# Patient Record
Sex: FEMALE | Race: OTHER | Employment: UNEMPLOYED | URBAN - METROPOLITAN AREA
[De-identification: names, ages, dates, MRNs, and addresses within clinical notes are randomized per-mention and may not be internally consistent; named-entity substitution may affect disease eponyms.]

---

## 2017-12-13 ENCOUNTER — HOSPITAL ENCOUNTER (EMERGENCY)
Age: 67
Discharge: HOME | End: 2017-12-13
Payer: MEDICAID

## 2017-12-13 ENCOUNTER — APPOINTMENT (OUTPATIENT)
Dept: GENERAL RADIOLOGY | Age: 67
End: 2017-12-13
Payer: MEDICAID

## 2017-12-13 VITALS
HEIGHT: 62 IN | TEMPERATURE: 97.1 F | SYSTOLIC BLOOD PRESSURE: 171 MMHG | RESPIRATION RATE: 18 BRPM | WEIGHT: 177.13 LBS | HEART RATE: 69 BPM | OXYGEN SATURATION: 97 % | DIASTOLIC BLOOD PRESSURE: 91 MMHG | BODY MASS INDEX: 32.59 KG/M2

## 2017-12-13 DIAGNOSIS — S20.211A RIB CONTUSION, RIGHT, INITIAL ENCOUNTER: Primary | ICD-10-CM

## 2017-12-13 DIAGNOSIS — S80.01XA CONTUSION OF RIGHT KNEE, INITIAL ENCOUNTER: ICD-10-CM

## 2017-12-13 PROCEDURE — 73564 X-RAY EXAM KNEE 4 OR MORE: CPT

## 2017-12-13 PROCEDURE — 99283 EMERGENCY DEPT VISIT LOW MDM: CPT

## 2017-12-13 PROCEDURE — 6370000000 HC RX 637 (ALT 250 FOR IP): Performed by: PHYSICIAN ASSISTANT

## 2017-12-13 PROCEDURE — 71101 X-RAY EXAM UNILAT RIBS/CHEST: CPT

## 2017-12-13 RX ORDER — HYDROCODONE BITARTRATE AND ACETAMINOPHEN 5; 325 MG/1; MG/1
1 TABLET ORAL ONCE
Status: COMPLETED | OUTPATIENT
Start: 2017-12-13 | End: 2017-12-13

## 2017-12-13 RX ORDER — ASPIRIN 81 MG/1
81 TABLET, CHEWABLE ORAL DAILY
COMMUNITY

## 2017-12-13 RX ORDER — HYDROCODONE BITARTRATE AND ACETAMINOPHEN 7.5; 325 MG/1; MG/1
1 TABLET ORAL EVERY 8 HOURS PRN
Qty: 12 TABLET | Refills: 0 | Status: SHIPPED | OUTPATIENT
Start: 2017-12-13

## 2017-12-13 RX ADMIN — HYDROCODONE BITARTRATE AND ACETAMINOPHEN 1 TABLET: 5; 325 TABLET ORAL at 19:15

## 2017-12-13 ASSESSMENT — ENCOUNTER SYMPTOMS
RESPIRATORY NEGATIVE: 1
GASTROINTESTINAL NEGATIVE: 1
EYES NEGATIVE: 1

## 2017-12-13 ASSESSMENT — PAIN SCALES - GENERAL
PAINLEVEL_OUTOF10: 9
PAINLEVEL_OUTOF10: 8

## 2017-12-13 ASSESSMENT — PAIN DESCRIPTION - ORIENTATION: ORIENTATION: RIGHT

## 2017-12-13 ASSESSMENT — PAIN DESCRIPTION - LOCATION: LOCATION: RIB CAGE;ABDOMEN;LEG

## 2017-12-13 NOTE — ED TRIAGE NOTES
A& ox4 female, skin pk/w/d with bruising right thigh & right rib cage area, ls clear, delacruz x4 = & spont, gait steady

## 2017-12-13 NOTE — ED PROVIDER NOTES
times daily. FLUTICASONE (FLONASE) 50 MCG/ACT NASAL SPRAY    2 sprays by Nasal route every 12 hours for 15 days    GLIMEPIRIDE (AMARYL) 2 MG TABLET    Take 2 mg by mouth every morning (before breakfast)     IBUPROFEN (ADVIL;MOTRIN) 800 MG TABLET    Take 800 mg by mouth every 8 hours as needed for Pain     ISOSORBIDE MONONITRATE (IMDUR) 30 MG CR TABLET    Take 15 mg by mouth daily     LISINOPRIL (PRINIVIL;ZESTRIL) 20 MG TABLET    Take 20 mg by mouth daily. LOVASTATIN (MEVACOR) 40 MG TABLET    Take 40 mg by mouth nightly. METFORMIN (GLUCOPHAGE) 500 MG TABLET    Take 500 mg by mouth 2 times daily (with meals). METOPROLOL (LOPRESSOR) 25 MG TABLET    Take 50 mg by mouth 2 times daily     OMEGA-3 FATTY ACIDS (FISH OIL) 1000 MG CAPS    Take 1,000 mg by mouth daily. OMEPRAZOLE (PRILOSEC) 20 MG CAPSULE    Take 20 mg by mouth Daily        ALLERGIES     Amoxicillin    FAMILY HISTORY       Family History   Problem Relation Age of Onset    Heart Disease Mother     Heart Disease Father     Heart Disease Sister     Heart Disease Brother           SOCIAL HISTORY       Social History     Social History    Marital status: Legally      Spouse name: N/A    Number of children: N/A    Years of education: N/A     Social History Main Topics    Smoking status: Never Smoker    Smokeless tobacco: Never Used    Alcohol use No    Drug use: No    Sexual activity: Not Asked     Other Topics Concern    None     Social History Narrative    None       SCREENINGS             PHYSICAL EXAM    (up to 7 for level 4, 8 or more for level 5)     ED Triage Vitals [12/13/17 1742]   BP Temp Temp Source Pulse Resp SpO2 Height Weight   (!) 171/91 97.1 °F (36.2 °C) Oral 69 18 97 % 5' 2\" (1.575 m) 177 lb 2 oz (80.3 kg)       Physical Exam   Constitutional: She is oriented to person, place, and time. She appears well-developed and well-nourished. No distress. HENT:   Head: Normocephalic and atraumatic.    Eyes: Stefano Dooley PA-C  12/13/17 2009

## 2017-12-14 NOTE — ED NOTES
A&o, skin warm, dry, respirations are unlabored, calm and cooperative, gait steady.      Ethan Stoddard RN  12/13/17 1919

## 2018-04-28 ENCOUNTER — APPOINTMENT (OUTPATIENT)
Dept: GENERAL RADIOLOGY | Age: 68
End: 2018-04-28
Payer: MEDICARE

## 2018-04-28 ENCOUNTER — APPOINTMENT (OUTPATIENT)
Dept: ULTRASOUND IMAGING | Age: 68
End: 2018-04-28
Payer: MEDICARE

## 2018-04-28 ENCOUNTER — HOSPITAL ENCOUNTER (EMERGENCY)
Age: 68
Discharge: HOME | End: 2018-04-28
Attending: STUDENT IN AN ORGANIZED HEALTH CARE EDUCATION/TRAINING PROGRAM
Payer: MEDICARE

## 2018-04-28 VITALS
SYSTOLIC BLOOD PRESSURE: 129 MMHG | HEART RATE: 71 BPM | WEIGHT: 181 LBS | HEIGHT: 62 IN | TEMPERATURE: 98 F | DIASTOLIC BLOOD PRESSURE: 92 MMHG | BODY MASS INDEX: 33.31 KG/M2 | OXYGEN SATURATION: 96 % | RESPIRATION RATE: 16 BRPM

## 2018-04-28 DIAGNOSIS — M79.651 ACUTE PAIN OF RIGHT THIGH: Primary | ICD-10-CM

## 2018-04-28 DIAGNOSIS — S86.911A MUSCLE STRAIN OF RIGHT LOWER EXTREMITY, INITIAL ENCOUNTER: ICD-10-CM

## 2018-04-28 DIAGNOSIS — E86.0 DEHYDRATION: ICD-10-CM

## 2018-04-28 LAB
ALBUMIN SERPL-MCNC: 4 G/DL (ref 3.9–4.9)
ALP BLD-CCNC: 59 U/L (ref 40–130)
ALT SERPL-CCNC: 14 U/L (ref 0–33)
ANION GAP SERPL CALCULATED.3IONS-SCNC: 11 MEQ/L (ref 7–13)
APTT: 24.2 SEC (ref 21.6–35.4)
AST SERPL-CCNC: 15 U/L (ref 0–35)
BASOPHILS ABSOLUTE: 0.1 K/UL (ref 0–0.2)
BASOPHILS RELATIVE PERCENT: 0.9 %
BILIRUB SERPL-MCNC: 0.3 MG/DL (ref 0–1.2)
BUN BLDV-MCNC: 18 MG/DL (ref 8–23)
CALCIUM SERPL-MCNC: 9.4 MG/DL (ref 8.6–10.2)
CHLORIDE BLD-SCNC: 102 MEQ/L (ref 98–107)
CO2: 27 MEQ/L (ref 22–29)
CREAT SERPL-MCNC: 0.43 MG/DL (ref 0.5–0.9)
EOSINOPHILS ABSOLUTE: 0.2 K/UL (ref 0–0.7)
EOSINOPHILS RELATIVE PERCENT: 2.6 %
GFR AFRICAN AMERICAN: >60
GFR NON-AFRICAN AMERICAN: >60
GLOBULIN: 2.5 G/DL (ref 2.3–3.5)
GLUCOSE BLD-MCNC: 104 MG/DL (ref 74–109)
HCT VFR BLD CALC: 40.5 % (ref 37–47)
HEMOGLOBIN: 13.7 G/DL (ref 12–16)
INR BLD: 1
LACTIC ACID: 2.3 MMOL/L (ref 0.5–2.2)
LYMPHOCYTES ABSOLUTE: 1.8 K/UL (ref 1–4.8)
LYMPHOCYTES RELATIVE PERCENT: 27.4 %
MCH RBC QN AUTO: 30.4 PG (ref 27–31.3)
MCHC RBC AUTO-ENTMCNC: 33.9 % (ref 33–37)
MCV RBC AUTO: 89.7 FL (ref 82–100)
MONOCYTES ABSOLUTE: 0.4 K/UL (ref 0.2–0.8)
MONOCYTES RELATIVE PERCENT: 6.5 %
NEUTROPHILS ABSOLUTE: 4.1 K/UL (ref 1.4–6.5)
NEUTROPHILS RELATIVE PERCENT: 62.6 %
PDW BLD-RTO: 14.6 % (ref 11.5–14.5)
PLATELET # BLD: 188 K/UL (ref 130–400)
POTASSIUM SERPL-SCNC: 4.9 MEQ/L (ref 3.5–5.1)
PROTHROMBIN TIME: 10.2 SEC (ref 9.6–12.3)
RBC # BLD: 4.51 M/UL (ref 4.2–5.4)
SODIUM BLD-SCNC: 140 MEQ/L (ref 132–144)
TOTAL CK: 72 U/L (ref 0–170)
TOTAL PROTEIN: 6.5 G/DL (ref 6.4–8.1)
URIC ACID, SERUM: 3.7 MG/DL (ref 2.4–5.7)
WBC # BLD: 6.6 K/UL (ref 4.8–10.8)

## 2018-04-28 PROCEDURE — 6360000002 HC RX W HCPCS: Performed by: STUDENT IN AN ORGANIZED HEALTH CARE EDUCATION/TRAINING PROGRAM

## 2018-04-28 PROCEDURE — 73552 X-RAY EXAM OF FEMUR 2/>: CPT

## 2018-04-28 PROCEDURE — 85730 THROMBOPLASTIN TIME PARTIAL: CPT

## 2018-04-28 PROCEDURE — 82550 ASSAY OF CK (CPK): CPT

## 2018-04-28 PROCEDURE — 84550 ASSAY OF BLOOD/URIC ACID: CPT

## 2018-04-28 PROCEDURE — 85025 COMPLETE CBC W/AUTO DIFF WBC: CPT

## 2018-04-28 PROCEDURE — 80053 COMPREHEN METABOLIC PANEL: CPT

## 2018-04-28 PROCEDURE — 93971 EXTREMITY STUDY: CPT

## 2018-04-28 PROCEDURE — 85610 PROTHROMBIN TIME: CPT

## 2018-04-28 PROCEDURE — 36415 COLL VENOUS BLD VENIPUNCTURE: CPT

## 2018-04-28 PROCEDURE — 99284 EMERGENCY DEPT VISIT MOD MDM: CPT

## 2018-04-28 PROCEDURE — 96374 THER/PROPH/DIAG INJ IV PUSH: CPT

## 2018-04-28 PROCEDURE — 83605 ASSAY OF LACTIC ACID: CPT

## 2018-04-28 RX ORDER — KETOROLAC TROMETHAMINE 30 MG/ML
30 INJECTION, SOLUTION INTRAMUSCULAR; INTRAVENOUS ONCE
Status: COMPLETED | OUTPATIENT
Start: 2018-04-28 | End: 2018-04-28

## 2018-04-28 RX ORDER — ASPIRIN 81 MG
1 TABLET,CHEWABLE ORAL 3 TIMES DAILY PRN
Qty: 56.6 G | Refills: 0 | Status: ON HOLD | OUTPATIENT
Start: 2018-04-28 | End: 2021-06-03

## 2018-04-28 RX ORDER — CYCLOBENZAPRINE HCL 10 MG
10 TABLET ORAL 3 TIMES DAILY PRN
Qty: 12 TABLET | Refills: 0 | Status: ON HOLD | OUTPATIENT
Start: 2018-04-28 | End: 2021-06-03

## 2018-04-28 RX ORDER — NAPROXEN 500 MG/1
500 TABLET ORAL 2 TIMES DAILY
Qty: 20 TABLET | Refills: 0 | Status: ON HOLD | OUTPATIENT
Start: 2018-04-28 | End: 2020-08-14 | Stop reason: HOSPADM

## 2018-04-28 RX ADMIN — KETOROLAC TROMETHAMINE 30 MG: 30 INJECTION, SOLUTION INTRAMUSCULAR at 13:34

## 2018-04-28 ASSESSMENT — ENCOUNTER SYMPTOMS
SINUS PRESSURE: 0
ABDOMINAL PAIN: 0
SHORTNESS OF BREATH: 0
TROUBLE SWALLOWING: 0
BACK PAIN: 0
VOMITING: 0
DIARRHEA: 0
COUGH: 0
CHEST TIGHTNESS: 0

## 2018-04-28 ASSESSMENT — PAIN DESCRIPTION - LOCATION: LOCATION: LEG

## 2018-04-28 ASSESSMENT — PAIN DESCRIPTION - FREQUENCY: FREQUENCY: INTERMITTENT

## 2018-04-28 ASSESSMENT — PAIN SCALES - GENERAL
PAINLEVEL_OUTOF10: 8
PAINLEVEL_OUTOF10: 8

## 2018-04-28 ASSESSMENT — PAIN DESCRIPTION - PAIN TYPE: TYPE: CHRONIC PAIN

## 2018-04-28 ASSESSMENT — PAIN DESCRIPTION - ORIENTATION: ORIENTATION: RIGHT;UPPER

## 2018-09-22 ENCOUNTER — HOSPITAL ENCOUNTER (EMERGENCY)
Age: 68
Discharge: HOME | End: 2018-09-22
Payer: MEDICARE

## 2018-09-22 ENCOUNTER — APPOINTMENT (OUTPATIENT)
Dept: GENERAL RADIOLOGY | Age: 68
End: 2018-09-22
Payer: MEDICARE

## 2018-09-22 VITALS
HEIGHT: 62 IN | HEART RATE: 70 BPM | OXYGEN SATURATION: 100 % | SYSTOLIC BLOOD PRESSURE: 142 MMHG | WEIGHT: 195 LBS | TEMPERATURE: 97.9 F | BODY MASS INDEX: 35.88 KG/M2 | RESPIRATION RATE: 20 BRPM | DIASTOLIC BLOOD PRESSURE: 68 MMHG

## 2018-09-22 DIAGNOSIS — J06.9 VIRAL URI WITH COUGH: Primary | ICD-10-CM

## 2018-09-22 LAB
RAPID INFLUENZA  B AGN: NEGATIVE
RAPID INFLUENZA A AGN: NEGATIVE

## 2018-09-22 PROCEDURE — 87804 INFLUENZA ASSAY W/OPTIC: CPT

## 2018-09-22 PROCEDURE — 71046 X-RAY EXAM CHEST 2 VIEWS: CPT

## 2018-09-22 PROCEDURE — 99283 EMERGENCY DEPT VISIT LOW MDM: CPT

## 2018-09-22 RX ORDER — ALBUTEROL SULFATE 90 UG/1
2 AEROSOL, METERED RESPIRATORY (INHALATION) EVERY 6 HOURS PRN
Qty: 1 INHALER | Refills: 0 | Status: ON HOLD | OUTPATIENT
Start: 2018-09-22 | End: 2021-06-03

## 2018-09-22 RX ORDER — BENZONATATE 100 MG/1
100 CAPSULE ORAL 3 TIMES DAILY PRN
Qty: 21 CAPSULE | Refills: 0 | Status: SHIPPED | OUTPATIENT
Start: 2018-09-22 | End: 2018-09-29

## 2018-09-22 RX ORDER — AZELASTINE 1 MG/ML
1 SPRAY, METERED NASAL 2 TIMES DAILY
Qty: 1 BOTTLE | Refills: 3 | Status: ON HOLD | OUTPATIENT
Start: 2018-09-22 | End: 2021-06-03

## 2018-09-22 RX ORDER — LORATADINE 10 MG/1
10 TABLET ORAL DAILY
Qty: 30 TABLET | Refills: 0 | Status: SHIPPED | OUTPATIENT
Start: 2018-09-22 | End: 2018-10-22

## 2018-09-22 ASSESSMENT — ENCOUNTER SYMPTOMS
SHORTNESS OF BREATH: 1
CHEST TIGHTNESS: 0
SORE THROAT: 1
SINUS PRESSURE: 1
DIARRHEA: 0
TROUBLE SWALLOWING: 0
ABDOMINAL DISTENTION: 0
RHINORRHEA: 1
BACK PAIN: 0
SINUS PAIN: 0
WHEEZING: 0
VOMITING: 0
NAUSEA: 0
COLOR CHANGE: 0
CONSTIPATION: 0
ABDOMINAL PAIN: 0
COUGH: 1

## 2018-09-22 ASSESSMENT — PAIN SCALES - GENERAL: PAINLEVEL_OUTOF10: 9

## 2018-09-22 ASSESSMENT — PAIN DESCRIPTION - DESCRIPTORS: DESCRIPTORS: ACHING

## 2018-09-22 ASSESSMENT — PAIN DESCRIPTION - FREQUENCY: FREQUENCY: INTERMITTENT

## 2018-09-22 ASSESSMENT — PAIN DESCRIPTION - PROGRESSION: CLINICAL_PROGRESSION: GRADUALLY WORSENING

## 2018-09-22 ASSESSMENT — PAIN DESCRIPTION - LOCATION: LOCATION: GENERALIZED

## 2018-09-22 ASSESSMENT — PAIN DESCRIPTION - ONSET: ONSET: ON-GOING

## 2018-09-22 ASSESSMENT — PAIN DESCRIPTION - PAIN TYPE: TYPE: ACUTE PAIN

## 2018-09-22 NOTE — ED PROVIDER NOTES
[facility] ED  eMERGENCY dEPARTMENT eNCOUnter      Pt Name: Minda Chin  MRN: [id number]  [place] 1950  Date of evaluation: 9/22/2018  Provider: Len Umanzor       Chief Complaint   Patient presents with    Cough     productive cough. unknown color due to patient swallowing mucous. HISTORY OF PRESENT ILLNESS   (Location/Symptom, Timing/Onset, Context/Setting, Quality, Duration, Modifying Factors, Severity)  Note limiting factors. Minda Chin is a 79 y.o. female who presents to the emergency department For complaint of cough congestion body aches fever sensation of chills and sweats ×2 days. Patient's daughter reports the patient's had a productive cough increased and worse the past 24 hours. Body aches started today that she really did not 10 generalized aching but no modifying factors. Patient reports sore throat itching and irritation and runny nose with cough and congestion. Denies any abdominal pain nausea vomiting or diarrhea. Daughter reports the patient has been taking over-the-counter Lacey-Wilson Tylenol and cold and cough medications. She reports only minor relief from this. Nursing Notes were reviewed. REVIEW OF SYSTEMS    (2-9 systems for level 4, 10 or more for level 5)     Review of Systems   Constitutional: Positive for chills, diaphoresis and fatigue. Negative for activity change, appetite change and fever. HENT: Positive for postnasal drip, rhinorrhea, sinus pressure and sore throat. Negative for congestion, ear pain, sinus pain and trouble swallowing. Respiratory: Positive for cough and shortness of breath (with coughing only). Negative for chest tightness and wheezing. Cardiovascular: Negative for chest pain and palpitations. Gastrointestinal: Negative for abdominal distention, abdominal pain, constipation, diarrhea, nausea and vomiting.    Genitourinary: Negative for difficulty urinating, patient is afebrile and nontoxic no acute distress. Respirations are even unlabored with equal bilateral clear lung sounds. Due to patient's presentation and rapid development of symptoms flu swab obtained for further evaluation. Influenza swab and chest x-ray are negative. Patient is stable to discharge home with medications for symptom management. Patient's daughter states she has bad reactions to steroids, patient's lung sounds are clear with no diminished lung sounds or wheezing at this time steroids do not appear to be assess area. Encouraged to follow up primary care providers and his possible further evaluation. Directed The daughter the patient to return to the ER immediately for any worsening of symptoms chest tightness chest pain severe shortness of breath or symptoms of other concern. Patient and family verbalized understanding of all given instructions and education. Standard anticipatory guidance given to patient upon discharge. Have given them a specific time frame in which to follow-up and who to follow-up with. I have also advised them that they should return to the emergency department if they get worse, or not getting better or develop any new or concerning symptoms. Patient demonstrates understanding and all questions were answered. CRITICAL CARE TIME       CONSULTS:  None    PROCEDURES:  Unless otherwise noted below, none     Procedures    FINAL IMPRESSION      1.  Viral URI with cough          DISPOSITION/PLAN   DISPOSITION Decision To Discharge 09/22/2018 07:06:54 PM      PATIENT REFERRED TO:  Sunitha Lopez MD  [address] [facility] [address]  [phone]    Call in 1 day        DISCHARGE MEDICATIONS:  New Prescriptions    ALBUTEROL SULFATE  (90 BASE) MCG/ACT INHALER    Inhale 2 puffs into the lungs every 6 hours as needed for Wheezing    AZELASTINE (ASTELIN) 0.1 % NASAL SPRAY    1 spray by Nasal route 2 times daily Use in each nostril as directed

## 2019-02-03 ENCOUNTER — HOSPITAL ENCOUNTER (EMERGENCY)
Age: 69
Discharge: HOME | End: 2019-02-03
Attending: EMERGENCY MEDICINE
Payer: MEDICARE

## 2019-02-03 ENCOUNTER — APPOINTMENT (OUTPATIENT)
Dept: ULTRASOUND IMAGING | Age: 69
End: 2019-02-03
Payer: MEDICARE

## 2019-02-03 ENCOUNTER — APPOINTMENT (OUTPATIENT)
Dept: GENERAL RADIOLOGY | Age: 69
End: 2019-02-03
Payer: MEDICARE

## 2019-02-03 VITALS
SYSTOLIC BLOOD PRESSURE: 166 MMHG | DIASTOLIC BLOOD PRESSURE: 90 MMHG | OXYGEN SATURATION: 98 % | TEMPERATURE: 98.1 F | HEART RATE: 74 BPM | RESPIRATION RATE: 18 BRPM

## 2019-02-03 DIAGNOSIS — R60.0 LOWER EXTREMITY EDEMA: Primary | ICD-10-CM

## 2019-02-03 LAB
ANION GAP SERPL CALCULATED.3IONS-SCNC: 15 MEQ/L (ref 7–13)
BASOPHILS ABSOLUTE: 0.1 K/UL (ref 0–0.2)
BASOPHILS RELATIVE PERCENT: 0.9 %
BUN BLDV-MCNC: 15 MG/DL (ref 8–23)
CALCIUM SERPL-MCNC: 9.4 MG/DL (ref 8.6–10.2)
CHLORIDE BLD-SCNC: 102 MEQ/L (ref 98–107)
CO2: 27 MEQ/L (ref 22–29)
CREAT SERPL-MCNC: 0.54 MG/DL (ref 0.5–0.9)
EKG ATRIAL RATE: 81 BPM
EKG P AXIS: 53 DEGREES
EKG P-R INTERVAL: 142 MS
EKG Q-T INTERVAL: 380 MS
EKG QRS DURATION: 88 MS
EKG QTC CALCULATION (BAZETT): 441 MS
EKG R AXIS: 8 DEGREES
EKG T AXIS: 11 DEGREES
EKG VENTRICULAR RATE: 81 BPM
EOSINOPHILS ABSOLUTE: 0.2 K/UL (ref 0–0.7)
EOSINOPHILS RELATIVE PERCENT: 4 %
GFR AFRICAN AMERICAN: >60
GFR NON-AFRICAN AMERICAN: >60
GLUCOSE BLD-MCNC: 91 MG/DL (ref 74–109)
HCT VFR BLD CALC: 41.8 % (ref 37–47)
HEMOGLOBIN: 13.7 G/DL (ref 12–16)
LYMPHOCYTES ABSOLUTE: 2 K/UL (ref 1–4.8)
LYMPHOCYTES RELATIVE PERCENT: 32.2 %
MCH RBC QN AUTO: 29.6 PG (ref 27–31.3)
MCHC RBC AUTO-ENTMCNC: 32.8 % (ref 33–37)
MCV RBC AUTO: 90.1 FL (ref 82–100)
MONOCYTES ABSOLUTE: 0.5 K/UL (ref 0.2–0.8)
MONOCYTES RELATIVE PERCENT: 7.4 %
NEUTROPHILS ABSOLUTE: 3.4 K/UL (ref 1.4–6.5)
NEUTROPHILS RELATIVE PERCENT: 55.5 %
PDW BLD-RTO: 14 % (ref 11.5–14.5)
PLATELET # BLD: 197 K/UL (ref 130–400)
POTASSIUM SERPL-SCNC: 4.1 MEQ/L (ref 3.5–5.1)
PRO-BNP: 210 PG/ML
RBC # BLD: 4.64 M/UL (ref 4.2–5.4)
SODIUM BLD-SCNC: 144 MEQ/L (ref 132–144)
WBC # BLD: 6.1 K/UL (ref 4.8–10.8)

## 2019-02-03 PROCEDURE — 6370000000 HC RX 637 (ALT 250 FOR IP): Performed by: EMERGENCY MEDICINE

## 2019-02-03 PROCEDURE — 6360000002 HC RX W HCPCS: Performed by: EMERGENCY MEDICINE

## 2019-02-03 PROCEDURE — 99284 EMERGENCY DEPT VISIT MOD MDM: CPT

## 2019-02-03 PROCEDURE — 96374 THER/PROPH/DIAG INJ IV PUSH: CPT

## 2019-02-03 PROCEDURE — 80048 BASIC METABOLIC PNL TOTAL CA: CPT

## 2019-02-03 PROCEDURE — 93005 ELECTROCARDIOGRAM TRACING: CPT

## 2019-02-03 PROCEDURE — 83880 ASSAY OF NATRIURETIC PEPTIDE: CPT

## 2019-02-03 PROCEDURE — 71045 X-RAY EXAM CHEST 1 VIEW: CPT

## 2019-02-03 PROCEDURE — 93970 EXTREMITY STUDY: CPT

## 2019-02-03 PROCEDURE — 85025 COMPLETE CBC W/AUTO DIFF WBC: CPT

## 2019-02-03 RX ORDER — ACETAMINOPHEN 500 MG
1000 TABLET ORAL ONCE
Status: COMPLETED | OUTPATIENT
Start: 2019-02-03 | End: 2019-02-03

## 2019-02-03 RX ORDER — SODIUM CHLORIDE 0.9 % (FLUSH) 0.9 %
3 SYRINGE (ML) INJECTION EVERY 8 HOURS
Status: DISCONTINUED | OUTPATIENT
Start: 2019-02-03 | End: 2019-02-03 | Stop reason: HOSPADM

## 2019-02-03 RX ORDER — FUROSEMIDE 20 MG/1
20 TABLET ORAL 2 TIMES DAILY
Qty: 4 TABLET | Refills: 0 | Status: ON HOLD | OUTPATIENT
Start: 2019-02-03 | End: 2021-06-03

## 2019-02-03 RX ORDER — FUROSEMIDE 10 MG/ML
20 INJECTION INTRAMUSCULAR; INTRAVENOUS ONCE
Status: COMPLETED | OUTPATIENT
Start: 2019-02-03 | End: 2019-02-03

## 2019-02-03 RX ADMIN — FUROSEMIDE 20 MG: 10 INJECTION, SOLUTION INTRAVENOUS at 20:05

## 2019-02-03 RX ADMIN — ACETAMINOPHEN 1000 MG: 500 TABLET ORAL at 20:04

## 2019-02-03 ASSESSMENT — PAIN SCALES - GENERAL: PAINLEVEL_OUTOF10: 10

## 2019-02-03 ASSESSMENT — PAIN DESCRIPTION - ONSET: ONSET: SUDDEN

## 2019-02-03 ASSESSMENT — PAIN DESCRIPTION - FREQUENCY: FREQUENCY: CONTINUOUS

## 2019-02-03 ASSESSMENT — PAIN DESCRIPTION - ORIENTATION: ORIENTATION: LOWER

## 2019-02-03 ASSESSMENT — PAIN DESCRIPTION - LOCATION: LOCATION: LEG

## 2019-02-03 ASSESSMENT — PAIN DESCRIPTION - DESCRIPTORS: DESCRIPTORS: PRESSURE

## 2019-02-03 ASSESSMENT — PAIN DESCRIPTION - PAIN TYPE: TYPE: ACUTE PAIN

## 2019-02-04 PROCEDURE — 93010 ELECTROCARDIOGRAM REPORT: CPT | Performed by: INTERNAL MEDICINE

## 2019-04-20 ENCOUNTER — HOSPITAL ENCOUNTER (EMERGENCY)
Age: 69
Discharge: HOME | End: 2019-04-20
Attending: EMERGENCY MEDICINE
Payer: MEDICARE

## 2019-04-20 VITALS
TEMPERATURE: 97.9 F | WEIGHT: 185 LBS | RESPIRATION RATE: 16 BRPM | BODY MASS INDEX: 34.04 KG/M2 | OXYGEN SATURATION: 99 % | DIASTOLIC BLOOD PRESSURE: 75 MMHG | HEIGHT: 62 IN | HEART RATE: 92 BPM | SYSTOLIC BLOOD PRESSURE: 153 MMHG

## 2019-04-20 DIAGNOSIS — I10 ESSENTIAL HYPERTENSION: ICD-10-CM

## 2019-04-20 DIAGNOSIS — H81.03 MENIERE'S DISEASE OF BOTH EARS: Primary | ICD-10-CM

## 2019-04-20 LAB
ALBUMIN SERPL-MCNC: 4.1 G/DL (ref 3.5–4.6)
ALP BLD-CCNC: 56 U/L (ref 40–130)
ALT SERPL-CCNC: 17 U/L (ref 0–33)
ANION GAP SERPL CALCULATED.3IONS-SCNC: 17 MEQ/L (ref 9–15)
AST SERPL-CCNC: 17 U/L (ref 0–35)
BASOPHILS ABSOLUTE: 0.1 K/UL (ref 0–0.2)
BASOPHILS RELATIVE PERCENT: 0.8 %
BILIRUB SERPL-MCNC: 0.3 MG/DL (ref 0.2–0.7)
BILIRUBIN URINE: NEGATIVE
BLOOD, URINE: NEGATIVE
BUN BLDV-MCNC: 11 MG/DL (ref 8–23)
CALCIUM SERPL-MCNC: 9 MG/DL (ref 8.5–9.9)
CHLORIDE BLD-SCNC: 98 MEQ/L (ref 95–107)
CLARITY: CLEAR
CO2: 23 MEQ/L (ref 20–31)
COLOR: YELLOW
CREAT SERPL-MCNC: 0.41 MG/DL (ref 0.5–0.9)
EKG ATRIAL RATE: 96 BPM
EKG P AXIS: 66 DEGREES
EKG P-R INTERVAL: 152 MS
EKG Q-T INTERVAL: 354 MS
EKG QRS DURATION: 84 MS
EKG QTC CALCULATION (BAZETT): 447 MS
EKG R AXIS: 20 DEGREES
EKG T AXIS: 18 DEGREES
EKG VENTRICULAR RATE: 96 BPM
EOSINOPHILS ABSOLUTE: 0.2 K/UL (ref 0–0.7)
EOSINOPHILS RELATIVE PERCENT: 2.3 %
GFR AFRICAN AMERICAN: >60
GFR NON-AFRICAN AMERICAN: >60
GLOBULIN: 2.7 G/DL (ref 2.3–3.5)
GLUCOSE BLD-MCNC: 155 MG/DL (ref 70–99)
GLUCOSE URINE: NEGATIVE MG/DL
HCT VFR BLD CALC: 39.2 % (ref 37–47)
HEMOGLOBIN: 13.2 G/DL (ref 12–16)
KETONES, URINE: NEGATIVE MG/DL
LEUKOCYTE ESTERASE, URINE: NEGATIVE
LYMPHOCYTES ABSOLUTE: 1.9 K/UL (ref 1–4.8)
LYMPHOCYTES RELATIVE PERCENT: 23.5 %
MCH RBC QN AUTO: 28.6 PG (ref 27–31.3)
MCHC RBC AUTO-ENTMCNC: 33.6 % (ref 33–37)
MCV RBC AUTO: 85.1 FL (ref 82–100)
MONOCYTES ABSOLUTE: 0.5 K/UL (ref 0.2–0.8)
MONOCYTES RELATIVE PERCENT: 5.7 %
NEUTROPHILS ABSOLUTE: 5.6 K/UL (ref 1.4–6.5)
NEUTROPHILS RELATIVE PERCENT: 67.7 %
NITRITE, URINE: NEGATIVE
PDW BLD-RTO: 13.9 % (ref 11.5–14.5)
PH UA: 7.5 (ref 5–9)
PLATELET # BLD: 163 K/UL (ref 130–400)
POTASSIUM SERPL-SCNC: 3.6 MEQ/L (ref 3.4–4.9)
PROTEIN UA: NEGATIVE MG/DL
RBC # BLD: 4.61 M/UL (ref 4.2–5.4)
SODIUM BLD-SCNC: 138 MEQ/L (ref 135–144)
SPECIFIC GRAVITY UA: 1.01 (ref 1–1.03)
TOTAL PROTEIN: 6.8 G/DL (ref 6.3–8)
URINE REFLEX TO CULTURE: NORMAL
UROBILINOGEN, URINE: 0.2 E.U./DL
WBC # BLD: 8.2 K/UL (ref 4.8–10.8)

## 2019-04-20 PROCEDURE — 6370000000 HC RX 637 (ALT 250 FOR IP): Performed by: EMERGENCY MEDICINE

## 2019-04-20 PROCEDURE — 80053 COMPREHEN METABOLIC PANEL: CPT

## 2019-04-20 PROCEDURE — 99284 EMERGENCY DEPT VISIT MOD MDM: CPT

## 2019-04-20 PROCEDURE — 81003 URINALYSIS AUTO W/O SCOPE: CPT

## 2019-04-20 PROCEDURE — 85025 COMPLETE CBC W/AUTO DIFF WBC: CPT

## 2019-04-20 PROCEDURE — 93005 ELECTROCARDIOGRAM TRACING: CPT

## 2019-04-20 PROCEDURE — 36415 COLL VENOUS BLD VENIPUNCTURE: CPT

## 2019-04-20 RX ORDER — CLONIDINE HYDROCHLORIDE 0.1 MG/1
0.2 TABLET ORAL ONCE
Status: COMPLETED | OUTPATIENT
Start: 2019-04-20 | End: 2019-04-20

## 2019-04-20 RX ORDER — MECLIZINE HYDROCHLORIDE 25 MG/1
25 TABLET ORAL ONCE
Status: COMPLETED | OUTPATIENT
Start: 2019-04-20 | End: 2019-04-20

## 2019-04-20 RX ORDER — CLONIDINE HYDROCHLORIDE 0.2 MG/1
0.2 TABLET ORAL 2 TIMES DAILY
Qty: 60 TABLET | Refills: 0 | Status: ON HOLD | OUTPATIENT
Start: 2019-04-20 | End: 2021-06-03

## 2019-04-20 RX ORDER — MECLIZINE HYDROCHLORIDE 25 MG/1
TABLET ORAL
Qty: 20 TABLET | Refills: 0 | Status: SHIPPED | OUTPATIENT
Start: 2019-04-20

## 2019-04-20 RX ADMIN — CLONIDINE HYDROCHLORIDE 0.2 MG: 0.1 TABLET ORAL at 01:26

## 2019-04-20 RX ADMIN — MECLIZINE HYDROCHLORIDE 25 MG: 25 TABLET ORAL at 01:26

## 2019-04-20 ASSESSMENT — ENCOUNTER SYMPTOMS
CONSTIPATION: 0
BLOOD IN STOOL: 0
COLOR CHANGE: 0
SINUS PRESSURE: 0
PHOTOPHOBIA: 0
NAUSEA: 0
CHEST TIGHTNESS: 0
ANAL BLEEDING: 0
VOMITING: 0
EYE ITCHING: 0
BACK PAIN: 0
FACIAL SWELLING: 0
EYE REDNESS: 0
VOICE CHANGE: 0
EYE PAIN: 0
CHOKING: 0
DIARRHEA: 0
SHORTNESS OF BREATH: 0
ABDOMINAL DISTENTION: 0
STRIDOR: 0
EYE DISCHARGE: 0
WHEEZING: 0
TROUBLE SWALLOWING: 0
COUGH: 0
RHINORRHEA: 0
ABDOMINAL PAIN: 0
SORE THROAT: 0

## 2019-04-20 NOTE — ED NOTES
Bed: 10  Expected date:   Expected time:   Means of arrival:   Comments:  69yr female anxiety and htn, 214/90, [facility]  04/20/19 9266

## 2019-04-20 NOTE — ED TRIAGE NOTES
Pt states she woke up to use the bathroom and became dizzy. Pt is visibly anxious and shaking. Pt admits that she does feel anxious and did not take her anxiety meds today.

## 2019-04-20 NOTE — ED PROVIDER NOTES
[address] ED  eMERGENCY dEPARTMENT eNCOUnter      Pt Name: Bhavin Griffith  MRN: [id number]  [place] 1950  Date of evaluation: 4/20/2019  Provider: Néstor Mayfield MD    CHIEF COMPLAINT       Chief Complaint   Patient presents with    Anxiety    Dizziness         HISTORY OF PRESENT ILLNESS   (Location/Symptom, Timing/Onset, Context/Setting, Quality, Duration, Modifying Factors, Severity)  Note limiting factors. Bhavin Griffith is a 76 y.o. female who presents to the emergency department with anxiety and dizziness. The patient states she awoke feeling anxious and very dizzy. She states this occurred approximately an hour ago. She denies any recent trauma. Location symptoms are in ahead timing in onset as above context and setting: This started at home. Quality pain: None. Duration: One and a half hours. Modifying factors: Patient's had no prior treatment for this. Very mild to moderate. Patient denies nausea vomiting diarrhea fever chills or headache. She states she is dizzy however. HPI    Nursing Notes were reviewed. REVIEW OF SYSTEMS    (2-9 systems for level 4, 10 or more for level 5)     Review of Systems   Constitutional: Negative for appetite change, chills, diaphoresis, fatigue and fever. HENT: Negative for congestion, dental problem, ear discharge, ear pain, facial swelling, hearing loss, mouth sores, nosebleeds, postnasal drip, rhinorrhea, sinus pressure, sneezing, sore throat, tinnitus, trouble swallowing and voice change. Eyes: Negative for photophobia, pain, discharge, redness, itching and visual disturbance. Respiratory: Negative for cough, choking, chest tightness, shortness of breath, wheezing and stridor. Cardiovascular: Negative for chest pain, palpitations and leg swelling. Gastrointestinal: Negative for abdominal distention, abdominal pain, anal bleeding, blood in stool, constipation, diarrhea, nausea and vomiting.    Endocrine: Negative for cold intolerance, heat intolerance, polydipsia and polyphagia. Genitourinary: Negative for decreased urine volume, difficulty urinating, dysuria, enuresis, flank pain, frequency, genital sores, hematuria and urgency. Musculoskeletal: Negative for arthralgias, back pain, gait problem, joint swelling, myalgias, neck pain and neck stiffness. Skin: Negative for color change, pallor, rash and wound. Allergic/Immunologic: Negative for environmental allergies and food allergies. Neurological: Positive for dizziness. Negative for tremors, seizures, syncope, facial asymmetry, speech difficulty, weakness, light-headedness, numbness and headaches. Hematological: Negative for adenopathy. Does not bruise/bleed easily. Psychiatric/Behavioral: Negative for agitation, behavioral problems, confusion, decreased concentration, dysphoric mood, hallucinations, self-injury, sleep disturbance and suicidal ideas. The patient is not nervous/anxious and is not hyperactive. Except as noted above the remainder of the review of systems was reviewed and negative. PAST MEDICAL HISTORY     Past Medical History:   Diagnosis Date    CAD (coronary artery disease)     Diabetes mellitus ([facility] [address]     Dyslipidemia     FH: CAD (coronary artery disease)     History of TIA (transient ischemic attack)     HTN (hypertension)     Unstable angina ([facility] [address] 12/13/12         SURGICAL HISTORY       Past Surgical History:   Procedure Laterality Date    CORONARY ANGIOPLASTY  12/14/12    DIAGNOSTIC CARDIAC CATH LAB PROCEDURE  12/14/12    HYSTERECTOMY  03/2018         CURRENT MEDICATIONS       Previous Medications    ALBUTEROL SULFATE  (90 BASE) MCG/ACT INHALER    Inhale 2 puffs into the lungs every 6 hours as needed for Wheezing    AMLODIPINE (NORVASC) 10 MG TABLET    Take 10 mg by mouth daily. ASPIRIN 325 MG EC TABLET    Take 325 mg by mouth daily.       ASPIRIN 81 MG CHEWABLE TABLET    Take 81 mg by mouth daily AZELASTINE (ASTELIN) 0.1 % NASAL SPRAY    1 spray by Nasal route 2 times daily Use in each nostril as directed    CAPSAICIN 0.1 % CREA    Apply 1 applicator topically 3 times daily as needed (right thigh pain)    CYCLOBENZAPRINE (FLEXERIL) 10 MG TABLET    Take 1 tablet by mouth 3 times daily as needed for Muscle spasms    DILTIAZEM (CARDIZEM SR) 90 MG SR CAPSULE    Take 90 mg by mouth 2 times daily. FLUTICASONE (FLONASE) 50 MCG/ACT NASAL SPRAY    2 sprays by Nasal route every 12 hours for 15 days    FUROSEMIDE (LASIX) 20 MG TABLET    Take 1 tablet by mouth 2 times daily    GLIMEPIRIDE (AMARYL) 2 MG TABLET    Take 2 mg by mouth every morning (before breakfast)     HYDROCODONE-ACETAMINOPHEN (NORCO) 7.5-325 MG PER TABLET    Take 1 tablet by mouth every 8 hours as needed for Pain . IBUPROFEN (ADVIL;MOTRIN) 800 MG TABLET    Take 800 mg by mouth every 8 hours as needed for Pain     ISOSORBIDE MONONITRATE (IMDUR) 30 MG CR TABLET    Take 15 mg by mouth daily     LISINOPRIL (PRINIVIL;ZESTRIL) 20 MG TABLET    Take 20 mg by mouth daily. LOVASTATIN (MEVACOR) 40 MG TABLET    Take 40 mg by mouth nightly. METFORMIN (GLUCOPHAGE) 500 MG TABLET    Take 500 mg by mouth 2 times daily (with meals). METOPROLOL (LOPRESSOR) 25 MG TABLET    Take 50 mg by mouth 2 times daily     NAPROXEN (NAPROSYN) 500 MG TABLET    Take 1 tablet by mouth 2 times daily for 20 doses    OMEGA-3 FATTY ACIDS (FISH OIL) 1000 MG CAPS    Take 1,000 mg by mouth daily. OMEPRAZOLE (PRILOSEC) 20 MG CAPSULE    Take 20 mg by mouth Daily        ALLERGIES     Amoxicillin    FAMILY HISTORY       Family History   Problem Relation Age of Onset    Heart Disease Mother     Heart Disease Father     Heart Disease Sister     Heart Disease Brother           SOCIAL HISTORY       Social History     Socioeconomic History    Marital status:       Spouse name: Not on file    Number of children: Not on file    Years of education: Not on file    Highest education level: Not on file   Occupational History    Not on file   Social Needs    Financial resource strain: Not on file    Food insecurity:     Worry: Not on file     Inability: Not on file    Transportation needs:     Medical: Not on file     Non-medical: Not on file   Tobacco Use    Smoking status: Never Smoker    Smokeless tobacco: Never Used   Substance and Sexual Activity    Alcohol use: No     Alcohol/week: 0.0 oz    Drug use: No    Sexual activity: Not on file   Lifestyle    Physical activity:     Days per week: Not on file     Minutes per session: Not on file    Stress: Not on file   Relationships    Social connections:     Talks on phone: Not on file     Gets together: Not on file     Attends [employer] service: Not on file     Active member of club or organization: Not on file     Attends meetings of clubs or organizations: Not on file     Relationship status: Not on file    Intimate partner violence:     Fear of current or ex partner: Not on file     Emotionally abused: Not on file     Physically abused: Not on file     Forced sexual activity: Not on file   Other Topics Concern    Not on file   Social History Narrative    Not on file       SCREENINGS             PHYSICAL EXAM    (up to 7 for level 4, 8 or more for level 5)     ED Triage Vitals [04/20/19 0046]   BP Temp Temp Source Pulse Resp SpO2 Height Weight   (!) 169/104 97.9 °F (36.6 °C) Oral 98 16 100 % 5' 2\" (1.575 m) 185 lb (83.9 kg)       Physical Exam   Constitutional: She is oriented to person, place, and time. She appears well-developed and well-nourished. No distress. HENT:   Head: Normocephalic and atraumatic. Nose: Nose normal.   Mouth/Throat: Oropharynx is clear and moist. No oropharyngeal exudate. Both TMs are somewhat pink and bulging. Indicative of most likely a viral labyrinthitis. Eyes: Pupils are equal, round, and reactive to light. Conjunctivae and EOM are normal. Right eye exhibits no discharge. Left eye exhibits no discharge. No scleral icterus. Neck: Normal range of motion. Neck supple. No JVD present. No tracheal deviation present. No thyromegaly present. Cardiovascular: Normal rate, regular rhythm, normal heart sounds and intact distal pulses. Exam reveals no gallop and no friction rub. No murmur heard. Pulmonary/Chest: Effort normal and breath sounds normal. No stridor. No respiratory distress. She has no wheezes. She has no rales. She exhibits no tenderness. Abdominal: Soft. Bowel sounds are normal. She exhibits no distension and no mass. There is no tenderness. There is no rebound and no guarding. Musculoskeletal: Normal range of motion. She exhibits no edema or tenderness. Lymphadenopathy:     She has no cervical adenopathy. Neurological: She is alert and oriented to person, place, and time. She has normal reflexes. She displays normal reflexes. No cranial nerve deficit. She exhibits normal muscle tone. Coordination normal.   Skin: Skin is warm and dry. No rash noted. She is not diaphoretic. No erythema. No pallor. Psychiatric: She has a normal mood and affect. Her behavior is normal. Judgment and thought content normal.   Nursing note and vitals reviewed. DIAGNOSTIC RESULTS     EKG: All EKG's are interpreted by the Emergency Department Physician who either signs or Co-signs this chart in the absence of a cardiologist.    12-lead EKG was performed which shows normal sinus rhythm with a rate of 96 bpm.  There is no ST segment elevation or depression in any limb lead or precordial lead. There is no ectopy. Summary normal EKG. RADIOLOGY:   Non-plain film images such as CT, Ultrasound and MRI are read by the radiologist. Plain radiographic images are visualized and preliminarily interpreted by the emergency physician with the below findings:    No diagnostic imaging was indicated or ordered.     Interpretation per the Radiologist below, if available at the time of this note:    No orders to display         ED BEDSIDE ULTRASOUND:   Performed by ED Physician - none    LABS:  Labs Reviewed   COMPREHENSIVE METABOLIC PANEL - Abnormal; Notable for the following components:       Result Value    Anion Gap 17 (*)     Glucose 155 (*)     CREATININE 0.41 (*)     All other components within normal limits   CBC WITH AUTO DIFFERENTIAL   URINE RT REFLEX TO CULTURE       All other labs were within normal range or not returned as of this dictation. EMERGENCY DEPARTMENT COURSE and DIFFERENTIAL DIAGNOSIS/MDM:   Vitals:    Vitals:    04/20/19 0046 04/20/19 0100 04/20/19 0200   BP: (!) 169/104 (!) 167/92 (!) 153/75   Pulse: 98 96 92   Resp: 16     Temp: 97.9 °F (36.6 °C)     TempSrc: Oral     SpO2: 100% 100% 99%   Weight: 185 lb (83.9 kg)     Height: 5' 2\" (1.575 m)         A pretty copies of the patient's labs and hand to her and explained urinalysis CBC and that pallor all normal.  I rechecked the patient she is no longer vertiginous states she feels better and when she came in. MDM      CRITICAL CARE TIME     CONSULTS:  None    PROCEDURES:  Unless otherwise noted below, none     Procedures    FINAL IMPRESSION      1. Meniere's disease of both ears    2. Essential hypertension          DISPOSITION/PLAN   DISPOSITION Decision To Discharge 04/20/2019 02:04:34 AM      PATIENT REFERRED TO:  Allison Rader MD  [address] [facility]  [phone]    Go in 3 days  For follow up. Return if worse in any way. DISCHARGE MEDICATIONS:  New Prescriptions    CLONIDINE (CATAPRES) 0.2 MG TABLET    Take 1 tablet by mouth 2 times daily Do not stop this medication suddenly.   Taper this medication off gradually if you want to discontinue    MECLIZINE (ANTIVERT) 25 MG TABLET    And every 6-8 hours as needed for dizziness          (Please note that portions of this note were completed with a voice recognition program.  Efforts were made to edit the dictations but occasionally words are mis-transcribed.)    Anna Haji MD (electronically signed)  Attending Emergency Physician          Anna Haji MD  04/20/19 4811

## 2019-04-23 PROCEDURE — 93010 ELECTROCARDIOGRAM REPORT: CPT | Performed by: INTERNAL MEDICINE

## 2020-08-13 ENCOUNTER — HOSPITAL ENCOUNTER (INPATIENT)
Age: 70
LOS: 1 days | Discharge: HOME | DRG: 247 | End: 2020-08-14
Attending: EMERGENCY MEDICINE | Admitting: INTERNAL MEDICINE
Payer: MEDICARE

## 2020-08-13 ENCOUNTER — APPOINTMENT (OUTPATIENT)
Dept: GENERAL RADIOLOGY | Age: 70
DRG: 247 | End: 2020-08-13
Payer: MEDICARE

## 2020-08-13 PROBLEM — R07.9 CHEST PAIN: Status: ACTIVE | Noted: 2020-08-13

## 2020-08-13 LAB
ALBUMIN SERPL-MCNC: 4 G/DL (ref 3.5–4.6)
ALP BLD-CCNC: 52 U/L (ref 40–130)
ALT SERPL-CCNC: 15 U/L (ref 0–33)
ANION GAP SERPL CALCULATED.3IONS-SCNC: 15 MEQ/L (ref 9–15)
APTT: 25.5 SEC (ref 24.4–36.8)
AST SERPL-CCNC: 15 U/L (ref 0–35)
BACTERIA: NEGATIVE /HPF
BASOPHILS ABSOLUTE: 0.1 K/UL (ref 0–0.2)
BASOPHILS RELATIVE PERCENT: 1 %
BILIRUB SERPL-MCNC: <0.2 MG/DL (ref 0.2–0.7)
BILIRUBIN URINE: NEGATIVE
BLOOD, URINE: NEGATIVE
BUN BLDV-MCNC: 11 MG/DL (ref 8–23)
CALCIUM SERPL-MCNC: 9.4 MG/DL (ref 8.5–9.9)
CHLORIDE BLD-SCNC: 104 MEQ/L (ref 95–107)
CLARITY: CLEAR
CO2: 25 MEQ/L (ref 20–31)
COLOR: YELLOW
CREAT SERPL-MCNC: 0.41 MG/DL (ref 0.5–0.9)
EKG ATRIAL RATE: 75 BPM
EKG P AXIS: 42 DEGREES
EKG P-R INTERVAL: 126 MS
EKG Q-T INTERVAL: 386 MS
EKG QRS DURATION: 94 MS
EKG QTC CALCULATION (BAZETT): 431 MS
EKG R AXIS: 26 DEGREES
EKG T AXIS: -1 DEGREES
EKG VENTRICULAR RATE: 75 BPM
EOSINOPHILS ABSOLUTE: 0.2 K/UL (ref 0–0.7)
EOSINOPHILS RELATIVE PERCENT: 2.6 %
EPITHELIAL CELLS, UA: NORMAL /HPF (ref 0–5)
GFR AFRICAN AMERICAN: >60
GFR NON-AFRICAN AMERICAN: >60
GLOBULIN: 2.7 G/DL (ref 2.3–3.5)
GLUCOSE BLD-MCNC: 118 MG/DL (ref 60–115)
GLUCOSE BLD-MCNC: 132 MG/DL (ref 60–115)
GLUCOSE BLD-MCNC: 136 MG/DL (ref 70–99)
GLUCOSE BLD-MCNC: 161 MG/DL (ref 60–115)
GLUCOSE URINE: NEGATIVE MG/DL
HBA1C MFR BLD: 6.5 % (ref 4.8–5.9)
HCT VFR BLD CALC: 38.5 % (ref 37–47)
HEMOGLOBIN: 12.5 G/DL (ref 12–16)
HYALINE CASTS: NORMAL /HPF (ref 0–5)
INR BLD: 1
KETONES, URINE: NEGATIVE MG/DL
LEUKOCYTE ESTERASE, URINE: ABNORMAL
LYMPHOCYTES ABSOLUTE: 1.9 K/UL (ref 1–4.8)
LYMPHOCYTES RELATIVE PERCENT: 31.9 %
MCH RBC QN AUTO: 27.6 PG (ref 27–31.3)
MCHC RBC AUTO-ENTMCNC: 32.4 % (ref 33–37)
MCV RBC AUTO: 85.2 FL (ref 82–100)
MONOCYTES ABSOLUTE: 0.5 K/UL (ref 0.2–0.8)
MONOCYTES RELATIVE PERCENT: 8.2 %
NEUTROPHILS ABSOLUTE: 3.4 K/UL (ref 1.4–6.5)
NEUTROPHILS RELATIVE PERCENT: 56.3 %
NITRITE, URINE: NEGATIVE
PDW BLD-RTO: 14.9 % (ref 11.5–14.5)
PERFORMED ON: ABNORMAL
PH UA: 7 (ref 5–9)
PLATELET # BLD: 206 K/UL (ref 130–400)
POTASSIUM REFLEX MAGNESIUM: 3.6 MEQ/L (ref 3.4–4.9)
PRO-BNP: 296 PG/ML
PROTEIN UA: NEGATIVE MG/DL
PROTHROMBIN TIME: 12.9 SEC (ref 12.3–14.9)
RBC # BLD: 4.52 M/UL (ref 4.2–5.4)
RBC UA: NORMAL /HPF (ref 0–5)
SODIUM BLD-SCNC: 144 MEQ/L (ref 135–144)
SPECIFIC GRAVITY UA: 1.01 (ref 1–1.03)
TOTAL PROTEIN: 6.7 G/DL (ref 6.3–8)
TROPONIN: <0.01 NG/ML (ref 0–0.01)
UROBILINOGEN, URINE: 0.2 E.U./DL
WBC # BLD: 6.1 K/UL (ref 4.8–10.8)
WBC UA: NORMAL /HPF (ref 0–5)

## 2020-08-13 PROCEDURE — 80053 COMPREHEN METABOLIC PANEL: CPT

## 2020-08-13 PROCEDURE — 6370000000 HC RX 637 (ALT 250 FOR IP): Performed by: PHYSICIAN ASSISTANT

## 2020-08-13 PROCEDURE — 2580000003 HC RX 258: Performed by: INTERNAL MEDICINE

## 2020-08-13 PROCEDURE — 85610 PROTHROMBIN TIME: CPT

## 2020-08-13 PROCEDURE — 96374 THER/PROPH/DIAG INJ IV PUSH: CPT

## 2020-08-13 PROCEDURE — 84484 ASSAY OF TROPONIN QUANT: CPT

## 2020-08-13 PROCEDURE — 93010 ELECTROCARDIOGRAM REPORT: CPT | Performed by: INTERNAL MEDICINE

## 2020-08-13 PROCEDURE — 99223 1ST HOSP IP/OBS HIGH 75: CPT | Performed by: INTERNAL MEDICINE

## 2020-08-13 PROCEDURE — 6370000000 HC RX 637 (ALT 250 FOR IP): Performed by: EMERGENCY MEDICINE

## 2020-08-13 PROCEDURE — 2500000003 HC RX 250 WO HCPCS: Performed by: EMERGENCY MEDICINE

## 2020-08-13 PROCEDURE — 93005 ELECTROCARDIOGRAM TRACING: CPT | Performed by: EMERGENCY MEDICINE

## 2020-08-13 PROCEDURE — 71045 X-RAY EXAM CHEST 1 VIEW: CPT

## 2020-08-13 PROCEDURE — 6360000002 HC RX W HCPCS: Performed by: INTERNAL MEDICINE

## 2020-08-13 PROCEDURE — APPNB45 APP NON BILLABLE 31-45 MINUTES: Performed by: PHYSICIAN ASSISTANT

## 2020-08-13 PROCEDURE — 36415 COLL VENOUS BLD VENIPUNCTURE: CPT

## 2020-08-13 PROCEDURE — 99285 EMERGENCY DEPT VISIT HI MDM: CPT

## 2020-08-13 PROCEDURE — 83036 HEMOGLOBIN GLYCOSYLATED A1C: CPT

## 2020-08-13 PROCEDURE — 81001 URINALYSIS AUTO W/SCOPE: CPT

## 2020-08-13 PROCEDURE — 2060000000 HC ICU INTERMEDIATE R&B

## 2020-08-13 PROCEDURE — 85025 COMPLETE CBC W/AUTO DIFF WBC: CPT

## 2020-08-13 PROCEDURE — 83880 ASSAY OF NATRIURETIC PEPTIDE: CPT

## 2020-08-13 PROCEDURE — 85730 THROMBOPLASTIN TIME PARTIAL: CPT

## 2020-08-13 RX ORDER — ISOSORBIDE MONONITRATE 30 MG/1
30 TABLET, EXTENDED RELEASE ORAL DAILY
Status: DISCONTINUED | OUTPATIENT
Start: 2020-08-14 | End: 2020-08-14 | Stop reason: HOSPADM

## 2020-08-13 RX ORDER — ACETAMINOPHEN 325 MG/1
650 TABLET ORAL EVERY 6 HOURS PRN
Status: DISCONTINUED | OUTPATIENT
Start: 2020-08-13 | End: 2020-08-14 | Stop reason: HOSPADM

## 2020-08-13 RX ORDER — LISINOPRIL 20 MG/1
20 TABLET ORAL DAILY
Status: DISCONTINUED | OUTPATIENT
Start: 2020-08-13 | End: 2020-08-13

## 2020-08-13 RX ORDER — DEXTROSE MONOHYDRATE 25 G/50ML
12.5 INJECTION, SOLUTION INTRAVENOUS PRN
Status: DISCONTINUED | OUTPATIENT
Start: 2020-08-13 | End: 2020-08-14 | Stop reason: HOSPADM

## 2020-08-13 RX ORDER — GLIMEPIRIDE 4 MG/1
2 TABLET ORAL
Status: DISCONTINUED | OUTPATIENT
Start: 2020-08-14 | End: 2020-08-14 | Stop reason: HOSPADM

## 2020-08-13 RX ORDER — ASPIRIN 81 MG/1
81 TABLET, CHEWABLE ORAL DAILY
Status: DISCONTINUED | OUTPATIENT
Start: 2020-08-13 | End: 2020-08-13

## 2020-08-13 RX ORDER — DILTIAZEM HYDROCHLORIDE 5 MG/ML
10 INJECTION INTRAVENOUS ONCE
Status: COMPLETED | OUTPATIENT
Start: 2020-08-13 | End: 2020-08-13

## 2020-08-13 RX ORDER — CLONIDINE HYDROCHLORIDE 0.1 MG/1
0.2 TABLET ORAL 2 TIMES DAILY
Status: DISCONTINUED | OUTPATIENT
Start: 2020-08-13 | End: 2020-08-14 | Stop reason: HOSPADM

## 2020-08-13 RX ORDER — FUROSEMIDE 20 MG/1
20 TABLET ORAL 2 TIMES DAILY
Status: DISCONTINUED | OUTPATIENT
Start: 2020-08-13 | End: 2020-08-14 | Stop reason: HOSPADM

## 2020-08-13 RX ORDER — AMLODIPINE BESYLATE 10 MG/1
10 TABLET ORAL DAILY
Status: DISCONTINUED | OUTPATIENT
Start: 2020-08-13 | End: 2020-08-14 | Stop reason: HOSPADM

## 2020-08-13 RX ORDER — ISOSORBIDE MONONITRATE 30 MG/1
15 TABLET, EXTENDED RELEASE ORAL DAILY
Status: DISCONTINUED | OUTPATIENT
Start: 2020-08-13 | End: 2020-08-13

## 2020-08-13 RX ORDER — CIPROFLOXACIN 500 MG/1
500 TABLET, FILM COATED ORAL ONCE
Status: COMPLETED | OUTPATIENT
Start: 2020-08-13 | End: 2020-08-13

## 2020-08-13 RX ORDER — ACETAMINOPHEN 650 MG/1
650 SUPPOSITORY RECTAL EVERY 6 HOURS PRN
Status: DISCONTINUED | OUTPATIENT
Start: 2020-08-13 | End: 2020-08-14 | Stop reason: HOSPADM

## 2020-08-13 RX ORDER — DEXTROSE MONOHYDRATE 50 MG/ML
100 INJECTION, SOLUTION INTRAVENOUS PRN
Status: DISCONTINUED | OUTPATIENT
Start: 2020-08-13 | End: 2020-08-14 | Stop reason: HOSPADM

## 2020-08-13 RX ORDER — ATORVASTATIN CALCIUM 10 MG/1
10 TABLET, FILM COATED ORAL DAILY
Status: DISCONTINUED | OUTPATIENT
Start: 2020-08-13 | End: 2020-08-14 | Stop reason: HOSPADM

## 2020-08-13 RX ORDER — CHLORAL HYDRATE 500 MG
1000 CAPSULE ORAL DAILY
Status: DISCONTINUED | OUTPATIENT
Start: 2020-08-13 | End: 2020-08-14 | Stop reason: HOSPADM

## 2020-08-13 RX ORDER — METOPROLOL TARTRATE 50 MG/1
50 TABLET, FILM COATED ORAL 2 TIMES DAILY
Status: DISCONTINUED | OUTPATIENT
Start: 2020-08-13 | End: 2020-08-14 | Stop reason: HOSPADM

## 2020-08-13 RX ORDER — SODIUM CHLORIDE 0.9 % (FLUSH) 0.9 %
10 SYRINGE (ML) INJECTION EVERY 12 HOURS SCHEDULED
Status: DISCONTINUED | OUTPATIENT
Start: 2020-08-13 | End: 2020-08-14 | Stop reason: HOSPADM

## 2020-08-13 RX ORDER — PANTOPRAZOLE SODIUM 40 MG/1
40 TABLET, DELAYED RELEASE ORAL
Status: DISCONTINUED | OUTPATIENT
Start: 2020-08-14 | End: 2020-08-14 | Stop reason: HOSPADM

## 2020-08-13 RX ORDER — DILTIAZEM HYDROCHLORIDE 90 MG/1
90 CAPSULE, EXTENDED RELEASE ORAL 2 TIMES DAILY
Status: DISCONTINUED | OUTPATIENT
Start: 2020-08-13 | End: 2020-08-13

## 2020-08-13 RX ORDER — ASPIRIN 81 MG/1
324 TABLET, CHEWABLE ORAL ONCE
Status: COMPLETED | OUTPATIENT
Start: 2020-08-13 | End: 2020-08-13

## 2020-08-13 RX ORDER — NITROGLYCERIN 0.4 MG/1
0.4 TABLET SUBLINGUAL EVERY 5 MIN PRN
Status: DISCONTINUED | OUTPATIENT
Start: 2020-08-13 | End: 2020-08-14 | Stop reason: HOSPADM

## 2020-08-13 RX ORDER — NICOTINE POLACRILEX 4 MG
15 LOZENGE BUCCAL PRN
Status: DISCONTINUED | OUTPATIENT
Start: 2020-08-13 | End: 2020-08-14 | Stop reason: HOSPADM

## 2020-08-13 RX ORDER — SODIUM CHLORIDE 0.9 % (FLUSH) 0.9 %
10 SYRINGE (ML) INJECTION PRN
Status: DISCONTINUED | OUTPATIENT
Start: 2020-08-13 | End: 2020-08-14 | Stop reason: HOSPADM

## 2020-08-13 RX ORDER — ASPIRIN 81 MG/1
81 TABLET, CHEWABLE ORAL DAILY
Status: DISCONTINUED | OUTPATIENT
Start: 2020-08-13 | End: 2020-08-14 | Stop reason: HOSPADM

## 2020-08-13 RX ORDER — LISINOPRIL 20 MG/1
40 TABLET ORAL DAILY
Status: DISCONTINUED | OUTPATIENT
Start: 2020-08-14 | End: 2020-08-14 | Stop reason: HOSPADM

## 2020-08-13 RX ADMIN — NITROGLYCERIN 0.4 MG: 0.4 TABLET, ORALLY DISINTEGRATING SUBLINGUAL at 02:06

## 2020-08-13 RX ADMIN — ENOXAPARIN SODIUM 40 MG: 40 INJECTION SUBCUTANEOUS at 16:23

## 2020-08-13 RX ADMIN — LISINOPRIL 40 MG: 20 TABLET ORAL at 16:24

## 2020-08-13 RX ADMIN — AMLODIPINE BESYLATE 10 MG: 10 TABLET ORAL at 16:25

## 2020-08-13 RX ADMIN — CIPROFLOXACIN 500 MG: 500 TABLET, FILM COATED ORAL at 03:23

## 2020-08-13 RX ADMIN — ASPIRIN 81 MG: 81 TABLET, CHEWABLE ORAL at 16:25

## 2020-08-13 RX ADMIN — FUROSEMIDE 20 MG: 20 TABLET ORAL at 16:25

## 2020-08-13 RX ADMIN — ATORVASTATIN CALCIUM 10 MG: 10 TABLET, FILM COATED ORAL at 21:31

## 2020-08-13 RX ADMIN — ISOSORBIDE MONONITRATE 30 MG: 30 TABLET, EXTENDED RELEASE ORAL at 16:25

## 2020-08-13 RX ADMIN — FUROSEMIDE 20 MG: 20 TABLET ORAL at 21:32

## 2020-08-13 RX ADMIN — METOPROLOL TARTRATE 50 MG: 50 TABLET, FILM COATED ORAL at 23:14

## 2020-08-13 RX ADMIN — LISINOPRIL 20 MG: 20 TABLET ORAL at 16:37

## 2020-08-13 RX ADMIN — Medication 1000 MG: at 16:30

## 2020-08-13 RX ADMIN — METOPROLOL TARTRATE 50 MG: 50 TABLET, FILM COATED ORAL at 16:25

## 2020-08-13 RX ADMIN — CLONIDINE HYDROCHLORIDE 0.2 MG: 0.1 TABLET ORAL at 21:31

## 2020-08-13 RX ADMIN — ASPIRIN 324 MG: 81 TABLET, CHEWABLE ORAL at 02:05

## 2020-08-13 RX ADMIN — NITROGLYCERIN 0.4 MG: 0.4 TABLET, ORALLY DISINTEGRATING SUBLINGUAL at 02:14

## 2020-08-13 RX ADMIN — CLONIDINE HYDROCHLORIDE 0.2 MG: 0.1 TABLET ORAL at 16:25

## 2020-08-13 RX ADMIN — DILTIAZEM HYDROCHLORIDE 10 MG: 5 INJECTION, SOLUTION INTRAVENOUS at 02:07

## 2020-08-13 RX ADMIN — Medication 10 ML: at 21:33

## 2020-08-13 ASSESSMENT — PAIN SCALES - GENERAL
PAINLEVEL_OUTOF10: 4
PAINLEVEL_OUTOF10: 0
PAINLEVEL_OUTOF10: 9
PAINLEVEL_OUTOF10: 0

## 2020-08-13 ASSESSMENT — ENCOUNTER SYMPTOMS
SHORTNESS OF BREATH: 1
NAUSEA: 0
VOMITING: 0
COLOR CHANGE: 0
CHEST TIGHTNESS: 1
ABDOMINAL PAIN: 0

## 2020-08-13 ASSESSMENT — PAIN DESCRIPTION - PAIN TYPE
TYPE: ACUTE PAIN
TYPE: ACUTE PAIN

## 2020-08-13 ASSESSMENT — PAIN DESCRIPTION - FREQUENCY
FREQUENCY: CONTINUOUS
FREQUENCY: CONTINUOUS

## 2020-08-13 ASSESSMENT — PAIN DESCRIPTION - ORIENTATION
ORIENTATION: LEFT
ORIENTATION: LEFT

## 2020-08-13 ASSESSMENT — PAIN DESCRIPTION - DESCRIPTORS
DESCRIPTORS: TIGHTNESS
DESCRIPTORS: TIGHTNESS

## 2020-08-13 ASSESSMENT — PAIN DESCRIPTION - ONSET: ONSET: ON-GOING

## 2020-08-13 ASSESSMENT — PAIN DESCRIPTION - PROGRESSION: CLINICAL_PROGRESSION: GRADUALLY IMPROVING

## 2020-08-13 ASSESSMENT — PAIN DESCRIPTION - LOCATION
LOCATION: CHEST
LOCATION: CHEST

## 2020-08-13 NOTE — CARE COORDINATION
[facility] Case Management Initial Discharge Assessment    Met with Patient's daughter to discuss discharge plan. PCP: Beto Walker MD                                Date of Last Visit:  6MO    If no PCP, list provided? N/A    Discharge Planning    Living Arrangements: independently at home    Who do you live with? DTR    Who helps you with your care:  self    If lives at home:     Do you have any barriers navigating in your home? no    Patient can perform ADL? Yes    Current Services (outpatient and in home) :  None    Dialysis: No    Is transportation available to get to your appointments? Yes- DTR DRIVES    DME Equipment:  yes - CANE, RAISED TOILET SEAT    Respiratory equipment: None    Respiratory provider:  no     Pharmacy:  yes - [facility] [address] AND WANTS TO SWITCH TO [facility] with Medication Assistance Program?  No      Patient agreeable to [facility]? Declined    Patient agreeable to SNF/Rehab? N/A    Other discharge needs identified? N/A    Freedom of choice list provided with basic dialogue that supports the patient's individualized plan of care/goals and shares the quality data associated with the providers. N/A    Does Patient Have a High-Risk for Readmission Diagnosis (CHF, PN, MI, COPD)? No      The plan for Transition of Care is related to the following treatment goals: CARDIO EVAL    Initial Discharge Plan? (Note: please see concurrent daily documentation for any updates after initial note). HOME, DENIES NEEDS.      The Patient and/or patient representative: DAUGHTER was provided with choice of any post-acute providers for care and equipment and agrees with discharge plan  Yes    Electronically signed by Maynor Bhakta RN on 8/13/2020 at 4:36 PM

## 2020-08-13 NOTE — H&P
History and Physical  Patient: Curt ISAACSKE/IMU:[id number]  YOB: 1950  MRN: [id number]  Acct: [de-identified]   Admitting Diagnosis: Chest pain [R07.9]  Admit Date:  8/13/2020  Hospital Day: 0      Chief Complaint:   Chest pain    History of Present Illness: This is a very pleasant 27-year-old  female with past medical history significant for coronary artery disease status post remote PCI of the LAD and circumflex, hypertension, dyslipidemia, diabetes and history of TIA who presented to the emergency room early this morning with a chief complaint of chest pain. She states that she been experiencing midsternal chest tightness and pressure yesterday evening around 8 PM while watching television. This pain radiated somewhat to her left shoulder and left arm and was unrelieved with nitroglycerin at home. She had mild associated shortness of breath and states her blood pressure was elevated. She denies nausea, vomiting, dizziness, lightheadedness, diaphoresis, palpitations, paroxysmal nocturnal dyspnea, orthopnea, syncope, fever or chills. She has had similar episodes of chest tightness but states they were never as severe. Due to her persistent pain despite nitro she presented to the ER for further evaluation. On presentation to the emergency room, blood pressure 129/71, heart rate 63, respiratory rate 18, pulse ox of 98%, temperature of 97.7 °F.  Sodium 144, potassium 3.6, chloride 104, total CO2 25, BUN 11, creatinine 0.41, GFR greater than 60, glucose 136. proBNP 296. Initial troponin negative less than 0.010. WBC 6.1, hemoglobin 12.5, hematocrit 38.5, platelets 219. Urinalysis unremarkable. Chest x-ray showed no radiographic evidence of acute cardiopulmonary disease. EKG showed sinus rhythm with ventricular rate of 75 bpm, T wave inversion in lead III and aVF and poor R wave progression in V3 through V6,  ms. She was admitted for further evaluation.     At time of evaluation today, she is seen on 1 W. resting comfortably and in no acute distress. Serial troponins have been negative x3 at less than 0.010. Serial vitals reviewed and blood pressure remains uncontrolled as high as 202/95 early this morning. On telemetry, she is sinus rhythm with heart rate in the 70s. She has had no ischemic evaluation or cardiac catheterization since 2012 at time of her last PCI.       Allergies   Allergen Reactions    Amoxicillin        Current Facility-Administered Medications   Medication Dose Route Frequency Provider Last Rate Last Dose    nitroGLYCERIN (NITROSTAT) SL tablet 0.4 mg  0.4 mg Sublingual Q5 Min PRN Thuy Jacobs MD   0.4 mg at 08/13/20 0214    sodium chloride flush 0.9 % injection 10 mL  10 mL Intravenous 2 times per day [facility] Holiday, DO        sodium chloride flush 0.9 % injection 10 mL  10 mL Intravenous PRN [facility] Holiday, DO        acetaminophen (TYLENOL) tablet 650 mg  650 mg Oral Q6H PRN [facility] Holiday, DO        Or    acetaminophen (TYLENOL) suppository 650 mg  650 mg Rectal Q6H PRN [facility] Holiday, DO        enoxaparin (LOVENOX) injection 40 mg  40 mg Subcutaneous Daily [facility] Holiday, DO        amLODIPine (NORVASC) tablet 10 mg  10 mg Oral Daily [place], [place]        aspirin chewable tablet 81 mg  81 mg Oral Daily [place], [place]        cloNIDine (CATAPRES) tablet 0.2 mg  0.2 mg Oral BID [place], [place]        dilTIAZem (CARDIZEM 12 HR) extended release capsule 90 mg  90 mg Oral BID [place], [place]        furosemide (LASIX) tablet 20 mg  20 mg Oral BID [place], [place]        [START ON 8/14/2020] glimepiride (AMARYL) tablet 2 mg  2 mg Oral QAM AC [place], [place]        isosorbide mononitrate (IMDUR) extended release tablet 15 mg  15 mg Oral [place]        lisinopril (PRINIVIL;ZESTRIL) tablet 20 mg  20 mg Oral Daily [place], [place]        atorvastatin (LIPITOR) tablet 10 mg  10 mg Oral Daily [place], [place]        metFORMIN (GLUCOPHAGE) tablet 500 mg  500 mg Oral BID  Vani Olguin        metoprolol tartrate (LOPRESSOR) tablet 50 mg  50 mg Oral BID [place], [place]        fish oil capsule 1,000 mg  1,000 mg Oral Daily [place], [place]        [START ON 8/14/2020] pantoprazole (PROTONIX) tablet 40 mg  40 mg Oral QAM  Vani Olguin        glucose (GLUTOSE) 40 % oral gel 15 g  15 g Oral PRN AUTUMN Olguin        dextrose 50 % IV solution  12.5 g Intravenous PRN AUTUMN Olguin        glucagon (rDNA) injection 1 mg  1 mg Intramuscular PRN AUTUMN Olguin        dextrose 5 % solution  100 mL/hr Intravenous PRN Vani Olguin        insulin lispro (HUMALOG) injection vial 0-6 Units  0-6 Units Subcutaneous TID  Vani Olguin        insulin lispro (HUMALOG) injection vial 0-3 Units  0-3 Units Subcutaneous Nightly Vani Olguin           PMHx:  Past Medical History:   Diagnosis Date    CAD (coronary artery disease)     [facility]     Dyslipidemia     FH: CAD (coronary artery disease)     History of TIA (transient ischemic attack)     HTN (hypertension)     [facility] 12/13/12       PSHx:  Past Surgical History:   Procedure Laterality Date    CORONARY ANGIOPLASTY  12/14/12    DIAGNOSTIC CARDIAC CATH LAB PROCEDURE  12/14/12    HYSTERECTOMY  03/2018       Social Hx:  Social History     Socioeconomic History    Marital status:       Spouse name: None    Number of children: None    Years of education: None    Highest education level: None   Occupational History    None   Social Needs    Financial resource strain: None    Food insecurity     Worry: None     Inability: None    Transportation needs     Medical: None     Non-medical: None   Tobacco Use    Smoking status: Never Smoker    Smokeless tobacco: Never Used   Substance and Sexual Activity    Alcohol use: No     Alcohol/week: 0.0 standard drinks    Drug use: No    Sexual activity: None   Lifestyle    Physical activity     Days per week: None     Minutes per session: None    Stress: None   Relationships    Social connections     Talks on phone: None     Gets together: None     Attends Hoahaoism service: None     Active member of club or organization: None     Attends meetings of clubs or organizations: None     Relationship status: None    Intimate partner violence     Fear of current or ex partner: None     Emotionally abused: None     Physically abused: None     Forced sexual activity: None   Other Topics Concern    None   Social History Narrative    None       Family Hx:  Family History   Problem Relation Age of Onset    Heart Disease Mother     Heart Disease Father     Heart Disease Sister     Heart Disease Brother        Review ofSystems:   Review of Systems   Constitutional: Negative for activity change, fatigue and fever. HENT: Negative for congestion. Respiratory: Positive for chest tightness and shortness of breath. Cardiovascular: Positive for chest pain. Negative for palpitations and leg swelling. Gastrointestinal: Negative for abdominal pain, nausea and vomiting. Genitourinary: Negative for difficulty urinating. Musculoskeletal: Negative for arthralgias. Skin: Negative for color change, pallor and rash. Neurological: Negative for dizziness, syncope and light-headedness. Psychiatric/Behavioral: Negative for agitation and behavioral problems. Physical Examination:    BP (!) 160/69   Pulse 79   Temp 98.6 °F (37 °C) (Oral)   Resp 16   Ht 5' 3\" (1.6 m)   Wt 181 lb (82.1 kg)   SpO2 97%   BMI 32.06 kg/m²    Physical Exam  Constitutional:       General: She is not in acute distress. Appearance: Normal appearance. She is obese. HENT:      Head: Normocephalic and atraumatic.    Neck:      Musculoskeletal: Normal range of motion and neck supple. Cardiovascular:      Rate and Rhythm: Normal rate and regular rhythm. Heart sounds: Murmur present. Pulmonary:      Effort: Pulmonary effort is normal. No respiratory distress. Breath sounds: No wheezing, rhonchi or rales. Abdominal:      Palpations: Abdomen is soft. Tenderness: There is no abdominal tenderness. Comments: Obese abdomen   Musculoskeletal: Normal range of motion. Right lower leg: No edema. Left lower leg: No edema. Skin:     General: Skin is warm and dry. Neurological:      General: No focal deficit present. Mental Status: She is alert and oriented to person, place, and time. Cranial Nerves: No cranial nerve deficit.    Psychiatric:         Mood and Affect: Mood normal.         Behavior: Behavior normal.          LABS:  CBC:   Lab Results   Component Value Date    WBC 6.1 08/13/2020    RBC 4.52 08/13/2020    RBC 4.64 03/22/2012    HGB 12.5 08/13/2020    HCT 38.5 08/13/2020    MCV 85.2 08/13/2020    MCH 27.6 08/13/2020    MCHC 32.4 08/13/2020    RDW 14.9 08/13/2020     08/13/2020    MPV 8.5 05/05/2014     CBC with Differential:   Lab Results   Component Value Date    WBC 6.1 08/13/2020    RBC 4.52 08/13/2020    RBC 4.64 03/22/2012    HGB 12.5 08/13/2020    HCT 38.5 08/13/2020     08/13/2020    MCV 85.2 08/13/2020    MCH 27.6 08/13/2020    MCHC 32.4 08/13/2020    RDW 14.9 08/13/2020    LYMPHOPCT 31.9 08/13/2020    MONOPCT 8.2 08/13/2020    EOSPCT 3.5 03/22/2012    BASOPCT 1.0 08/13/2020    MONOSABS 0.5 08/13/2020    LYMPHSABS 1.9 08/13/2020    EOSABS 0.2 08/13/2020    BASOSABS 0.1 08/13/2020     CMP:    Lab Results   Component Value Date     08/13/2020    K 3.6 08/13/2020     08/13/2020    CO2 25 08/13/2020    BUN 11 08/13/2020    CREATININE 0.41 08/13/2020    GFRAA >60.0 08/13/2020    LABGLOM >60.0 08/13/2020    GLUCOSE 136 08/13/2020    GLUCOSE 102 03/22/2012    PROT 6.7 08/13/2020    LABALBU 4.0 08/13/2020 LABALBU 4.2 2012    CALCIUM 9.4 2020    BILITOT <0.2 2020    ALKPHOS 52 2020    AST 15 2020    ALT 15 2020     BMP:    Lab Results   Component Value Date     2020    K 3.6 2020     2020    CO2 25 2020    BUN 11 2020    LABALBU 4.0 2020    LABALBU 4.2 2012    CREATININE 0.41 2020    CALCIUM 9.4 2020    GFRAA >60.0 2020    LABGLOM >60.0 2020    GLUCOSE 136 2020    GLUCOSE 102 2012     Magnesium:    Lab Results   Component Value Date    MG 2.1 2014    MG 1.7 2011     Troponin:    Lab Results   Component Value Date    TROPONINI <0.010 2020     Recent Labs     20  0145   PROBNP 296     Recent Labs     20  0145   INR 1.0       RADIOLOGY:  Xr Chest Portable    Result Date: 2020  Patient MRN: [id number] : 1950 Age:  71 years Gender: Female Order Date: 2020 2:00 AM. Exam: XR CHEST PORTABLE Number of Views: 1 Indication:  R07.9 Chest pain, unspecified type ICD10, hypertension. Comparison: 2/3/2019 Findings: Vascular calcifications thoracic aorta. Cardiomediastinal silhouette is within normal limits. Lungs are clear without evidence of infiltrate/pneumonia, pneumothorax or pleural effusion. Impression:  No radiographic evidence of acute cardiopulmonary disease. Vascular calcifications thoracic aorta.       Left heart cath 2012:  Left main coronary artery-normal  LAD-- diffuse irregularity with multiple stenosis, 70% proximal LAD status post PCI and 65% mid  Left circumflex-- 20% mid stenosis and patent previously placed mid vessel stent with mild ISR  RCA--40% mid stenosis and RPDA with 40% proximal stenosis      EKG 20: SR 75, mild ST depression with T wave inversion leads III and aVF, poor R wave progression V3-V6, QTc 431ms    Telemetry 20: SR 70s      Assessment:    Active Hospital Problems    Diagnosis Date Noted    Chest pain [R07.9] 08/13/2020       1. Chest pain concerning for unstable angina  2. Hx CAD s/p  most recent PCI of prox/mid LAD in 2012  3. HTN--uncontrolled  4. Dyslipidemia  5. DM--HgbA1c 6.5  6. Obesity  7. Abnormal EKG    Plan:  1. Maximize medical therapy- aspirin 81 mg p.o. daily, Lopressor 50 mg p.o. twice daily, Cardizem 90 mg p.o. twice daily, clonidine 0.2 mg p.o. twice daily, amlodipine 10 mg p.o. daily, increase Imdur 30 mg p.o. daily, Lasix 20 mg p.o. twice daily, glimepiride 2 mg tablet p.o. daily, metformin 500 mg p.o. twice daily, sliding scale insulin as ordered, lingual nitroglycerin, sublingual nitroglycerin as needed for chest pain  2. Cardiac/diabetic diet recommended  3. Check echocardiogram  4. Monitor on telemetry for any tachycardia or bradycardia arrhythmias  5. Maintain potassium greater than 4, magnesium greater than 2  6. GI/ DVT prophylaxis  7. Coronary evaluation per Dr. Clara Forrester likely need cardiac catheterization for definitive diagnosis given multiple risk factors for coronary artery disease including hypertension, dyslipidemia, diabetes and known history of coronary artery disease with prior remote PCI. Presenting symptoms are concerning for unstable angina. 8. Further recommendations to follow          Electronically signed by AUTUMN Will on 8/13/2020 at 3:39 PM    Attending Supervising [de-identified] Attestation Statement  The patient is a 71 y.o. female. I have performed a history and physical examination of the patient. I discussed the case with the physician assistant. I reviewed the patient's Past Medical History, Past Surgical History, Medications, and Allergies.      Physical Exam:  Vitals:    08/13/20 0515 08/13/20 0615 08/13/20 0713 08/13/20 1414   BP: (!) 202/95  (!) 152/67 (!) 160/69   Pulse:   68 79   Resp:   16 16   Temp:   98.2 °F (36.8 °C) 98.6 °F (37 °C)   TempSrc:   Oral Oral   SpO2:   100% 97%   Weight:  181 lb (82.1 kg)     Height:           Review of Systems - Respiratory ROS: positive for - shortness of breath  Cardiovascular ROS: positive for - chest pain  Gastrointestinal ROS: no abdominal pain, change in bowel habits, or black or bloody stools    Pulmonary/Chest: clear to auscultation bilaterally- no wheezes, rales or rhonchi, normal air movement, no respiratory distress  Cardiovascular: normal rate, normal S1 and S2, no gallops, intact distal pulses and no carotid bruits  Abdomen: soft, non-tender, non-distended, normal bowel sounds, no masses or organomegaly    Active Hospital Problems    Diagnosis Date Noted    Chest pain [R07.9] 08/13/2020        I reviewed and agree with the findings and plan documented in her note . Impression/    Typical angina, angina class IV  History of CAD status post remote PCI to LAD and circumflex  Essential hypertension  Hyperlipidemia  History of diabetes  Morbid obesity  Likely EVITA  Carotid artery disease history of bilateral carotid endarterectomy. Plan     1. Had a long talk with the patient regarding their symptoms, test results and the different treatment options available which include cardiac cath, alternate imaging modality and medical therapy. Patient would like to proceed with cardiac catheterization and understands the risks and benefits of the procedure. 2. Check 2D Echo for LV function, PA pressures, wall motion abnormalities and any significant valvular disease. 3. Max cardiac meds  4. Monitor on tele  5. Keep potassium greater than 4, magnesium greater than 2  6. Outpatient sleep study for EVITA evaluation  7. Carotid ultrasound eventually as outpatient  8. GI/DVT prophylaxis  9. Patient on Cardizem and Norvasc. Will discontinue Cardizem      Thank you for allowing me to participate in the care of your patient, please don't hesitate to contact me if you have any further questions.            Electronically signed by Chad Navarro DO on 8/13/20 at 6:10 PM EDT

## 2020-08-13 NOTE — ED PROVIDER NOTES
eMERGENCY dEPARTMENT eNCOUnter      [address] [facility]    Chief Complaint   Patient presents with    Chest Pain     htn     Note-patient is Sammarinese-speaking. Daughter translates for her. ADA Goff is a 71 y.o. female who presentsto ED from home  By private car  With complaint of chest pain, high blood pressure  Onset 4 hours ago  Intensity of symptoms 8/10 , pressure with radiation to left arm   Location of symptoms substernal   Patient also had elevated blood pressure last night for which she took her meds and went back to bed. But the chest pain woke her up again. Patient denies any shortness of breath, cough with expectoration,    PAST MEDICAL HISTORY    Past Medical History:   Diagnosis Date    CAD (coronary artery disease)     [facility] mellitus ([facility]     Dyslipidemia     FH: CAD (coronary artery disease)     History of TIA (transient ischemic attack)     HTN (hypertension)     Unstable angina ([facility] 12/13/12       SURGICAL HISTORY    Past Surgical History:   Procedure Laterality Date    CORONARY ANGIOPLASTY  12/14/12    DIAGNOSTIC CARDIAC CATH LAB PROCEDURE  12/14/12    HYSTERECTOMY  03/2018       CURRENT MEDICATIONS    Current Outpatient Rx   Medication Sig Dispense Refill    cloNIDine (CATAPRES) 0.2 MG tablet Take 1 tablet by mouth 2 times daily Do not stop this medication suddenly.   Taper this medication off gradually if you want to discontinue 60 tablet 0    meclizine (ANTIVERT) 25 MG tablet And every 6-8 hours as needed for dizziness 20 tablet 0    furosemide (LASIX) 20 MG tablet Take 1 tablet by mouth 2 times daily 4 tablet 0    azelastine (ASTELIN) 0.1 % nasal spray 1 spray by Nasal route 2 times daily Use in each nostril as directed 1 Bottle 3    albuterol sulfate  (90 Base) MCG/ACT inhaler Inhale 2 puffs into the lungs every 6 hours as needed for Wheezing 1 Inhaler 0    naproxen (NAPROSYN) 500 MG tablet Take 1 tablet by mouth 2 times daily for 20 doses 20 tablet 0    cyclobenzaprine (FLEXERIL) 10 MG tablet Take 1 tablet by mouth 3 times daily as needed for Muscle spasms 12 tablet 0    Capsaicin 0.1 % CREA Apply 1 applicator topically 3 times daily as needed (right thigh pain) 56.6 g 0    aspirin 81 MG chewable tablet Take 81 mg by mouth daily      HYDROcodone-acetaminophen (NORCO) 7.5-325 MG per tablet Take 1 tablet by mouth every 8 hours as needed for Pain . 12 tablet 0    fluticasone (FLONASE) 50 MCG/ACT nasal spray 2 sprays by Nasal route every 12 hours for 15 days 1 Bottle 0    isosorbide mononitrate (IMDUR) 30 MG CR tablet Take 15 mg by mouth daily   6    ibuprofen (ADVIL;MOTRIN) 800 MG tablet Take 800 mg by mouth every 8 hours as needed for Pain   2    glimepiride (AMARYL) 2 MG tablet Take 2 mg by mouth every morning (before breakfast)   2    omeprazole (PRILOSEC) 20 MG capsule Take 20 mg by mouth Daily   3    metoprolol (LOPRESSOR) 25 MG tablet Take 50 mg by mouth 2 times daily       amLODIPine (NORVASC) 10 MG tablet Take 10 mg by mouth daily.  lisinopril (PRINIVIL;ZESTRIL) 20 MG tablet Take 20 mg by mouth daily.  aspirin 325 MG EC tablet Take 325 mg by mouth daily.  lovastatin (MEVACOR) 40 MG tablet Take 40 mg by mouth nightly.  metformin (GLUCOPHAGE) 500 MG tablet Take 500 mg by mouth 2 times daily (with meals).  diltiazem (CARDIZEM SR) 90 MG SR capsule Take 90 mg by mouth 2 times daily.  Omega-3 Fatty Acids (FISH OIL) 1000 MG CAPS Take 1,000 mg by mouth daily. ALLERGIES    Allergies   Allergen Reactions    Amoxicillin        FAMILY HISTORY    Family History   Problem Relation Age of Onset    Heart Disease Mother     Heart Disease Father     Heart Disease Sister     Heart Disease Brother        SOCIAL HISTORY    Social History     Socioeconomic History    Marital status:       Spouse name: None    Number of children: None    Years of education: None    Highest education level: None Occupational History    None   Social Needs    Financial resource strain: None    Food insecurity     Worry: None     Inability: None    Transportation needs     Medical: None     Non-medical: None   Tobacco Use    Smoking status: Never Smoker    Smokeless tobacco: Never Used   Substance and Sexual Activity    Alcohol use: No     Alcohol/week: 0.0 standard drinks    Drug use: No    Sexual activity: None   Lifestyle    Physical activity     Days per week: None     Minutes per session: None    Stress: None   Relationships    Social connections     Talks on phone: None     Gets together: None     Attends Yazidi service: None     Active member of club or organization: None     Attends meetings of clubs or organizations: None     Relationship status: None    Intimate partner violence     Fear of current or ex partner: None     Emotionally abused: None     Physically abused: None     Forced sexual activity: None   Other Topics Concern    None   Social History Narrative    None       REVIEW OF SYSTEMS    Constitutional:  Denies fever, chills, weight loss or weakness   Eyes:  Denies photophobia or discharge   HENT:  Denies sore throat or ear pain   Respiratory:  Denies cough or shortness of breath   Cardiovascular:  c/o chest pain,but denies palpitations or swelling   GI:  Denies abdominal pain, nausea, vomiting, or diarrhea   Musculoskeletal:  Denies back pain   Skin:  Denies rash   Neurologic:  Denies headache, focal weakness or sensory changes   Endocrine:  Denies polyuria or polydypsia   Lymphatic:  Denies swollen glands   Psychiatric:  Denies depression, suicidal ideation or homicidal ideation   All systems negative except as marked. PHYSICAL EXAM    VITAL SIGNS: /71   Pulse 63   Temp 97.7 °F (36.5 °C) (Oral)   Resp 18   Ht 5' 3\" (1.6 m)   Wt 187 lb (84.8 kg)   SpO2 98%   BMI 33.13 kg/m²    Constitutional: Obese, mild acute  distress, Non-toxic appearance.    HENT:  Normocephalic, Atraumatic, Bilateral external ears normal, Oropharynx moist, No oral exudates, Nose normal. Neck- Normal range of motion, No tenderness, Supple, No stridor. Eyes:  PERRL, EOMI, Conjunctiva normal, No discharge. Respiratory:  Normal breath sounds, No respiratory distress, No wheezing, No chest tenderness. Cardiovascular:  Normal heart rate, Normal rhythm, No murmurs, No rubs, No gallops. GI:  Bowel sounds normal, Soft, No tenderness, No masses, No pulsatile masses. : No CVA tenderness. Musculoskeletal:  Intact distal pulses, No edema, No tenderness, No cyanosis, No clubbing. Good range of motion in all major joints. No tenderness to palpation or major deformities noted. Back- No tenderness. Integument:  Warm, Dry, No erythema, No rash. Lymphatic:  No lymphadenopathy noted. Neurologic:  Alert & oriented x 3, Normal motor function, Normal sensory function, No focal deficits noted. Psychiatric:  Affect normal, Judgment normal, Mood normal.     EKG    NSR, HR 75 , Normal Axis, No ST changes, T wave inversion lead 3, T wave in avf flat , QTc 431    RADIOLOGY    XR CHEST PORTABLE    (Results Pending)       REEVALUATION   Patient was updated the results of labs and Radiology. BP improved   Chest Pain resolved   Spoke with the hospitalist accepted admission.     Labs  Labs Reviewed   CBC WITH AUTO DIFFERENTIAL - Abnormal; Notable for the following components:       Result Value    MCHC 32.4 (*)     RDW 14.9 (*)     All other components within normal limits   COMPREHENSIVE METABOLIC PANEL W/ REFLEX TO MG FOR LOW K - Abnormal; Notable for the following components:    Glucose 136 (*)     CREATININE 0.41 (*)     All other components within normal limits   URINALYSIS - Abnormal; Notable for the following components:    Leukocyte Esterase, Urine SMALL (*)     All other components within normal limits   TROPONIN   BRAIN NATRIURETIC PEPTIDE   PROTIME-INR   APTT   MICROSCOPIC URINALYSIS

## 2020-08-13 NOTE — ED TRIAGE NOTES
Patient presents with complaints of elevated blood pressure and chest pain that began around 8pm this evening. Patient denies nausea, shortness of breath, or other complaints.

## 2020-08-13 NOTE — FLOWSHEET NOTE
"Discharge Planning Assessment  [facility]     Patient Name: Bhargavi Crowder  MRN: [id number]  Today's Date: 6/23/2017    Admit Date: 6/22/2017          Discharge Needs Assessment       06/23/17 1003    Living Environment    Lives With spouse    Living Arrangements house    Number of Stairs to Enter Home 1    Number of Stairs Within Home 15    Type of Financial/Environmental Concern none    Transportation Available car;family or friend will provide    Living Environment    Provides Primary Care For no one    Quality Of Family Relationships supportive;helpful;involved    Able to Return to Prior Living Arrangements yes    Discharge Needs Assessment    Concerns To Be Addressed no discharge needs identified;denies needs/concerns at this time    Readmission Within The Last 30 Days no previous admission in last 30 days    Anticipated Changes Related to Illness none    Equipment Currently Used at Home none    Equipment Needed After Discharge none    Discharge Disposition home or self-care    Discharge Contact Information if Applicable Brandon Patel, spouse  [phone]            Discharge Plan       06/23/17 1004    Case Management/Social Work Plan    Plan Home    Patient/Family In Agreement With Plan yes    Additional Comments Spoke with patient and  at bedside regarding discharge planning.  Patient has used Home Health in the past for SN/dressing changes of infected breast tissue after breast cancer/reconstruction.  Patient does not remember what HH agency it was that saw her.  Denies difficulty affording medications.  Patient lives with her  in  single level home with a basement and one step to access.  Patient denies concerns regarding home/stair safety, \"I always use the handrail\".  No anticipated discharge needs noted.  Patient plans to discharge home via car with family to transport when medically ready.          Discharge Placement     No information found        Expected Discharge Date and " pts dtr here to visit but was not wearing a mask. Katherine Ramirez  spoke with her about the mask policy. Pts dtr was escorted out of the facility. Pt is to have a heart cath tomorrow and the dtr is aware. Time     Expected Discharge Date Expected Discharge Time    Jun 24, 2017               Demographic Summary       06/23/17 1002    Referral Information    Admission Type observation    Arrived From home or self-care    Referral Source admission list    Reason For Consult discharge planning    Record Reviewed clinical discipline documentation;history and physical;patient profile    Primary Care Physician Information    Name Nathaly Ramirez MD            Functional Status       06/23/17 1002    Functional Status Current    Current Functional Level Comment Per Nursing Assessment    Functional Status Prior    Ambulation 0-->independent    Transferring 0-->independent    Toileting 0-->independent    Bathing 0-->independent    Dressing 0-->independent    Eating 0-->independent    Communication 0-->understands/communicates without difficulty    Swallowing 0-->swallows foods/liquids without difficulty    IADL    Medications independent    Meal Preparation independent    Housekeeping independent    Laundry independent    Shopping independent    Oral Care independent    Activity Tolerance    Current Activity Limitations lifting restrictions    Usual Activity Tolerance good    Current Activity Tolerance good    Employment/Financial    Employment/Finance Comments Medicare/[facility]            Psychosocial     None            Abuse/Neglect     None            Legal     None            Substance Abuse     None            Patient Forms     None          Alondra Miller RN

## 2020-08-14 ENCOUNTER — APPOINTMENT (OUTPATIENT)
Dept: CARDIAC CATH/INVASIVE PROCEDURES | Age: 70
DRG: 247 | End: 2020-08-14
Payer: MEDICARE

## 2020-08-14 VITALS
HEIGHT: 63 IN | TEMPERATURE: 98.1 F | OXYGEN SATURATION: 94 % | DIASTOLIC BLOOD PRESSURE: 62 MMHG | HEART RATE: 77 BPM | BODY MASS INDEX: 32.07 KG/M2 | WEIGHT: 181 LBS | RESPIRATION RATE: 19 BRPM | SYSTOLIC BLOOD PRESSURE: 110 MMHG

## 2020-08-14 LAB
GLUCOSE BLD-MCNC: 124 MG/DL (ref 60–115)
HCT VFR BLD CALC: 37.2 % (ref 37–47)
HEMOGLOBIN: 12 G/DL (ref 12–16)
MCH RBC QN AUTO: 27.7 PG (ref 27–31.3)
MCHC RBC AUTO-ENTMCNC: 32.3 % (ref 33–37)
MCV RBC AUTO: 85.9 FL (ref 82–100)
PDW BLD-RTO: 14.6 % (ref 11.5–14.5)
PERFORMED ON: ABNORMAL
PLATELET # BLD: 200 K/UL (ref 130–400)
RBC # BLD: 4.33 M/UL (ref 4.2–5.4)
WBC # BLD: 5 K/UL (ref 4.8–10.8)

## 2020-08-14 PROCEDURE — 2580000003 HC RX 258: Performed by: PHYSICIAN ASSISTANT

## 2020-08-14 PROCEDURE — C1887 CATHETER, GUIDING: HCPCS

## 2020-08-14 PROCEDURE — 6370000000 HC RX 637 (ALT 250 FOR IP): Performed by: PHYSICIAN ASSISTANT

## 2020-08-14 PROCEDURE — C9600 PERC DRUG-EL COR STENT SING: HCPCS | Performed by: INTERNAL MEDICINE

## 2020-08-14 PROCEDURE — B2151ZZ FLUOROSCOPY OF LEFT HEART USING LOW OSMOLAR CONTRAST: ICD-10-PCS | Performed by: INTERNAL MEDICINE

## 2020-08-14 PROCEDURE — C1725 CATH, TRANSLUMIN NON-LASER: HCPCS

## 2020-08-14 PROCEDURE — 92929 PR PRQ TRLUML CORONARY STENT W/ANGIO ADDL ART/BRNCH: CPT | Performed by: INTERNAL MEDICINE

## 2020-08-14 PROCEDURE — 0271356 DILATION OF CORONARY ARTERY, TWO ARTERIES, BIFURCATION, WITH TWO DRUG-ELUTING INTRALUMINAL DEVICES, PERCUTANEOUS APPROACH: ICD-10-PCS | Performed by: INTERNAL MEDICINE

## 2020-08-14 PROCEDURE — 93458 L HRT ARTERY/VENTRICLE ANGIO: CPT | Performed by: INTERNAL MEDICINE

## 2020-08-14 PROCEDURE — 6360000002 HC RX W HCPCS

## 2020-08-14 PROCEDURE — 2709999900 HC NON-CHARGEABLE SUPPLY

## 2020-08-14 PROCEDURE — C1894 INTRO/SHEATH, NON-LASER: HCPCS

## 2020-08-14 PROCEDURE — 85347 COAGULATION TIME ACTIVATED: CPT

## 2020-08-14 PROCEDURE — B2111ZZ FLUOROSCOPY OF MULTIPLE CORONARY ARTERIES USING LOW OSMOLAR CONTRAST: ICD-10-PCS | Performed by: INTERNAL MEDICINE

## 2020-08-14 PROCEDURE — C1874 STENT, COATED/COV W/DEL SYS: HCPCS

## 2020-08-14 PROCEDURE — 2580000003 HC RX 258

## 2020-08-14 PROCEDURE — 85027 COMPLETE CBC AUTOMATED: CPT

## 2020-08-14 PROCEDURE — 93306 TTE W/DOPPLER COMPLETE: CPT

## 2020-08-14 PROCEDURE — 99238 HOSP IP/OBS DSCHRG MGMT 30/<: CPT | Performed by: INTERNAL MEDICINE

## 2020-08-14 PROCEDURE — 4A023N7 MEASUREMENT OF CARDIAC SAMPLING AND PRESSURE, LEFT HEART, PERCUTANEOUS APPROACH: ICD-10-PCS | Performed by: INTERNAL MEDICINE

## 2020-08-14 PROCEDURE — C1769 GUIDE WIRE: HCPCS

## 2020-08-14 PROCEDURE — 2500000003 HC RX 250 WO HCPCS

## 2020-08-14 PROCEDURE — 6370000000 HC RX 637 (ALT 250 FOR IP)

## 2020-08-14 PROCEDURE — 92928 PRQ TCAT PLMT NTRAC ST 1 LES: CPT | Performed by: INTERNAL MEDICINE

## 2020-08-14 PROCEDURE — 6360000004 HC RX CONTRAST MEDICATION: Performed by: INTERNAL MEDICINE

## 2020-08-14 PROCEDURE — 36415 COLL VENOUS BLD VENIPUNCTURE: CPT

## 2020-08-14 RX ORDER — NITROGLYCERIN 0.4 MG/1
TABLET SUBLINGUAL
Qty: 25 TABLET | Refills: 3 | Status: SHIPPED | OUTPATIENT
Start: 2020-08-14

## 2020-08-14 RX ORDER — LISINOPRIL 40 MG/1
40 TABLET ORAL DAILY
Qty: 30 TABLET | Refills: 3 | Status: ON HOLD | OUTPATIENT
Start: 2020-08-15 | End: 2021-06-03

## 2020-08-14 RX ORDER — SODIUM CHLORIDE 9 MG/ML
INJECTION, SOLUTION INTRAVENOUS CONTINUOUS
Status: ACTIVE | OUTPATIENT
Start: 2020-08-14 | End: 2020-08-14

## 2020-08-14 RX ORDER — SODIUM CHLORIDE 0.9 % (FLUSH) 0.9 %
10 SYRINGE (ML) INJECTION PRN
Status: DISCONTINUED | OUTPATIENT
Start: 2020-08-14 | End: 2020-08-14 | Stop reason: HOSPADM

## 2020-08-14 RX ORDER — SODIUM CHLORIDE 9 MG/ML
INJECTION, SOLUTION INTRAVENOUS CONTINUOUS
Status: DISCONTINUED | OUTPATIENT
Start: 2020-08-14 | End: 2020-08-14 | Stop reason: SDUPTHER

## 2020-08-14 RX ORDER — SODIUM CHLORIDE 0.9 % (FLUSH) 0.9 %
10 SYRINGE (ML) INJECTION EVERY 12 HOURS SCHEDULED
Status: DISCONTINUED | OUTPATIENT
Start: 2020-08-14 | End: 2020-08-14 | Stop reason: HOSPADM

## 2020-08-14 RX ADMIN — SODIUM CHLORIDE: 9 INJECTION, SOLUTION INTRAVENOUS at 09:39

## 2020-08-14 RX ADMIN — ISOSORBIDE MONONITRATE 30 MG: 30 TABLET, EXTENDED RELEASE ORAL at 09:40

## 2020-08-14 RX ADMIN — METOPROLOL TARTRATE 50 MG: 50 TABLET, FILM COATED ORAL at 09:40

## 2020-08-14 RX ADMIN — CLONIDINE HYDROCHLORIDE 0.2 MG: 0.1 TABLET ORAL at 09:39

## 2020-08-14 RX ADMIN — PANTOPRAZOLE SODIUM 40 MG: 40 TABLET, DELAYED RELEASE ORAL at 06:02

## 2020-08-14 RX ADMIN — Medication 10 ML: at 09:44

## 2020-08-14 RX ADMIN — GLIMEPIRIDE 2 MG: 4 TABLET ORAL at 06:02

## 2020-08-14 RX ADMIN — ASPIRIN 81 MG: 81 TABLET, CHEWABLE ORAL at 09:40

## 2020-08-14 RX ADMIN — LISINOPRIL 40 MG: 20 TABLET ORAL at 09:40

## 2020-08-14 RX ADMIN — AMLODIPINE BESYLATE 10 MG: 10 TABLET ORAL at 09:39

## 2020-08-14 RX ADMIN — Medication 1000 MG: at 09:39

## 2020-08-14 RX ADMIN — IOPAMIDOL 110 ML: 612 INJECTION, SOLUTION INTRAVENOUS at 16:06

## 2020-08-14 RX ADMIN — FUROSEMIDE 20 MG: 20 TABLET ORAL at 09:39

## 2020-08-14 ASSESSMENT — PAIN SCALES - GENERAL
PAINLEVEL_OUTOF10: 0

## 2020-08-14 NOTE — PROGRESS NOTES
Received post PCI to pre post cath space 8. Alert and oriented x4. No chest pain or SOB. States she is sleepy. Right radial vasc band on and stable. Right groin angioseal clean dry intact. Will monitor.

## 2020-08-14 NOTE — PROGRESS NOTES
Spiritual Care Services     Summary of Visit:  Pleasant woman visited as part of regular rounding. Patient was anticipating a catherization.  offered prayer and encouragement. Spiritual Assessment/Intervention/Outcomes:    Encounter Summary  Services provided to[de-identified] Patient  Referral/Consult From[de-identified] Rounding  Support System: Children  Place of Taoist: Natasha Mclain/[employer]  Contact Mormonism: No  Continue Visiting: No  Complexity of Encounter: Low  Length of Encounter: 15 minutes  Spiritual Assessment Completed: Yes  Routine  Type: Initial  Assessment: Calm, Approachable, Hopeful, Coping  Intervention: Explored feelings, thoughts, concerns, Nurtured hope, Prayer, Discussed relationship with God, Discussed belief system/Pentecostalism practices/carrie, Discussed illness/injury and it's impact  Outcome: Comfort, Expressed gratitude, Engaged in conversation, Receptive, Encouraged              Advance Directives (For Healthcare)  Pre-existing DNR Comfort Care/DNR Arrest/DNI Order: No  Healthcare Directive: No, patient does not have an advance directive for healthcare treatment  Advance Directives: Pt. not interested at this time           Values / Beliefs  Do you have any ethnic, cultural, sacramental, or spiritual Pentecostalism needs you would like us to be aware of while you are in the hospital?: No    Care Plan:    No follow up needed. Spiritual Care Services   Electronically signed by Irena Perez on 8/14/20 at 3:08 PM EDT     To reach a  for emotional and spiritual support, place an [facility] consult request.   If a  is needed immediately, dial 0 and ask to page the on-call .

## 2020-08-14 NOTE — DISCHARGE SUMMARY
Discharge Summary    Date:8/14/2020        Markie Cheema     YOB: 1950     Age:69 y.o. Admit Date:8/13/2020   Admission Condition:good   Discharged Condition:good  Discharge Date: 08/14/20     Discharge Diagnoses   Active Problems:    Chest pain  Resolved Problems:    * No resolved hospital problems. [facility] Stay   Narrative of Hospital Course: presented with typical Angina. S/p LHC with PCI of PDA/PLVB    Consultants:   IP CONSULT TO CARDIAC REHAB    Time Spent on Discharge:  25 minutes were spent in patient examination, evaluation, counseling as well as medication reconciliation, prescriptions for required medications, discharge plan and follow up.       Surgeries/Procedures Performed:   PCI of RCA PLVB/PDA    Treatments:   PCI of RCA    Significant Diagnostic Studies:   Recent Labs:  CBC:   Lab Results   Component Value Date    WBC 5.0 08/14/2020    RBC 4.33 08/14/2020    RBC 4.64 03/22/2012    HGB 12.0 08/14/2020    HCT 37.2 08/14/2020    MCV 85.9 08/14/2020    MCH 27.7 08/14/2020    MCHC 32.3 08/14/2020    RDW 14.6 08/14/2020     08/14/2020     BMP:    Lab Results   Component Value Date    GLUCOSE 136 08/13/2020    GLUCOSE 102 03/22/2012     08/13/2020    K 3.6 08/13/2020     08/13/2020    CO2 25 08/13/2020    ANIONGAP 15 08/13/2020    BUN 11 08/13/2020    CREATININE 0.41 08/13/2020    CALCIUM 9.4 08/13/2020    LABGLOM >60.0 08/13/2020    GFRAA >60.0 08/13/2020     HFP:    Lab Results   Component Value Date    PROT 6.7 08/13/2020     CMP:    Lab Results   Component Value Date    GLUCOSE 136 08/13/2020    GLUCOSE 102 03/22/2012     08/13/2020    K 3.6 08/13/2020     08/13/2020    CO2 25 08/13/2020    BUN 11 08/13/2020    CREATININE 0.41 08/13/2020    ANIONGAP 15 08/13/2020    ALKPHOS 52 08/13/2020    ALT 15 08/13/2020    AST 15 08/13/2020    BILITOT <0.2 08/13/2020    LABALBU 4.0 08/13/2020    LABALBU 4.2 03/22/2012    LABGLOM >60.0 08/13/2020    GFRAA >60.0 08/13/2020    PROT 6.7 08/13/2020    CALCIUM 9.4 08/13/2020     PT/INR:    Lab Results   Component Value Date    PROTIME 12.9 08/13/2020    PROTIME 10.0 12/06/2011    INR 1.0 08/13/2020     PTT:   Lab Results   Component Value Date    APTT 25.5 08/13/2020     FLP:    Lab Results   Component Value Date    CHOL 148 02/12/2014    TRIG 187 02/12/2014    HDL 37 02/12/2014     U/A:    Lab Results   Component Value Date    COLORU Yellow 08/13/2020    SPECGRAV 1.006 08/13/2020    PHUR 7.0 08/13/2020    PROTEINU Negative 08/13/2020    GLUCOSEU Negative 08/13/2020    KETUA Negative 08/13/2020    BILIRUBINUR Negative 08/13/2020    UROBILINOGEN 0.2 08/13/2020    NITRU Negative 08/13/2020    LEUKOCYTESUR SMALL 08/13/2020     TSH:    Lab Results   Component Value Date    TSH 0.383 02/12/2014       Radiology Last 7 Days:  Xr Chest Portable    Result Date: 8/13/2020  Impression:  No radiographic evidence of acute cardiopulmonary disease. Vascular calcifications thoracic aorta. Discharge Plan   Disposition: Home    Provider Follow-Up:   Monica Calhoun MD  [address], [address] [address] [phone]          TNY V [id number], DO  [address] [place] [address]  [address] [facility]  [phone]    In 3 weeks  Post procedure follow up., Post hospitalization follow up       Hospital/Incidental Findings Requiring Follow-Up:      Patient Instructions   Diet: cardiac diet    Activity: activity as tolerated    Other Instructions:       Discharge Medications         Medication List      START taking these medications    nitroGLYCERIN 0.4 MG SL tablet  Commonly known as:  NITROSTAT  up to max of 3 total doses. If no relief after 1 dose, call 911. ticagrelor 90 MG Tabs tablet  Commonly known as:  BRILINTA  Take 1 tablet by mouth 2 times daily        CHANGE how you take these medications    aspirin 81 MG chewable tablet  What changed:  Another medication with the same name was removed.  Continue taking dilTIAZem 90 MG extended release capsule  Commonly known as:  CARDIZEM 12 HR     ibuprofen 800 MG tablet  Commonly known as:  ADVIL;MOTRIN     naproxen 500 MG tablet  Commonly known as:  Naprosyn           Where to Get Your Medications      These medications were sent to [address], [address] [facility] [address]    Phone:  [phone]   · lisinopril 40 MG tablet  · nitroGLYCERIN 0.4 MG SL tablet  · ticagrelor 90 MG Tabs tablet         Electronically signed by Aruna Barba DO on 8/14/20 at 4:17 PM EDT

## 2020-08-14 NOTE — DISCHARGE INSTR - DIET

## 2020-08-17 LAB
PERFORMED ON: ABNORMAL
POC ACTIVATED CLOTTING TIME KAOLIN: 175 SEC (ref 82–152)
POC SAMPLE TYPE: ABNORMAL

## 2020-08-18 ENCOUNTER — HOSPITAL ENCOUNTER (EMERGENCY)
Age: 70
Discharge: HOME | End: 2020-08-18
Attending: EMERGENCY MEDICINE
Payer: MEDICARE

## 2020-08-18 ENCOUNTER — APPOINTMENT (OUTPATIENT)
Dept: GENERAL RADIOLOGY | Age: 70
End: 2020-08-18
Payer: MEDICARE

## 2020-08-18 VITALS
OXYGEN SATURATION: 98 % | RESPIRATION RATE: 14 BRPM | WEIGHT: 181 LBS | HEIGHT: 62 IN | HEART RATE: 74 BPM | TEMPERATURE: 98.8 F | DIASTOLIC BLOOD PRESSURE: 76 MMHG | BODY MASS INDEX: 33.31 KG/M2 | SYSTOLIC BLOOD PRESSURE: 157 MMHG

## 2020-08-18 LAB
ALBUMIN SERPL-MCNC: 3.9 G/DL (ref 3.5–4.6)
ALP BLD-CCNC: 50 U/L (ref 40–130)
ALT SERPL-CCNC: 16 U/L (ref 0–33)
ANION GAP SERPL CALCULATED.3IONS-SCNC: 11 MEQ/L (ref 9–15)
AST SERPL-CCNC: 18 U/L (ref 0–35)
BASOPHILS ABSOLUTE: 0 K/UL (ref 0–0.2)
BASOPHILS RELATIVE PERCENT: 0.6 %
BILIRUB SERPL-MCNC: 0.5 MG/DL (ref 0.2–0.7)
BUN BLDV-MCNC: 10 MG/DL (ref 8–23)
CALCIUM SERPL-MCNC: 8.9 MG/DL (ref 8.5–9.9)
CHLORIDE BLD-SCNC: 104 MEQ/L (ref 95–107)
CHP ED QC CHECK: YES
CO2: 24 MEQ/L (ref 20–31)
CREAT SERPL-MCNC: 0.33 MG/DL (ref 0.5–0.9)
EKG ATRIAL RATE: 81 BPM
EKG P AXIS: 64 DEGREES
EKG P-R INTERVAL: 138 MS
EKG Q-T INTERVAL: 374 MS
EKG QRS DURATION: 90 MS
EKG QTC CALCULATION (BAZETT): 434 MS
EKG R AXIS: 24 DEGREES
EKG T AXIS: 24 DEGREES
EKG VENTRICULAR RATE: 81 BPM
EOSINOPHILS ABSOLUTE: 0.1 K/UL (ref 0–0.7)
EOSINOPHILS RELATIVE PERCENT: 2 %
GFR AFRICAN AMERICAN: >60
GFR NON-AFRICAN AMERICAN: >60
GLOBULIN: 2.9 G/DL (ref 2.3–3.5)
GLUCOSE BLD-MCNC: 148 MG/DL
GLUCOSE BLD-MCNC: 148 MG/DL (ref 60–115)
GLUCOSE BLD-MCNC: 163 MG/DL (ref 70–99)
HCT VFR BLD CALC: 36.7 % (ref 37–47)
HEMOGLOBIN: 12 G/DL (ref 12–16)
LYMPHOCYTES ABSOLUTE: 1.2 K/UL (ref 1–4.8)
LYMPHOCYTES RELATIVE PERCENT: 18.8 %
MCH RBC QN AUTO: 27.6 PG (ref 27–31.3)
MCHC RBC AUTO-ENTMCNC: 32.7 % (ref 33–37)
MCV RBC AUTO: 84.3 FL (ref 82–100)
MONOCYTES ABSOLUTE: 0.5 K/UL (ref 0.2–0.8)
MONOCYTES RELATIVE PERCENT: 7.9 %
NEUTROPHILS ABSOLUTE: 4.4 K/UL (ref 1.4–6.5)
NEUTROPHILS RELATIVE PERCENT: 70.7 %
PDW BLD-RTO: 15 % (ref 11.5–14.5)
PERFORMED ON: ABNORMAL
PLATELET # BLD: 219 K/UL (ref 130–400)
POTASSIUM SERPL-SCNC: 3.8 MEQ/L (ref 3.4–4.9)
RBC # BLD: 4.35 M/UL (ref 4.2–5.4)
SODIUM BLD-SCNC: 139 MEQ/L (ref 135–144)
TOTAL PROTEIN: 6.8 G/DL (ref 6.3–8)
TROPONIN: <0.01 NG/ML (ref 0–0.01)
WBC # BLD: 6.3 K/UL (ref 4.8–10.8)

## 2020-08-18 PROCEDURE — 99285 EMERGENCY DEPT VISIT HI MDM: CPT

## 2020-08-18 PROCEDURE — 80053 COMPREHEN METABOLIC PANEL: CPT

## 2020-08-18 PROCEDURE — 84484 ASSAY OF TROPONIN QUANT: CPT

## 2020-08-18 PROCEDURE — 93005 ELECTROCARDIOGRAM TRACING: CPT | Performed by: EMERGENCY MEDICINE

## 2020-08-18 PROCEDURE — 6360000002 HC RX W HCPCS: Performed by: EMERGENCY MEDICINE

## 2020-08-18 PROCEDURE — 71045 X-RAY EXAM CHEST 1 VIEW: CPT

## 2020-08-18 PROCEDURE — 85025 COMPLETE CBC W/AUTO DIFF WBC: CPT

## 2020-08-18 PROCEDURE — 96375 TX/PRO/DX INJ NEW DRUG ADDON: CPT

## 2020-08-18 PROCEDURE — 36415 COLL VENOUS BLD VENIPUNCTURE: CPT

## 2020-08-18 PROCEDURE — 2580000003 HC RX 258: Performed by: EMERGENCY MEDICINE

## 2020-08-18 PROCEDURE — 96374 THER/PROPH/DIAG INJ IV PUSH: CPT

## 2020-08-18 RX ORDER — MORPHINE SULFATE 2 MG/ML
4 INJECTION, SOLUTION INTRAMUSCULAR; INTRAVENOUS ONCE
Status: COMPLETED | OUTPATIENT
Start: 2020-08-18 | End: 2020-08-18

## 2020-08-18 RX ORDER — ONDANSETRON 2 MG/ML
4 INJECTION INTRAMUSCULAR; INTRAVENOUS ONCE
Status: COMPLETED | OUTPATIENT
Start: 2020-08-18 | End: 2020-08-18

## 2020-08-18 RX ORDER — SODIUM CHLORIDE 0.9 % (FLUSH) 0.9 %
3 SYRINGE (ML) INJECTION EVERY 8 HOURS
Status: DISCONTINUED | OUTPATIENT
Start: 2020-08-18 | End: 2020-08-18 | Stop reason: HOSPADM

## 2020-08-18 RX ADMIN — Medication 3 ML: at 12:26

## 2020-08-18 RX ADMIN — MORPHINE SULFATE 4 MG: 2 INJECTION, SOLUTION INTRAMUSCULAR; INTRAVENOUS at 12:26

## 2020-08-18 RX ADMIN — ONDANSETRON 4 MG: 2 INJECTION INTRAMUSCULAR; INTRAVENOUS at 12:26

## 2020-08-18 ASSESSMENT — PAIN SCALES - GENERAL
PAINLEVEL_OUTOF10: 2
PAINLEVEL_OUTOF10: 5
PAINLEVEL_OUTOF10: 5

## 2020-08-18 ASSESSMENT — PAIN DESCRIPTION - LOCATION: LOCATION: HEAD

## 2020-08-18 ASSESSMENT — PAIN DESCRIPTION - PAIN TYPE
TYPE: ACUTE PAIN
TYPE: ACUTE PAIN

## 2020-08-18 ASSESSMENT — PAIN DESCRIPTION - FREQUENCY: FREQUENCY: INTERMITTENT

## 2020-08-18 ASSESSMENT — PAIN DESCRIPTION - PROGRESSION: CLINICAL_PROGRESSION: GRADUALLY WORSENING

## 2020-08-18 ASSESSMENT — PAIN DESCRIPTION - ORIENTATION: ORIENTATION: LEFT

## 2020-08-18 ASSESSMENT — PAIN DESCRIPTION - ONSET: ONSET: ON-GOING

## 2020-08-18 ASSESSMENT — PAIN DESCRIPTION - DESCRIPTORS: DESCRIPTORS: THROBBING

## 2020-08-18 NOTE — ED PROVIDER NOTES
[address] [facility] ED  eMERGENCY dEPARTMENT eNCOUnter      Pt Name: Nicholas Medrano  MRN: [id number]  [place] 1950  Date of evaluation: 8/18/2020  Provider: Stacey Sánchez MD    CHIEF COMPLAINT       Chief Complaint   Patient presents with    Chest Pain     Headpain, hypertension          HISTORY OF PRESENT ILLNESS   (Location/Symptom, Timing/Onset,Context/Setting, Quality, Duration, Modifying Factors, Severity)  Note limiting factors. Nicholas Medrano is a 71 y.o. female who presents to the emergency department who did some work, now has chest pain, midsternal worse with movement  . She had a headache earlier today but that is gone now. Her blood pressure was elevated earlier today but that is much better. She still has minimal chest pain. Duration is about 18 hours. She is never had cardiac disease but diabetic. No hypertension. HPI    NursingNotes were reviewed. REVIEW OF SYSTEMS    (2-9 systems for level 4, 10 or more for level 5)     Review of Systems     Constitutional symptoms:  no Fatigue, no fever, no chills. Skin symptoms:  Negative except as documented in HPI. ENMT symptoms:  Negative except as documented in HPI. Respiratory symptoms:  Negative except as documented in HPI. Cardiovascular symptoms:  Negative except as documented in HPI. Gastrointestinal symptoms: Negative except for documented as above in the HPI   Genitourinary symptoms:  Negative except as documented in HPI. Musculoskeletal symptoms:  Negative except as documented in HPI. Neurologic symptoms:  Negative except as documented in HPI. Remainder of 10 systems, all negative except for mentioned above      Except as noted above the remainder of the review of systems was reviewed and negative.        PAST MEDICAL HISTORY     Past Medical History:   Diagnosis Date    CAD (coronary artery disease)     Diabetes mellitus ([facility] [address]     Dyslipidemia     FH: CAD (coronary artery disease)     History of TIA (transient ischemic attack)     HTN (hypertension)     Unstable angina ([facility] [address] 12/13/12         SURGICALHISTORY       Past Surgical History:   Procedure Laterality Date    CORONARY ANGIOPLASTY  12/14/12    DIAGNOSTIC CARDIAC CATH LAB PROCEDURE  12/14/12    HYSTERECTOMY  03/2018         CURRENT MEDICATIONS       Previous Medications    ALBUTEROL SULFATE  (90 BASE) MCG/ACT INHALER    Inhale 2 puffs into the lungs every 6 hours as needed for Wheezing    AMLODIPINE (NORVASC) 10 MG TABLET    Take 10 mg by mouth daily. ASPIRIN 81 MG CHEWABLE TABLET    Take 81 mg by mouth daily    AZELASTINE (ASTELIN) 0.1 % NASAL SPRAY    1 spray by Nasal route 2 times daily Use in each nostril as directed    CAPSAICIN 0.1 % CREA    Apply 1 applicator topically 3 times daily as needed (right thigh pain)    CLONIDINE (CATAPRES) 0.2 MG TABLET    Take 1 tablet by mouth 2 times daily Do not stop this medication suddenly. Taper this medication off gradually if you want to discontinue    CYCLOBENZAPRINE (FLEXERIL) 10 MG TABLET    Take 1 tablet by mouth 3 times daily as needed for Muscle spasms    FLUTICASONE (FLONASE) 50 MCG/ACT NASAL SPRAY    2 sprays by Nasal route every 12 hours for 15 days    FUROSEMIDE (LASIX) 20 MG TABLET    Take 1 tablet by mouth 2 times daily    GLIMEPIRIDE (AMARYL) 2 MG TABLET    Take 2 mg by mouth every morning (before breakfast)     HYDROCODONE-ACETAMINOPHEN (NORCO) 7.5-325 MG PER TABLET    Take 1 tablet by mouth every 8 hours as needed for Pain . ISOSORBIDE MONONITRATE (IMDUR) 30 MG CR TABLET    Take 15 mg by mouth daily     LISINOPRIL (PRINIVIL;ZESTRIL) 40 MG TABLET    Take 1 tablet by mouth daily    LOVASTATIN (MEVACOR) 40 MG TABLET    Take 40 mg by mouth nightly. MECLIZINE (ANTIVERT) 25 MG TABLET    And every 6-8 hours as needed for dizziness    METFORMIN (GLUCOPHAGE) 500 MG TABLET    Take 500 mg by mouth 2 times daily (with meals).       METOPROLOL (LOPRESSOR) 25 MG TABLET    Take 50 mg by mouth 2 times daily     NITROGLYCERIN (NITROSTAT) 0.4 MG SL TABLET    up to max of 3 total doses. If no relief after 1 dose, call 911. OMEGA-3 FATTY ACIDS (FISH OIL) 1000 MG CAPS    Take 1,000 mg by mouth daily. OMEPRAZOLE (PRILOSEC) 20 MG CAPSULE    Take 20 mg by mouth Daily     TICAGRELOR (BRILINTA) 90 MG TABS TABLET    Take 1 tablet by mouth 2 times daily       ALLERGIES     Amoxicillin    FAMILY HISTORY       Family History   Problem Relation Age of Onset    Heart Disease Mother     Heart Disease Father     Heart Disease Sister     Heart Disease Brother           SOCIAL HISTORY       Social History     Socioeconomic History    Marital status:       Spouse name: None    Number of children: None    Years of education: None    Highest education level: None   Occupational History    None   Social Needs    Financial resource strain: None    Food insecurity     Worry: None     Inability: None    Transportation needs     Medical: None     Non-medical: None   Tobacco Use    Smoking status: Never Smoker    Smokeless tobacco: Never Used   Substance and Sexual Activity    Alcohol use: No     Alcohol/week: 0.0 standard drinks    Drug use: No    Sexual activity: None   Lifestyle    Physical activity     Days per week: None     Minutes per session: None    Stress: None   Relationships    Social connections     Talks on phone: None     Gets together: None     Attends Jain service: None     Active member of club or organization: None     Attends meetings of clubs or organizations: None     Relationship status: None    Intimate partner violence     Fear of current or ex partner: None     Emotionally abused: None     Physically abused: None     Forced sexual activity: None   Other Topics Concern    None   Social History Narrative    None       SCREENINGS      @FLOW([id number])@      PHYSICAL EXAM    (up to 7 for level 4, 8 or more for level 5)     ED Triage Vitals [08/18/20 1154]   BP Temp Temp Source Pulse Resp SpO2 Height Weight   (!) 175/87 98.8 °F (37.1 °C) Oral 78 16 97 % 5' 2\" (1.575 m) 181 lb (82.1 kg)       Physical Exam     CONST: -Well-developed well-nourished ;                -In no acute distress. -Vitals reviewed. EYES: -EOM intact, ROSE:              -Sclera normal and conjunctiva: clear bilaterally. ENT: - Normal pharynx pink and moist.  In the substernal region there is some reproducibility to palpation  NECK: -Supple (chin-to-chest). CARD: -Rate and rhythm: Regular              -Murmurs: No  RESP: -Respiratory effort and chest excursion with respirations: Normal             -Breath sounds equal bilaterally: Clear             -Wheezes: No             -Rales: No    BACK: -Flank pain: No              -Pain on palpation: No    ABD: -Distended: No           -Bruits: No           -Bowel sounds: Normal.           -Deep palpation: Non-tender           -Organomegaly palpable: No           -Abnormal masses: No    EXT: Gross appearance and use of all four extremities: Normal     SKIN: -Good turgor warm and dry. -Apparent lesions or rashes: No    NEURO: -Patient: alert                -Oriented to: person, place and time. -Appearance and judgment: appropriate.                -Cranial Nerves: Normal.                -Speech: Normal          DIAGNOSTIC RESULTS     EKG: All EKG's are interpreted by the Emergency Department Physician who either signs or Co-signsthis chart in the absence of a cardiologist.    EKG was revewed  and revealed normal sinus rhythm, rate is 70-85. no acute ST elevations,no flipped T waves , no Q waves.   [place] interval is normal.QRS duration is normal. Axes are normal. There are no PVCs    RADIOLOGY:   Non-plain filmimages such as CT, Ultrasound and MRI are read by the radiologist. Plain radiographic images are visualized and preliminarily interpreted by the emergency physician with the below findings:        Chest  x-ray fails to demonstrate acute effusion or infiltrate. There is no pneumothorax. Interpretation per the Radiologist below, if available at the time ofthis note:    Troponin is normal blood work normal.  Patient feels better, pain still reproducible, she will return for worsening weakening especially associated with fever, rest today, 1 aspirin per day and primary doctor follow-up in 2 to 4 days    XR CHEST PORTABLE    (Results Pending)         ED BEDSIDE ULTRASOUND:   Performed by ED Physician - none    LABS:  Labs Reviewed   COMPREHENSIVE METABOLIC PANEL - Abnormal; Notable for the following components:       Result Value    Glucose 163 (*)     CREATININE 0.33 (*)     All other components within normal limits   CBC WITH AUTO DIFFERENTIAL - Abnormal; Notable for the following components:    Hematocrit 36.7 (*)     MCHC 32.7 (*)     RDW 15.0 (*)     All other components within normal limits   POCT GLUCOSE - Abnormal; Notable for the following components:    POC Glucose 148 (*)     All other components within normal limits   POCT GLUCOSE - Normal   TROPONIN       All other labs were within normal range or not returned as of this dictation. EMERGENCY DEPARTMENT COURSE and DIFFERENTIAL DIAGNOSIS/MDM:   Vitals:    Vitals:    08/18/20 1154   BP: (!) 175/87   Pulse: 78   Resp: 16   Temp: 98.8 °F (37.1 °C)   TempSrc: Oral   SpO2: 97%   Weight: 181 lb (82.1 kg)   Height: 5' 2\" (1.575 m)           MDM    CRITICAL CARE TIME   Total Critical Care time was  minutes, excluding separately reportableprocedures. There was a high probability of clinicallysignificant/life threatening deterioration in the patient's condition which required my urgent intervention. CONSULTS:  None    PROCEDURES:  Unless otherwise noted below, none     Procedures    FINAL IMPRESSION      1. Chest wall pain    2.  Hypertension, unspecified type          DISPOSITION/PLAN   DISPOSITION Decision To Discharge 08/18/2020 01:15:22 PM      PATIENT REFERRED TO:  Treva Guillory MD  [address] [facility] [address]  [phone]    In 1 day  Return for weakening, worsening, chest pain, fever      DISCHARGE MEDICATIONS:  New Prescriptions    No medications on file          (Please note that portions of this note were completed with a voice recognition program.  Efforts were made to edit the dictations but occasionally words are mis-transcribed.)    Tien Harman MD (electronically signed)  Attending Emergency Physician          Tien Harman MD  08/18/20 [id number]

## 2020-08-18 NOTE — ED NOTES
EKG done and given to Dr. Amy Lynch. Pt placed on continuous cardiac monitor. Tele strip printed and mounted. Blood labeled and sent to lab. . XRay done at bedside. Pt medicated for 5/10 chest and head pain. Pt resting in bed at this time; denies any needs. Daughter returned to work; will be back later. She will be available via phone if needed.      Trevor Marte RN  08/18/20 [id number]

## 2020-08-18 NOTE — ED TRIAGE NOTES
Pt in with Chest tightness, head pain lt side 5/10 on pain scale and hypertension. Pt hx stents placed 14 aug 2020. Pt is A&Ox4, skin intact, msps intact, afebrile, breaths are equal and unlabored. Daughter at bedside.

## 2020-08-20 PROCEDURE — 93010 ELECTROCARDIOGRAM REPORT: CPT | Performed by: INTERNAL MEDICINE

## 2020-09-04 ENCOUNTER — VIRTUAL VISIT (OUTPATIENT)
Dept: CARDIOLOGY CLINIC | Age: 70
End: 2020-09-04
Payer: MEDICARE

## 2020-09-04 PROCEDURE — 4040F PNEUMOC VAC/ADMIN/RCVD: CPT | Performed by: INTERNAL MEDICINE

## 2020-09-04 PROCEDURE — 1123F ACP DISCUSS/DSCN MKR DOCD: CPT | Performed by: INTERNAL MEDICINE

## 2020-09-04 PROCEDURE — G8400 PT W/DXA NO RESULTS DOC: HCPCS | Performed by: INTERNAL MEDICINE

## 2020-09-04 PROCEDURE — 1111F DSCHRG MED/CURRENT MED MERGE: CPT | Performed by: INTERNAL MEDICINE

## 2020-09-04 PROCEDURE — 99214 OFFICE O/P EST MOD 30 MIN: CPT | Performed by: INTERNAL MEDICINE

## 2020-09-04 PROCEDURE — 3017F COLORECTAL CA SCREEN DOC REV: CPT | Performed by: INTERNAL MEDICINE

## 2020-09-04 PROCEDURE — G8417 CALC BMI ABV UP PARAM F/U: HCPCS | Performed by: INTERNAL MEDICINE

## 2020-09-04 PROCEDURE — 1090F PRES/ABSN URINE INCON ASSESS: CPT | Performed by: INTERNAL MEDICINE

## 2020-09-04 PROCEDURE — G8428 CUR MEDS NOT DOCUMENT: HCPCS | Performed by: INTERNAL MEDICINE

## 2020-09-04 PROCEDURE — 1036F TOBACCO NON-USER: CPT | Performed by: INTERNAL MEDICINE

## 2020-09-04 RX ORDER — ISOSORBIDE MONONITRATE 30 MG/1
30 TABLET, EXTENDED RELEASE ORAL DAILY
Qty: 30 TABLET | Refills: 6 | Status: SHIPPED | OUTPATIENT
Start: 2020-09-04 | End: 2020-10-16 | Stop reason: SDUPTHER

## 2020-09-04 NOTE — PROGRESS NOTES
CC: CAD    8-13-20: History of Present Illness:     This is a very pleasant 70-year-old  female with past medical history significant for coronary artery disease status post remote PCI of the LAD and circumflex, hypertension, dyslipidemia, diabetes and history of TIA who presented to the emergency room early this morning with a chief complaint of chest pain. She states that she been experiencing midsternal chest tightness and pressure yesterday evening around 8 PM while watching television. This pain radiated somewhat to her left shoulder and left arm and was unrelieved with nitroglycerin at home. She had mild associated shortness of breath and states her blood pressure was elevated. She denies nausea, vomiting, dizziness, lightheadedness, diaphoresis, palpitations, paroxysmal nocturnal dyspnea, orthopnea, syncope, fever or chills. She has had similar episodes of chest tightness but states they were never as severe. Due to her persistent pain despite nitro she presented to the ER for further evaluation.     On presentation to the emergency room, blood pressure 129/71, heart rate 63, respiratory rate 18, pulse ox of 98%, temperature of 97.7 °F.  Sodium 144, potassium 3.6, chloride 104, total CO2 25, BUN 11, creatinine 0.41, GFR greater than 60, glucose 136. proBNP 296. Initial troponin negative less than 0.010. WBC 6.1, hemoglobin 12.5, hematocrit 38.5, platelets 971. Urinalysis unremarkable. Chest x-ray showed no radiographic evidence of acute cardiopulmonary disease. EKG showed sinus rhythm with ventricular rate of 75 bpm, T wave inversion in lead III and aVF and poor R wave progression in V3 through V6,  ms. She was admitted for further evaluation.     At time of evaluation today, she is seen on 1 W. resting comfortably and in no acute distress. Serial troponins have been negative x3 at less than 0.010.   Serial vitals reviewed and blood pressure remains uncontrolled as high as 202/95 early this morning. On telemetry, she is sinus rhythm with heart rate in the 70s. She has had no ischemic evaluation or cardiac catheterization since 2012 at time of her last PCI. 9-4-20: Patient presents for initial medical evaluation. Patient is followed on a regular basis by Dr. Sy Guzman MD.  Status post hospitalization for angina type symptoms status post cardiac catheterization with PCI of the PDA/PLV branch. She is on dual antiplatelet therapy. She is feeling okay overall. No anginal symptoms now. States her blood pressure is high sometimes in the low 200s. Echo with normal LV function. Patient Active Problem List   Diagnosis    CAD (coronary artery disease)    HTN (hypertension)    Dyslipidemia    Diabetes mellitus ([address]    History of TIA (transient ischemic attack)    FH: CAD (coronary artery disease)    Stable angina [facility] [address]    Chest pain       Past Surgical History:   Procedure Laterality Date    CORONARY ANGIOPLASTY  12/14/12    DIAGNOSTIC CARDIAC CATH LAB PROCEDURE  12/14/12    HYSTERECTOMY  03/2018       Social History     Socioeconomic History    Marital status:       Spouse name: Not on file    Number of children: Not on file    Years of education: Not on file    Highest education level: Not on file   Occupational History    Not on file   Social Needs    Financial resource strain: Not on file    Food insecurity     Worry: Not on file     Inability: Not on file    Transportation needs     Medical: Not on file     Non-medical: Not on file   Tobacco Use    Smoking status: Never Smoker    Smokeless tobacco: Never Used   Substance and Sexual Activity    Alcohol use: No     Alcohol/week: 0.0 standard drinks    Drug use: No    Sexual activity: Not on file   Lifestyle    Physical activity     Days per week: Not on file     Minutes per session: Not on file    Stress: Not on file   Relationships    Social connections     Talks on phone: Not on file Gets together: Not on file     Attends [employer] service: Not on file     Active member of club or organization: Not on file     Attends meetings of clubs or organizations: Not on file     Relationship status: Not on file    Intimate partner violence     Fear of current or ex partner: Not on file     Emotionally abused: Not on file     Physically abused: Not on file     Forced sexual activity: Not on file   Other Topics Concern    Not on file   Social History Narrative    Not on file       Family History   Problem Relation Age of Onset    Heart Disease Mother     Heart Disease Father     Heart Disease Sister     Heart Disease Brother        Current Outpatient Medications   Medication Sig Dispense Refill    isosorbide mononitrate (IMDUR) 30 MG extended release tablet Take 1 tablet by mouth daily 30 tablet 6    nitroGLYCERIN (NITROSTAT) 0.4 MG SL tablet up to max of 3 total doses. If no relief after 1 dose, call 911. 25 tablet 3    lisinopril (PRINIVIL;ZESTRIL) 40 MG tablet Take 1 tablet by mouth daily 30 tablet 3    ticagrelor (BRILINTA) 90 MG TABS tablet Take 1 tablet by mouth 2 times daily 60 tablet 6    cloNIDine (CATAPRES) 0.2 MG tablet Take 1 tablet by mouth 2 times daily Do not stop this medication suddenly.   Taper this medication off gradually if you want to discontinue 60 tablet 0    meclizine (ANTIVERT) 25 MG tablet And every 6-8 hours as needed for dizziness 20 tablet 0    furosemide (LASIX) 20 MG tablet Take 1 tablet by mouth 2 times daily 4 tablet 0    azelastine (ASTELIN) 0.1 % nasal spray 1 spray by Nasal route 2 times daily Use in each nostril as directed 1 Bottle 3    albuterol sulfate  (90 Base) MCG/ACT inhaler Inhale 2 puffs into the lungs every 6 hours as needed for Wheezing 1 Inhaler 0    cyclobenzaprine (FLEXERIL) 10 MG tablet Take 1 tablet by mouth 3 times daily as needed for Muscle spasms 12 tablet 0    Capsaicin 0.1 % CREA Apply 1 applicator topically 3 times daily as needed (right thigh pain) 56.6 g 0    aspirin 81 MG chewable tablet Take 81 mg by mouth daily      HYDROcodone-acetaminophen (NORCO) 7.5-325 MG per tablet Take 1 tablet by mouth every 8 hours as needed for Pain . 12 tablet 0    fluticasone (FLONASE) 50 MCG/ACT nasal spray 2 sprays by Nasal route every 12 hours for 15 days 1 Bottle 0    glimepiride (AMARYL) 2 MG tablet Take 2 mg by mouth every morning (before breakfast)   2    omeprazole (PRILOSEC) 20 MG capsule Take 20 mg by mouth Daily   3    metoprolol (LOPRESSOR) 25 MG tablet Take 50 mg by mouth 2 times daily       amLODIPine (NORVASC) 10 MG tablet Take 10 mg by mouth daily.  lovastatin (MEVACOR) 40 MG tablet Take 40 mg by mouth nightly.  metformin (GLUCOPHAGE) 500 MG tablet Take 500 mg by mouth 2 times daily (with meals).  Omega-3 Fatty Acids (FISH OIL) 1000 MG CAPS Take 1,000 mg by mouth daily. No current facility-administered medications for this visit. Amoxicillin    Review of Systems:  General ROS: negative  Psychological ROS: negative  Hematological and Lymphatic ROS: No history of blood clots or bleeding disorder. Respiratory ROS: no cough, shortness of breath, or wheezing  Cardiovascular ROS: no chest pain or dyspnea on exertion  Gastrointestinal ROS: no abdominal pain, change in bowel habits, or black or bloody stools  Genito-Urinary ROS: no dysuria, trouble voiding, or hematuria  Musculoskeletal ROS: negative  Neurological ROS: no TIA or stroke symptoms  Dermatological ROS: negative    VITALS:  not currently breastfeeding. There is no height or weight on file to calculate BMI. Physical Examination:  General appearance - alert, well appearing, and in no distress  Mental status - alert, oriented to person, place, and time  Neck - Neck is supple, no JVD or carotid bruits. No thyromegaly or adenopathy.    Chest - clear to auscultation, no wheezes, rales or rhonchi, symmetric air entry  Heart - normal rate, regular rhythm, normal S1, S2, no murmurs, rubs, clicks or gallops  Abdomen - soft, nontender, nondistended, no masses or organomegaly  Neurological - alert, oriented, normal speech, no focal findings or movement disorder noted  Extremities - peripheral pulses normal, no pedal edema, no clubbing or cyanosis  Skin - normal coloration and turgor, no rashes, no suspicious skin lesions noted        No orders of the defined types were placed in this encounter. ASSESSMENT:     Diagnosis Orders   1. Coronary artery disease involving native coronary artery of native heart without angina pectoris     2. Unstable angina pectoris ([facility] [address]     3. Dyslipidemia     4. Essential hypertension     5. Stable angina [facility])           PLAN:     Patient will need to continue to follow up with you for their general medical care    As always, aggressive risk factor modification is strongly recommended. We should adhere to the JNC VIII guidelines for HTN management and the NCEP ATP III guidelines for LDL-C management. Cardiac diet is always recommended with low fat, cholesterol, calories and sodium. Continue medications at current doses. Start Imdur 30mg daily. Continue with dual antiplatelet therapy     Patient was advised and encouraged to check blood pressure at home or at a pharmacy, maintain a logbook, and also call us back if blood pressure are above the target ranges or if it is low. Patient clearly understands and agrees to the instructions. We will need to continue to monitor muscle and liver enzymes, BUN, CR, and electrolytes. Details of medical condition explained and patient was warned about adverse consequences of uncontrolled medical conditions and possible side effects of prescribed medications.      Call us if BP is still >140/90

## 2020-09-14 ENCOUNTER — TELEPHONE (OUTPATIENT)
Dept: CARDIOLOGY CLINIC | Age: 70
End: 2020-09-14

## 2020-09-14 NOTE — TELEPHONE ENCOUNTER
Spoke with pt daughter states her BP has been consistently high and today was 208/104. Extreme BLE edema but is relieved somewhat with rest. Was not feeling well this morning and was relieved with Nitro x1.  VV scheduled for tomorrow AM. Advised ER with worsening sx

## 2020-09-14 NOTE — TELEPHONE ENCOUNTER
----- Message from Cheryl Mcdonnell MA sent at 9/8/2020 10:09 AM EDT -----  Regarding: Grace Lopez BP  Per DR Jethro Morelos, call pt in 1 wee.  If BP stil >140/90 increase imdur to 60mg daily

## 2020-09-15 ENCOUNTER — VIRTUAL VISIT (OUTPATIENT)
Dept: CARDIOLOGY CLINIC | Age: 70
End: 2020-09-15
Payer: MEDICARE

## 2020-09-15 PROCEDURE — 3017F COLORECTAL CA SCREEN DOC REV: CPT | Performed by: INTERNAL MEDICINE

## 2020-09-15 PROCEDURE — 1090F PRES/ABSN URINE INCON ASSESS: CPT | Performed by: INTERNAL MEDICINE

## 2020-09-15 PROCEDURE — G8428 CUR MEDS NOT DOCUMENT: HCPCS | Performed by: INTERNAL MEDICINE

## 2020-09-15 PROCEDURE — G8417 CALC BMI ABV UP PARAM F/U: HCPCS | Performed by: INTERNAL MEDICINE

## 2020-09-15 PROCEDURE — 99214 OFFICE O/P EST MOD 30 MIN: CPT | Performed by: INTERNAL MEDICINE

## 2020-09-15 PROCEDURE — G8400 PT W/DXA NO RESULTS DOC: HCPCS | Performed by: INTERNAL MEDICINE

## 2020-09-15 PROCEDURE — 1036F TOBACCO NON-USER: CPT | Performed by: INTERNAL MEDICINE

## 2020-09-15 PROCEDURE — 1123F ACP DISCUSS/DSCN MKR DOCD: CPT | Performed by: INTERNAL MEDICINE

## 2020-09-15 PROCEDURE — 4040F PNEUMOC VAC/ADMIN/RCVD: CPT | Performed by: INTERNAL MEDICINE

## 2020-09-15 NOTE — PROGRESS NOTES
CC: CAD    8-13-20: History of Present Illness:     This is a very pleasant 70-year-old  female with past medical history significant for coronary artery disease status post remote PCI of the LAD and circumflex, hypertension, dyslipidemia, diabetes and history of TIA who presented to the emergency room early this morning with a chief complaint of chest pain. She states that she been experiencing midsternal chest tightness and pressure yesterday evening around 8 PM while watching television. This pain radiated somewhat to her left shoulder and left arm and was unrelieved with nitroglycerin at home. She had mild associated shortness of breath and states her blood pressure was elevated. She denies nausea, vomiting, dizziness, lightheadedness, diaphoresis, palpitations, paroxysmal nocturnal dyspnea, orthopnea, syncope, fever or chills. She has had similar episodes of chest tightness but states they were never as severe. Due to her persistent pain despite nitro she presented to the ER for further evaluation.     On presentation to the emergency room, blood pressure 129/71, heart rate 63, respiratory rate 18, pulse ox of 98%, temperature of 97.7 °F.  Sodium 144, potassium 3.6, chloride 104, total CO2 25, BUN 11, creatinine 0.41, GFR greater than 60, glucose 136. proBNP 296. Initial troponin negative less than 0.010. WBC 6.1, hemoglobin 12.5, hematocrit 38.5, platelets 579. Urinalysis unremarkable. Chest x-ray showed no radiographic evidence of acute cardiopulmonary disease. EKG showed sinus rhythm with ventricular rate of 75 bpm, T wave inversion in lead III and aVF and poor R wave progression in V3 through V6,  ms. She was admitted for further evaluation.     At time of evaluation today, she is seen on 1 W. resting comfortably and in no acute distress. Serial troponins have been negative x3 at less than 0.010.   Serial vitals reviewed and blood pressure remains uncontrolled as high as 202/95 early this morning. On telemetry, she is sinus rhythm with heart rate in the 70s. She has had no ischemic evaluation or cardiac catheterization since 2012 at time of her last PCI. 9-4-20: Patient presents for initial medical evaluation. Patient is followed on a regular basis by Dr. Nicola Palomo MD.  Status post hospitalization for angina type symptoms status post cardiac catheterization with PCI of the PDA/PLV branch. She is on dual antiplatelet therapy. She is feeling okay overall. No anginal symptoms now. States her blood pressure is high sometimes in the low 200s. Echo with normal LV function. 9- 15 2020: States she was having chest pain her blood pressure was high a few days ago. States her blood pressure is out of control and is as high as 208 at times. She states she is compliant with her medications. Spoke with the nurse position on call yesterday and had her blood pressure medications adjusted. Her Imdur was increased to 60 mg daily. She has lower extremity edema as well. She is on Norvasc. Her diet can be better. 3 of CAD status post PCI of the PDA/PLV branch in August 2020. Patient Active Problem List   Diagnosis    CAD (coronary artery disease)    HTN (hypertension)    Dyslipidemia    Diabetes mellitus ([address]    History of TIA (transient ischemic attack)    FH: CAD (coronary artery disease)    Stable angina ([address]    Chest pain       Past Surgical History:   Procedure Laterality Date    CORONARY ANGIOPLASTY  12/14/12    DIAGNOSTIC CARDIAC CATH LAB PROCEDURE  12/14/12    HYSTERECTOMY  03/2018       Social History     Socioeconomic History    Marital status:       Spouse name: Not on file    Number of children: Not on file    Years of education: Not on file    Highest education level: Not on file   Occupational History    Not on file   Social Needs    Financial resource strain: Not on file    Food insecurity     Worry: Not on file     Inability: Not on file   [employer] needs     Medical: Not on file     Non-medical: Not on file   Tobacco Use    Smoking status: Never Smoker    Smokeless tobacco: Never Used   Substance and Sexual Activity    Alcohol use: No     Alcohol/week: 0.0 standard drinks    Drug use: No    Sexual activity: Not on file   Lifestyle    Physical activity     Days per week: Not on file     Minutes per session: Not on file    Stress: Not on file   Relationships    Social connections     Talks on phone: Not on file     Gets together: Not on file     Attends [employer] service: Not on file     Active member of club or organization: Not on file     Attends meetings of clubs or organizations: Not on file     Relationship status: Not on file    Intimate partner violence     Fear of current or ex partner: Not on file     Emotionally abused: Not on file     Physically abused: Not on file     Forced sexual activity: Not on file   Other Topics Concern    Not on file   Social History Narrative    Not on file       Family History   Problem Relation Age of Onset    Heart Disease Mother     Heart Disease Father     Heart Disease Sister     Heart Disease Brother        Current Outpatient Medications   Medication Sig Dispense Refill    isosorbide mononitrate (IMDUR) 30 MG extended release tablet Take 1 tablet by mouth daily 30 tablet 6    nitroGLYCERIN (NITROSTAT) 0.4 MG SL tablet up to max of 3 total doses. If no relief after 1 dose, call 911. 25 tablet 3    lisinopril (PRINIVIL;ZESTRIL) 40 MG tablet Take 1 tablet by mouth daily 30 tablet 3    ticagrelor (BRILINTA) 90 MG TABS tablet Take 1 tablet by mouth 2 times daily 60 tablet 6    cloNIDine (CATAPRES) 0.2 MG tablet Take 1 tablet by mouth 2 times daily Do not stop this medication suddenly.   Taper this medication off gradually if you want to discontinue 60 tablet 0    meclizine (ANTIVERT) 25 MG tablet And every 6-8 hours as needed for dizziness 20 tablet 0    furosemide (LASIX) 20 MG tablet Take 1 tablet by mouth 2 times daily 4 tablet 0    azelastine (ASTELIN) 0.1 % nasal spray 1 spray by Nasal route 2 times daily Use in each nostril as directed 1 Bottle 3    albuterol sulfate  (90 Base) MCG/ACT inhaler Inhale 2 puffs into the lungs every 6 hours as needed for Wheezing 1 Inhaler 0    cyclobenzaprine (FLEXERIL) 10 MG tablet Take 1 tablet by mouth 3 times daily as needed for Muscle spasms 12 tablet 0    Capsaicin 0.1 % CREA Apply 1 applicator topically 3 times daily as needed (right thigh pain) 56.6 g 0    aspirin 81 MG chewable tablet Take 81 mg by mouth daily      HYDROcodone-acetaminophen (NORCO) 7.5-325 MG per tablet Take 1 tablet by mouth every 8 hours as needed for Pain . 12 tablet 0    fluticasone (FLONASE) 50 MCG/ACT nasal spray 2 sprays by Nasal route every 12 hours for 15 days 1 Bottle 0    glimepiride (AMARYL) 2 MG tablet Take 2 mg by mouth every morning (before breakfast)   2    omeprazole (PRILOSEC) 20 MG capsule Take 20 mg by mouth Daily   3    metoprolol (LOPRESSOR) 25 MG tablet Take 50 mg by mouth 2 times daily       amLODIPine (NORVASC) 10 MG tablet Take 10 mg by mouth daily.  lovastatin (MEVACOR) 40 MG tablet Take 40 mg by mouth nightly.  metformin (GLUCOPHAGE) 500 MG tablet Take 500 mg by mouth 2 times daily (with meals).  Omega-3 Fatty Acids (FISH OIL) 1000 MG CAPS Take 1,000 mg by mouth daily. No current facility-administered medications for this visit. Amoxicillin    Review of Systems:  General ROS: negative  Psychological ROS: negative  Hematological and Lymphatic ROS: No history of blood clots or bleeding disorder.    Respiratory ROS: no cough, shortness of breath, or wheezing  Cardiovascular ROS: no chest pain or dyspnea on exertion  Gastrointestinal ROS: no abdominal pain, change in bowel habits, or black or bloody stools  Genito-Urinary ROS: no dysuria, trouble voiding, or hematuria  Musculoskeletal ROS: negative  Neurological ROS: no TIA or stroke symptoms  Dermatological ROS: negative    VITALS:  not currently breastfeeding. There is no height or weight on file to calculate BMI. Physical Examination:  General appearance - alert, well appearing, and in no distress  Mental status - alert, oriented to person, place, and time  Neck - Neck is supple, no JVD or carotid bruits. No thyromegaly or adenopathy. Chest - clear to auscultation, no wheezes, rales or rhonchi, symmetric air entry  Heart - normal rate, regular rhythm, normal S1, S2, no murmurs, rubs, clicks or gallops  Abdomen - soft, nontender, nondistended, no masses or organomegaly  Neurological - alert, oriented, normal speech, no focal findings or movement disorder noted  Extremities - peripheral pulses normal, no pedal edema, no clubbing or cyanosis  Skin - normal coloration and turgor, no rashes, no suspicious skin lesions noted        No orders of the defined types were placed in this encounter. ASSESSMENT:     Diagnosis Orders   1. Coronary artery disease involving native coronary artery of native heart without angina pectoris     2. Dyslipidemia     3. Stable angina pectoris ([address]     4. Essential hypertension           PLAN:     Patient will need to continue to follow up with you for their general medical care    As always, aggressive risk factor modification is strongly recommended. We should adhere to the JNC VIII guidelines for HTN management and the NCEP ATP III guidelines for LDL-C management. Cardiac diet is always recommended with low fat, cholesterol, calories and sodium. Continue medications at current doses. Continue with Imdur 60 mg daily    Low-sodium diet. Blood pressure checks in the office. Consider holding Norvasc if lower extremity edema is worsened.     Continue with dual antiplatelet therapy     Patient was advised and encouraged to check blood pressure at home or at a pharmacy, maintain a logbook, and also call us back if blood pressure are above the target ranges or if it is low. Patient clearly understands and agrees to the instructions. We will need to continue to monitor muscle and liver enzymes, BUN, CR, and electrolytes. Details of medical condition explained and patient was warned about adverse consequences of uncontrolled medical conditions and possible side effects of prescribed medications.      Call us if BP is still >140/90

## 2020-09-16 ENCOUNTER — TELEPHONE (OUTPATIENT)
Dept: CARDIOLOGY CLINIC | Age: 70
End: 2020-09-16

## 2020-09-16 ENCOUNTER — NURSE ONLY (OUTPATIENT)
Dept: CARDIOLOGY CLINIC | Age: 70
End: 2020-09-16

## 2020-09-16 VITALS — SYSTOLIC BLOOD PRESSURE: 132 MMHG | DIASTOLIC BLOOD PRESSURE: 68 MMHG

## 2020-10-16 ENCOUNTER — OFFICE VISIT (OUTPATIENT)
Dept: CARDIOLOGY CLINIC | Age: 70
End: 2020-10-16
Payer: MEDICARE

## 2020-10-16 VITALS
DIASTOLIC BLOOD PRESSURE: 80 MMHG | BODY MASS INDEX: 31.83 KG/M2 | HEART RATE: 90 BPM | OXYGEN SATURATION: 96 % | WEIGHT: 174 LBS | SYSTOLIC BLOOD PRESSURE: 130 MMHG

## 2020-10-16 PROBLEM — G47.33 OSA (OBSTRUCTIVE SLEEP APNEA): Status: ACTIVE | Noted: 2020-10-16

## 2020-10-16 PROCEDURE — G8427 DOCREV CUR MEDS BY ELIG CLIN: HCPCS | Performed by: INTERNAL MEDICINE

## 2020-10-16 PROCEDURE — 1036F TOBACCO NON-USER: CPT | Performed by: INTERNAL MEDICINE

## 2020-10-16 PROCEDURE — 3017F COLORECTAL CA SCREEN DOC REV: CPT | Performed by: INTERNAL MEDICINE

## 2020-10-16 PROCEDURE — 1090F PRES/ABSN URINE INCON ASSESS: CPT | Performed by: INTERNAL MEDICINE

## 2020-10-16 PROCEDURE — G8400 PT W/DXA NO RESULTS DOC: HCPCS | Performed by: INTERNAL MEDICINE

## 2020-10-16 PROCEDURE — 99214 OFFICE O/P EST MOD 30 MIN: CPT | Performed by: INTERNAL MEDICINE

## 2020-10-16 PROCEDURE — 4040F PNEUMOC VAC/ADMIN/RCVD: CPT | Performed by: INTERNAL MEDICINE

## 2020-10-16 PROCEDURE — G8417 CALC BMI ABV UP PARAM F/U: HCPCS | Performed by: INTERNAL MEDICINE

## 2020-10-16 PROCEDURE — 1123F ACP DISCUSS/DSCN MKR DOCD: CPT | Performed by: INTERNAL MEDICINE

## 2020-10-16 PROCEDURE — G8484 FLU IMMUNIZE NO ADMIN: HCPCS | Performed by: INTERNAL MEDICINE

## 2020-10-16 RX ORDER — ISOSORBIDE MONONITRATE 60 MG/1
60 TABLET, EXTENDED RELEASE ORAL DAILY
Qty: 30 TABLET | Refills: 6 | Status: SHIPPED | OUTPATIENT
Start: 2020-10-16

## 2020-10-16 RX ORDER — CLOPIDOGREL BISULFATE 75 MG/1
75 TABLET ORAL DAILY
Qty: 30 TABLET | Refills: 6 | Status: SHIPPED | OUTPATIENT
Start: 2020-10-16 | End: 2021-05-24 | Stop reason: SDUPTHER

## 2020-10-16 NOTE — PROGRESS NOTES
CC: CAD    8-13-20: History of Present Illness:     This is a very pleasant 40-year-old  female with past medical history significant for coronary artery disease status post remote PCI of the LAD and circumflex, hypertension, dyslipidemia, diabetes and history of TIA who presented to the emergency room early this morning with a chief complaint of chest pain. She states that she been experiencing midsternal chest tightness and pressure yesterday evening around 8 PM while watching television. This pain radiated somewhat to her left shoulder and left arm and was unrelieved with nitroglycerin at home. She had mild associated shortness of breath and states her blood pressure was elevated. She denies nausea, vomiting, dizziness, lightheadedness, diaphoresis, palpitations, paroxysmal nocturnal dyspnea, orthopnea, syncope, fever or chills. She has had similar episodes of chest tightness but states they were never as severe. Due to her persistent pain despite nitro she presented to the ER for further evaluation.     On presentation to the emergency room, blood pressure 129/71, heart rate 63, respiratory rate 18, pulse ox of 98%, temperature of 97.7 °F.  Sodium 144, potassium 3.6, chloride 104, total CO2 25, BUN 11, creatinine 0.41, GFR greater than 60, glucose 136. proBNP 296. Initial troponin negative less than 0.010. WBC 6.1, hemoglobin 12.5, hematocrit 38.5, platelets 838. Urinalysis unremarkable. Chest x-ray showed no radiographic evidence of acute cardiopulmonary disease. EKG showed sinus rhythm with ventricular rate of 75 bpm, T wave inversion in lead III and aVF and poor R wave progression in V3 through V6,  ms. She was admitted for further evaluation.     At time of evaluation today, she is seen on 1 W. resting comfortably and in no acute distress. Serial troponins have been negative x3 at less than 0.010.   Serial vitals reviewed and blood pressure remains uncontrolled as high as 202/95 ischemic attack)    FH: CAD (coronary artery disease)    Stable angina (HCC)    Chest pain    EVITA (obstructive sleep apnea)       Past Surgical History:   Procedure Laterality Date    CORONARY ANGIOPLASTY  12/14/12    DIAGNOSTIC CARDIAC CATH LAB PROCEDURE  12/14/12    HYSTERECTOMY  03/2018       Social History     Socioeconomic History    Marital status:       Spouse name: Not on file    Number of children: Not on file    Years of education: Not on file    Highest education level: Not on file   Occupational History    Not on file   Social Needs    Financial resource strain: Not on file    Food insecurity     Worry: Not on file     Inability: Not on file    Transportation needs     Medical: Not on file     Non-medical: Not on file   Tobacco Use    Smoking status: Never Smoker    Smokeless tobacco: Never Used   Substance and Sexual Activity    Alcohol use: No     Alcohol/week: 0.0 standard drinks    Drug use: No    Sexual activity: Not on file   Lifestyle    Physical activity     Days per week: Not on file     Minutes per session: Not on file    Stress: Not on file   Relationships    Social connections     Talks on phone: Not on file     Gets together: Not on file     Attends Nondenominational service: Not on file     Active member of club or organization: Not on file     Attends meetings of clubs or organizations: Not on file     Relationship status: Not on file    Intimate partner violence     Fear of current or ex partner: Not on file     Emotionally abused: Not on file     Physically abused: Not on file     Forced sexual activity: Not on file   Other Topics Concern    Not on file   Social History Narrative    Not on file       Family History   Problem Relation Age of Onset    Heart Disease Mother     Heart Disease Father     Heart Disease Sister     Heart Disease Brother        Current Outpatient Medications   Medication Sig Dispense Refill    isosorbide mononitrate (IMDUR) 60 MG extended release tablet Take 1 tablet by mouth daily 30 tablet 6    nitroGLYCERIN (NITROSTAT) 0.4 MG SL tablet up to max of 3 total doses. If no relief after 1 dose, call 911. 25 tablet 3    lisinopril (PRINIVIL;ZESTRIL) 40 MG tablet Take 1 tablet by mouth daily 30 tablet 3    ticagrelor (BRILINTA) 90 MG TABS tablet Take 1 tablet by mouth 2 times daily 60 tablet 6    cloNIDine (CATAPRES) 0.2 MG tablet Take 1 tablet by mouth 2 times daily Do not stop this medication suddenly. Taper this medication off gradually if you want to discontinue 60 tablet 0    meclizine (ANTIVERT) 25 MG tablet And every 6-8 hours as needed for dizziness 20 tablet 0    furosemide (LASIX) 20 MG tablet Take 1 tablet by mouth 2 times daily 4 tablet 0    azelastine (ASTELIN) 0.1 % nasal spray 1 spray by Nasal route 2 times daily Use in each nostril as directed 1 Bottle 3    albuterol sulfate  (90 Base) MCG/ACT inhaler Inhale 2 puffs into the lungs every 6 hours as needed for Wheezing 1 Inhaler 0    cyclobenzaprine (FLEXERIL) 10 MG tablet Take 1 tablet by mouth 3 times daily as needed for Muscle spasms 12 tablet 0    Capsaicin 0.1 % CREA Apply 1 applicator topically 3 times daily as needed (right thigh pain) 56.6 g 0    aspirin 81 MG chewable tablet Take 81 mg by mouth daily      HYDROcodone-acetaminophen (NORCO) 7.5-325 MG per tablet Take 1 tablet by mouth every 8 hours as needed for Pain . 12 tablet 0    glimepiride (AMARYL) 2 MG tablet Take 2 mg by mouth every morning (before breakfast)   2    omeprazole (PRILOSEC) 20 MG capsule Take 20 mg by mouth Daily   3    metoprolol (LOPRESSOR) 25 MG tablet Take 50 mg by mouth 2 times daily       amLODIPine (NORVASC) 10 MG tablet Take 10 mg by mouth daily.  lovastatin (MEVACOR) 40 MG tablet Take 40 mg by mouth nightly.  metformin (GLUCOPHAGE) 500 MG tablet Take 500 mg by mouth 2 times daily (with meals).         Omega-3 Fatty Acids (FISH OIL) 1000 MG CAPS Take 1,000 mg by mouth daily.  fluticasone (FLONASE) 50 MCG/ACT nasal spray 2 sprays by Nasal route every 12 hours for 15 days 1 Bottle 0     No current facility-administered medications for this visit. Amoxicillin    Review of Systems:  General ROS: negative  Psychological ROS: negative  Hematological and Lymphatic ROS: No history of blood clots or bleeding disorder. Respiratory ROS: no cough, shortness of breath, or wheezing  Cardiovascular ROS: no chest pain or dyspnea on exertion  Gastrointestinal ROS: no abdominal pain, change in bowel habits, or black or bloody stools  Genito-Urinary ROS: no dysuria, trouble voiding, or hematuria  Musculoskeletal ROS: negative  Neurological ROS: no TIA or stroke symptoms  Dermatological ROS: negative    VITALS:  Blood pressure 130/80, pulse 90, weight 174 lb (78.9 kg), SpO2 96 %, not currently breastfeeding. Body mass index is 31.83 kg/m². Physical Examination:  General appearance - alert, well appearing, and in no distress  Mental status - alert, oriented to person, place, and time  Neck - Neck is supple, no JVD or carotid bruits. No thyromegaly or adenopathy. Chest - clear to auscultation, no wheezes, rales or rhonchi, symmetric air entry  Heart - normal rate, regular rhythm, normal S1, S2, no murmurs, rubs, clicks or gallops  Abdomen - soft, nontender, nondistended, no masses or organomegaly  Neurological - alert, oriented, normal speech, no focal findings or movement disorder noted  Extremities - peripheral pulses normal, no pedal edema, no clubbing or cyanosis  Skin - normal coloration and turgor, no rashes, no suspicious skin lesions noted        No orders of the defined types were placed in this encounter. ASSESSMENT:     Diagnosis Orders   1. Coronary artery disease involving native coronary artery of native heart without angina pectoris     2. Stable angina pectoris ([address]     3. Dyslipidemia     4. Essential hypertension     5.  Stable angina (HCC) 6. EVITA (obstructive sleep apnea)       6. Preoperative clearance    PLAN:     Patient will need to continue to follow up with you for their general medical care    As always, aggressive risk factor modification is strongly recommended. We should adhere to the JNC VIII guidelines for HTN management and the NCEP ATP III guidelines for LDL-C management. Cardiac diet is always recommended with low fat, cholesterol, calories and sodium. Continue medications at current doses. Patient is considered high risk for surgery. Okay to hold Plavix for 5 days  3 months post PCI, ideally would like to wait 6 months. Continue with aspirin throughout surgery if able. Change Brilinta to Plavix due to cost of medication. Increase Imdur to  60mg daily    Consider Ranexa. Low-sodium diet. Blood pressure checks in the office. Consider holding Norvasc if lower extremity edema is worsened. Continue with dual antiplatelet therapy     Patient was advised and encouraged to check blood pressure at home or at a pharmacy, maintain a logbook, and also call us back if blood pressure are above the target ranges or if it is low. Patient clearly understands and agrees to the instructions. We will need to continue to monitor muscle and liver enzymes, BUN, CR, and electrolytes. Details of medical condition explained and patient was warned about adverse consequences of uncontrolled medical conditions and possible side effects of prescribed medications.      Call us if BP is still >140/90

## 2021-05-24 RX ORDER — CLOPIDOGREL BISULFATE 75 MG/1
75 TABLET ORAL DAILY
Qty: 30 TABLET | Refills: 0 | Status: SHIPPED | OUTPATIENT
Start: 2021-05-24

## 2021-06-03 ENCOUNTER — APPOINTMENT (OUTPATIENT)
Dept: GENERAL RADIOLOGY | Age: 71
End: 2021-06-03
Payer: MEDICARE

## 2021-06-03 ENCOUNTER — HOSPITAL ENCOUNTER (OUTPATIENT)
Age: 71
Setting detail: OBSERVATION
Discharge: HOME | End: 2021-06-04
Attending: STUDENT IN AN ORGANIZED HEALTH CARE EDUCATION/TRAINING PROGRAM | Admitting: INTERNAL MEDICINE
Payer: MEDICARE

## 2021-06-03 DIAGNOSIS — Z86.79 HISTORY OF CORONARY ARTERY DISEASE: ICD-10-CM

## 2021-06-03 DIAGNOSIS — E83.42 HYPOMAGNESEMIA: ICD-10-CM

## 2021-06-03 DIAGNOSIS — I20.8 ANGINAL CHEST PAIN AT REST (HCC): Primary | ICD-10-CM

## 2021-06-03 DIAGNOSIS — I10 CHRONIC HYPERTENSION: ICD-10-CM

## 2021-06-03 DIAGNOSIS — E11.65 TYPE 2 DIABETES MELLITUS WITH HYPERGLYCEMIA, UNSPECIFIED WHETHER LONG TERM INSULIN USE (HCC): ICD-10-CM

## 2021-06-03 PROBLEM — I20.89 ANGINAL CHEST PAIN AT REST: Status: ACTIVE | Noted: 2021-06-03

## 2021-06-03 LAB
ALBUMIN SERPL-MCNC: 4.3 G/DL (ref 3.5–4.6)
ALP BLD-CCNC: 47 U/L (ref 40–130)
ALT SERPL-CCNC: 11 U/L (ref 0–33)
ANION GAP SERPL CALCULATED.3IONS-SCNC: 13 MEQ/L (ref 9–15)
ANISOCYTOSIS: ABNORMAL
APTT: 24.6 SEC (ref 24.4–36.8)
AST SERPL-CCNC: 13 U/L (ref 0–35)
BASOPHILS ABSOLUTE: 0 K/UL (ref 0–0.2)
BASOPHILS RELATIVE PERCENT: 0.7 %
BILIRUB SERPL-MCNC: 0.3 MG/DL (ref 0.2–0.7)
BUN BLDV-MCNC: 12 MG/DL (ref 8–23)
C-REACTIVE PROTEIN, HIGH SENSITIVITY: 2.3 MG/L (ref 0–5)
CALCIUM SERPL-MCNC: 9.1 MG/DL (ref 8.5–9.9)
CHLORIDE BLD-SCNC: 101 MEQ/L (ref 95–107)
CO2: 27 MEQ/L (ref 20–31)
CREAT SERPL-MCNC: 0.4 MG/DL (ref 0.5–0.9)
EKG ATRIAL RATE: 73 BPM
EKG P AXIS: 48 DEGREES
EKG P-R INTERVAL: 140 MS
EKG Q-T INTERVAL: 374 MS
EKG QRS DURATION: 92 MS
EKG QTC CALCULATION (BAZETT): 412 MS
EKG R AXIS: -1 DEGREES
EKG T AXIS: 21 DEGREES
EKG VENTRICULAR RATE: 73 BPM
EOSINOPHILS ABSOLUTE: 0.1 K/UL (ref 0–0.7)
EOSINOPHILS RELATIVE PERCENT: 2 %
GFR AFRICAN AMERICAN: >60
GFR NON-AFRICAN AMERICAN: >60
GLOBULIN: 2.8 G/DL (ref 2.3–3.5)
GLUCOSE BLD-MCNC: 118 MG/DL (ref 70–99)
GLUCOSE BLD-MCNC: 120 MG/DL (ref 60–115)
GLUCOSE BLD-MCNC: 175 MG/DL (ref 60–115)
HCT VFR BLD CALC: 31.3 % (ref 37–47)
HEMOGLOBIN: 9.6 G/DL (ref 12–16)
HYPOCHROMIA: ABNORMAL
INR BLD: 1
LYMPHOCYTES ABSOLUTE: 1.7 K/UL (ref 1–4.8)
LYMPHOCYTES RELATIVE PERCENT: 27.1 %
MAGNESIUM: 1.3 MG/DL (ref 1.7–2.4)
MCH RBC QN AUTO: 22.1 PG (ref 27–31.3)
MCHC RBC AUTO-ENTMCNC: 30.6 % (ref 33–37)
MCV RBC AUTO: 72.3 FL (ref 82–100)
MICROCYTES: ABNORMAL
MONOCYTES ABSOLUTE: 0.4 K/UL (ref 0.2–0.8)
MONOCYTES RELATIVE PERCENT: 6.9 %
NEUTROPHILS ABSOLUTE: 4 K/UL (ref 1.4–6.5)
NEUTROPHILS RELATIVE PERCENT: 63.3 %
PDW BLD-RTO: 17.9 % (ref 11.5–14.5)
PERFORMED ON: ABNORMAL
PERFORMED ON: ABNORMAL
PLATELET # BLD: 248 K/UL (ref 130–400)
POIKILOCYTES: ABNORMAL
POTASSIUM SERPL-SCNC: 4.1 MEQ/L (ref 3.4–4.9)
PRO-BNP: 399 PG/ML
PROTHROMBIN TIME: 12.8 SEC (ref 12.3–14.9)
RBC # BLD: 4.33 M/UL (ref 4.2–5.4)
SODIUM BLD-SCNC: 141 MEQ/L (ref 135–144)
TOTAL CK: 50 U/L (ref 0–170)
TOTAL PROTEIN: 7.1 G/DL (ref 6.3–8)
TROPONIN: <0.01 NG/ML (ref 0–0.01)
TROPONIN: <0.01 NG/ML (ref 0–0.01)
TSH SERPL DL<=0.05 MIU/L-ACNC: 0.35 UIU/ML (ref 0.44–3.86)
WBC # BLD: 6.4 K/UL (ref 4.8–10.8)

## 2021-06-03 PROCEDURE — 93010 ELECTROCARDIOGRAM REPORT: CPT | Performed by: INTERNAL MEDICINE

## 2021-06-03 PROCEDURE — 84443 ASSAY THYROID STIM HORMONE: CPT

## 2021-06-03 PROCEDURE — G0378 HOSPITAL OBSERVATION PER HR: HCPCS

## 2021-06-03 PROCEDURE — 84484 ASSAY OF TROPONIN QUANT: CPT

## 2021-06-03 PROCEDURE — 99285 EMERGENCY DEPT VISIT HI MDM: CPT

## 2021-06-03 PROCEDURE — 6370000000 HC RX 637 (ALT 250 FOR IP): Performed by: STUDENT IN AN ORGANIZED HEALTH CARE EDUCATION/TRAINING PROGRAM

## 2021-06-03 PROCEDURE — 36415 COLL VENOUS BLD VENIPUNCTURE: CPT

## 2021-06-03 PROCEDURE — 99220 PR INITIAL OBSERVATION CARE/DAY 70 MINUTES: CPT | Performed by: INTERNAL MEDICINE

## 2021-06-03 PROCEDURE — 83880 ASSAY OF NATRIURETIC PEPTIDE: CPT

## 2021-06-03 PROCEDURE — 93005 ELECTROCARDIOGRAM TRACING: CPT | Performed by: STUDENT IN AN ORGANIZED HEALTH CARE EDUCATION/TRAINING PROGRAM

## 2021-06-03 PROCEDURE — 85730 THROMBOPLASTIN TIME PARTIAL: CPT

## 2021-06-03 PROCEDURE — 86141 C-REACTIVE PROTEIN HS: CPT

## 2021-06-03 PROCEDURE — 96372 THER/PROPH/DIAG INJ SC/IM: CPT

## 2021-06-03 PROCEDURE — 6360000002 HC RX W HCPCS: Performed by: STUDENT IN AN ORGANIZED HEALTH CARE EDUCATION/TRAINING PROGRAM

## 2021-06-03 PROCEDURE — 6370000000 HC RX 637 (ALT 250 FOR IP): Performed by: INTERNAL MEDICINE

## 2021-06-03 PROCEDURE — 83735 ASSAY OF MAGNESIUM: CPT

## 2021-06-03 PROCEDURE — 80053 COMPREHEN METABOLIC PANEL: CPT

## 2021-06-03 PROCEDURE — 82550 ASSAY OF CK (CPK): CPT

## 2021-06-03 PROCEDURE — 85025 COMPLETE CBC W/AUTO DIFF WBC: CPT

## 2021-06-03 PROCEDURE — 94664 DEMO&/EVAL PT USE INHALER: CPT

## 2021-06-03 PROCEDURE — 96365 THER/PROPH/DIAG IV INF INIT: CPT

## 2021-06-03 PROCEDURE — 2580000003 HC RX 258: Performed by: INTERNAL MEDICINE

## 2021-06-03 PROCEDURE — 85610 PROTHROMBIN TIME: CPT

## 2021-06-03 PROCEDURE — 71045 X-RAY EXAM CHEST 1 VIEW: CPT

## 2021-06-03 PROCEDURE — 96366 THER/PROPH/DIAG IV INF ADDON: CPT

## 2021-06-03 RX ORDER — METOPROLOL TARTRATE 50 MG/1
50 TABLET, FILM COATED ORAL ONCE
Status: COMPLETED | OUTPATIENT
Start: 2021-06-03 | End: 2021-06-03

## 2021-06-03 RX ORDER — FUROSEMIDE 20 MG/1
20 TABLET ORAL DAILY
Status: DISCONTINUED | OUTPATIENT
Start: 2021-06-03 | End: 2021-06-04 | Stop reason: HOSPADM

## 2021-06-03 RX ORDER — ACETAMINOPHEN 650 MG/1
650 SUPPOSITORY RECTAL EVERY 6 HOURS PRN
Status: DISCONTINUED | OUTPATIENT
Start: 2021-06-03 | End: 2021-06-04 | Stop reason: HOSPADM

## 2021-06-03 RX ORDER — CLOPIDOGREL BISULFATE 75 MG/1
75 TABLET ORAL DAILY
Status: DISCONTINUED | OUTPATIENT
Start: 2021-06-03 | End: 2021-06-04 | Stop reason: HOSPADM

## 2021-06-03 RX ORDER — CLONIDINE HYDROCHLORIDE 0.1 MG/1
0.2 TABLET ORAL 2 TIMES DAILY
Status: DISCONTINUED | OUTPATIENT
Start: 2021-06-03 | End: 2021-06-04 | Stop reason: HOSPADM

## 2021-06-03 RX ORDER — SODIUM CHLORIDE 0.9 % (FLUSH) 0.9 %
5-40 SYRINGE (ML) INJECTION EVERY 12 HOURS SCHEDULED
Status: DISCONTINUED | OUTPATIENT
Start: 2021-06-03 | End: 2021-06-04 | Stop reason: HOSPADM

## 2021-06-03 RX ORDER — HYDROCODONE BITARTRATE AND ACETAMINOPHEN 5; 325 MG/1; MG/1
1 TABLET ORAL EVERY 6 HOURS PRN
Status: DISCONTINUED | OUTPATIENT
Start: 2021-06-03 | End: 2021-06-04 | Stop reason: HOSPADM

## 2021-06-03 RX ORDER — METOPROLOL TARTRATE 100 MG/1
100 TABLET ORAL 2 TIMES DAILY
Status: ON HOLD | COMMUNITY
End: 2021-06-04 | Stop reason: HOSPADM

## 2021-06-03 RX ORDER — NITROGLYCERIN 0.4 MG/1
0.4 TABLET SUBLINGUAL EVERY 5 MIN PRN
Status: DISCONTINUED | OUTPATIENT
Start: 2021-06-03 | End: 2021-06-04 | Stop reason: HOSPADM

## 2021-06-03 RX ORDER — ASPIRIN 81 MG/1
81 TABLET, CHEWABLE ORAL DAILY
Status: DISCONTINUED | OUTPATIENT
Start: 2021-06-04 | End: 2021-06-04 | Stop reason: HOSPADM

## 2021-06-03 RX ORDER — FERROUS SULFATE 325(65) MG
325 TABLET ORAL 2 TIMES DAILY
COMMUNITY

## 2021-06-03 RX ORDER — ATORVASTATIN CALCIUM 80 MG/1
80 TABLET, FILM COATED ORAL NIGHTLY
Status: DISCONTINUED | OUTPATIENT
Start: 2021-06-03 | End: 2021-06-04 | Stop reason: HOSPADM

## 2021-06-03 RX ORDER — SODIUM CHLORIDE 9 MG/ML
25 INJECTION, SOLUTION INTRAVENOUS PRN
Status: DISCONTINUED | OUTPATIENT
Start: 2021-06-03 | End: 2021-06-04 | Stop reason: HOSPADM

## 2021-06-03 RX ORDER — MAGNESIUM SULFATE 1 G/100ML
1000 INJECTION INTRAVENOUS ONCE
Status: COMPLETED | OUTPATIENT
Start: 2021-06-03 | End: 2021-06-03

## 2021-06-03 RX ORDER — ONDANSETRON 4 MG/1
4 TABLET, ORALLY DISINTEGRATING ORAL EVERY 8 HOURS PRN
Status: DISCONTINUED | OUTPATIENT
Start: 2021-06-03 | End: 2021-06-04 | Stop reason: HOSPADM

## 2021-06-03 RX ORDER — SODIUM CHLORIDE 0.9 % (FLUSH) 0.9 %
5-40 SYRINGE (ML) INJECTION PRN
Status: DISCONTINUED | OUTPATIENT
Start: 2021-06-03 | End: 2021-06-04 | Stop reason: HOSPADM

## 2021-06-03 RX ORDER — ISOSORBIDE MONONITRATE 30 MG/1
30 TABLET, EXTENDED RELEASE ORAL DAILY
Status: DISCONTINUED | OUTPATIENT
Start: 2021-06-03 | End: 2021-06-04 | Stop reason: HOSPADM

## 2021-06-03 RX ORDER — GLIPIZIDE 5 MG/1
5 TABLET ORAL
Status: DISCONTINUED | OUTPATIENT
Start: 2021-06-04 | End: 2021-06-04 | Stop reason: HOSPADM

## 2021-06-03 RX ORDER — ALBUTEROL SULFATE 90 UG/1
2 AEROSOL, METERED RESPIRATORY (INHALATION) EVERY 6 HOURS PRN
Status: DISCONTINUED | OUTPATIENT
Start: 2021-06-03 | End: 2021-06-04 | Stop reason: HOSPADM

## 2021-06-03 RX ORDER — POLYETHYLENE GLYCOL 3350 17 G/17G
17 POWDER, FOR SOLUTION ORAL DAILY PRN
Status: DISCONTINUED | OUTPATIENT
Start: 2021-06-03 | End: 2021-06-04 | Stop reason: HOSPADM

## 2021-06-03 RX ORDER — PANTOPRAZOLE SODIUM 40 MG/1
40 TABLET, DELAYED RELEASE ORAL
Status: DISCONTINUED | OUTPATIENT
Start: 2021-06-04 | End: 2021-06-04 | Stop reason: HOSPADM

## 2021-06-03 RX ORDER — ALPRAZOLAM 0.25 MG/1
0.25 TABLET ORAL NIGHTLY PRN
COMMUNITY

## 2021-06-03 RX ORDER — ACETAMINOPHEN 325 MG/1
650 TABLET ORAL EVERY 6 HOURS PRN
Status: DISCONTINUED | OUTPATIENT
Start: 2021-06-03 | End: 2021-06-04 | Stop reason: HOSPADM

## 2021-06-03 RX ORDER — ASPIRIN 81 MG/1
324 TABLET, CHEWABLE ORAL ONCE
Status: COMPLETED | OUTPATIENT
Start: 2021-06-03 | End: 2021-06-03

## 2021-06-03 RX ORDER — ONDANSETRON 2 MG/ML
4 INJECTION INTRAMUSCULAR; INTRAVENOUS EVERY 6 HOURS PRN
Status: DISCONTINUED | OUTPATIENT
Start: 2021-06-03 | End: 2021-06-04 | Stop reason: HOSPADM

## 2021-06-03 RX ORDER — NITROGLYCERIN 0.4 MG/1
0.4 TABLET SUBLINGUAL EVERY 5 MIN PRN
Status: DISCONTINUED | OUTPATIENT
Start: 2021-06-03 | End: 2021-06-03 | Stop reason: SDUPTHER

## 2021-06-03 RX ORDER — HYDRALAZINE HYDROCHLORIDE 20 MG/ML
10 INJECTION INTRAMUSCULAR; INTRAVENOUS EVERY 6 HOURS PRN
Status: DISCONTINUED | OUTPATIENT
Start: 2021-06-03 | End: 2021-06-04 | Stop reason: HOSPADM

## 2021-06-03 RX ORDER — LISINOPRIL 10 MG/1
40 TABLET ORAL DAILY
Status: DISCONTINUED | OUTPATIENT
Start: 2021-06-03 | End: 2021-06-04 | Stop reason: HOSPADM

## 2021-06-03 RX ORDER — THIAMINE HCL 100 MG
2500 TABLET ORAL DAILY
COMMUNITY

## 2021-06-03 RX ORDER — FLUTICASONE PROPIONATE 50 MCG
1 SPRAY, SUSPENSION (ML) NASAL DAILY
Status: DISCONTINUED | OUTPATIENT
Start: 2021-06-03 | End: 2021-06-04 | Stop reason: HOSPADM

## 2021-06-03 RX ORDER — ATORVASTATIN CALCIUM 20 MG/1
20 TABLET, FILM COATED ORAL DAILY
COMMUNITY

## 2021-06-03 RX ADMIN — FUROSEMIDE 20 MG: 20 TABLET ORAL at 18:43

## 2021-06-03 RX ADMIN — SODIUM CHLORIDE, PRESERVATIVE FREE 10 ML: 5 INJECTION INTRAVENOUS at 21:09

## 2021-06-03 RX ADMIN — MAGNESIUM SULFATE HEPTAHYDRATE 1000 MG: 1 INJECTION, SOLUTION INTRAVENOUS at 15:37

## 2021-06-03 RX ADMIN — NITROGLYCERIN 0.4 MG: 0.4 TABLET, ORALLY DISINTEGRATING SUBLINGUAL at 14:45

## 2021-06-03 RX ADMIN — NITROGLYCERIN 0.4 MG: 0.4 TABLET, ORALLY DISINTEGRATING SUBLINGUAL at 14:58

## 2021-06-03 RX ADMIN — METOPROLOL TARTRATE 50 MG: 50 TABLET, FILM COATED ORAL at 16:30

## 2021-06-03 RX ADMIN — METOPROLOL TARTRATE 25 MG: 25 TABLET, FILM COATED ORAL at 21:08

## 2021-06-03 RX ADMIN — ATORVASTATIN CALCIUM 80 MG: 80 TABLET, FILM COATED ORAL at 21:08

## 2021-06-03 RX ADMIN — LISINOPRIL 40 MG: 10 TABLET ORAL at 18:44

## 2021-06-03 RX ADMIN — Medication 650 MG: at 21:08

## 2021-06-03 RX ADMIN — CLONIDINE HYDROCHLORIDE 0.2 MG: 0.1 TABLET ORAL at 21:08

## 2021-06-03 RX ADMIN — NITROGLYCERIN 0.4 MG: 0.4 TABLET, ORALLY DISINTEGRATING SUBLINGUAL at 14:23

## 2021-06-03 RX ADMIN — ASPIRIN 324 MG: 81 TABLET, CHEWABLE ORAL at 14:23

## 2021-06-03 RX ADMIN — ENOXAPARIN SODIUM 80 MG: 80 INJECTION SUBCUTANEOUS at 16:31

## 2021-06-03 ASSESSMENT — ENCOUNTER SYMPTOMS
SINUS PRESSURE: 0
BLOOD IN STOOL: 0
COUGH: 0
SHORTNESS OF BREATH: 0
DIARRHEA: 0
GASTROINTESTINAL NEGATIVE: 1
TROUBLE SWALLOWING: 0
ABDOMINAL PAIN: 0
RESPIRATORY NEGATIVE: 1
WHEEZING: 0
STRIDOR: 0
EYES NEGATIVE: 1
CHEST TIGHTNESS: 0
NAUSEA: 0
BACK PAIN: 0
VOMITING: 0

## 2021-06-03 ASSESSMENT — PAIN DESCRIPTION - ORIENTATION
ORIENTATION: LEFT
ORIENTATION: LEFT;UPPER

## 2021-06-03 ASSESSMENT — PAIN - FUNCTIONAL ASSESSMENT: PAIN_FUNCTIONAL_ASSESSMENT: ACTIVITIES ARE NOT PREVENTED

## 2021-06-03 ASSESSMENT — HEART SCORE: ECG: 0

## 2021-06-03 ASSESSMENT — PAIN DESCRIPTION - DESCRIPTORS: DESCRIPTORS: BURNING;ACHING

## 2021-06-03 ASSESSMENT — PAIN SCALES - GENERAL
PAINLEVEL_OUTOF10: 8
PAINLEVEL_OUTOF10: 4
PAINLEVEL_OUTOF10: 3
PAINLEVEL_OUTOF10: 2
PAINLEVEL_OUTOF10: 0
PAINLEVEL_OUTOF10: 5

## 2021-06-03 ASSESSMENT — PAIN DESCRIPTION - FREQUENCY: FREQUENCY: CONTINUOUS

## 2021-06-03 ASSESSMENT — PAIN DESCRIPTION - ONSET: ONSET: SUDDEN

## 2021-06-03 ASSESSMENT — PAIN DESCRIPTION - PAIN TYPE: TYPE: ACUTE PAIN

## 2021-06-03 ASSESSMENT — PAIN DESCRIPTION - LOCATION
LOCATION: ARM
LOCATION: CHEST

## 2021-06-03 NOTE — ED NOTES
Pt states her CP dropped from 5 to 3 on scale of 10. Pt did require using the restroom after the first nitro which may have caused her BP to rise.      Darlene Lawrence RN  06/03/21 [id number]

## 2021-06-03 NOTE — PROGRESS NOTES
[facility] Respiratory Therapy Evaluation   Current Order:  Albuterol mdi q6 prn      Home Regimen: none      Ordering Physician: deacon  Re-evaluation Date:  eval done     Diagnosis: chest pain      Patient Status: Stable / Unstable + Physician notified    The following MDI Criteria must be met in order to convert aerosol to MDI with spacer. If unable to meet, MDI will be converted to aerosol:  []  Patient able to demonstrate the ability to use MDI effectively  []  Patient alert and cooperative  []  Patient able to take deep breath with 5-10 second hold  []  Medication(s) available in this delivery method   []  Peak flow greater than or equal to 200 ml/min            Current Order Substituted To  (same drug, same frequency)   Aerosol to MDI [] Albuterol Sulfate 0.083% unit dose by aerosol Albuterol Sulfate MDI 2 puffs by inhalation with spacer    [] Levalbuterol 1.25 mg unit dose by aerosol Levalbuterol MDI 2 puffs by inhalation with spacer    [] Levalbuterol 0.63 mg unit dose by aerosol Levalbuterol MDI 2 puffs by inhalation with spacer    [] Ipratropium Bromide 0.02% unit dose by aerosol Ipratropium Bromide MDI 2 puffs by inhalation with spacer    [] Duoneb (Ipratropium + Albuterol) unit dose by aerosol Ipratropium MDI + Albuterol MDI 2 puffs by inhalation w/spacer   MDI to Aerosol [] Albuterol Sulfate MDI Albuterol Sulfate 0.083% unit dose by aerosol    [] Levalbuterol MDI 2 puffs by inhalation Levalbuterol 1.25 mg unit dose by aerosol    [] Ipratropium Bromide MDI by inhalation Ipratropium Bromide 0.02% unit dose by aerosol    [] Combivent (Ipratropium + Albuterol) MDI by inhalation Duoneb (Ipratropium + Albuterol) unit dose by aerosol       Treatment Assessment [Frequency/Schedule]:  Change frequency to: _________no change_________________________________________per Protocol, P&T, [facility]      Points 0 1 2 3 4   Pulmonary Status  Non-Smoker  [x]   Smoking history   < 20 pack years  []   Smoking history  ?  20 pack years  []   Pulmonary Disorder  (acute or chronic)  []   Severe or Chronic w/ Exacerbation  []     Surgical Status No [x]   Surgeries     General []   Surgery Lower []   Abdominal Thoracic or []   Upper Abdominal Thoracic with  PulmonaryDisorder  []     Chest X-ray Clear/Not  Ordered     [x]  Chronic Changes  Results Pending  []  Infiltrates, atelectasis, pleural effusion, or edema  []  Infiltrates in more than one lobe []  Infiltrate + Atelectasis, &/or pleural effusion  []    Respiratory Pattern Regular,  RR = 12-20 [x]  Increased,  RR = 21-25 []  BROWER, irregular,  or RR = 26-30 []  Decreased FEV1  or RR = 31-35 []  Severe SOB, use  of accessory muscles, or RR ? 35  []    Mental Status Alert, oriented,  Cooperative [x]  Confused but Follows commands []  Lethargic or unable to follow commands []  Obtunded  []  Comatose  []    Breath Sounds Clear to  auscultation  [x]  Decreased unilaterally or  in bases only []  Decreased  bilaterally  []  Crackles or intermittent wheezes []  Wheezes []    Cough Strong, Spontan., & nonproductive [x]  Strong,  spontaneous, &  productive []  Weak,  Nonproductive []  Weak, productive or  with wheezes []  No spontaneous  cough or may require suctioning []    Level of Activity Ambulatory []  Ambulatory w/ Assist  [x]  Non-ambulatory []  Paraplegic []  Quadriplegic []    Total    Score:___1___     Triage Score:____5____      Tri       Triage:     1. (>20) Freq: Q3    2. (16-20) Freq: Q4   3. (11-15) Freq: QID & Albuterol Q2 PRN    4. (6-10) Freq: TID & Albuterol Q2 PRN    5. (0-5) Freq Q4prn

## 2021-06-03 NOTE — H&P
History and Physical  Patient: Justin Batter  Unit/Bed: 19/19  YOB: 1950  MRN: [id number]  Acct: [de-identified]   Admitting Diagnosis: Anginal chest pain at rest [facility]) [I20.8]  Admit Date:  6/3/2021  Hospital Day: 0      Chief Complaint: CP      History of Present Illness: for 3 days continuous CP substernal radiating to left ar. Not related to exertion. Sometime sharp. Has been compliant with meds. No SOB. Received NTG in ER. Thinks she hasd some relief but not sure. Mag 1.3 -- iv Mag given will recheck in AM.     Intial Trop Negative. EGS no acute injury    8/2020 VIGNESH x2 Distal RCA  Former smoker    EKG:  PMHx:  Past Medical History:   Diagnosis Date    CAD (coronary artery disease)     Diabetes mellitus [facility] [address]     Dyslipidemia     FH: CAD (coronary artery disease)     History of TIA (transient ischemic attack)     HTN (hypertension)     EVITA (obstructive sleep apnea) 10/16/2020    Unstable angina ([facility] [address] 12/13/12       PSHx:  Past Surgical History:   Procedure Laterality Date    CORONARY ANGIOPLASTY  12/14/12    DIAGNOSTIC CARDIAC CATH LAB PROCEDURE  12/14/12    HYSTERECTOMY  03/2018       Social Hx:  Social History     Socioeconomic History    Marital status:       Spouse name: None    Number of children: None    Years of education: None    Highest education level: None   Occupational History    None   Tobacco Use    Smoking status: Never Smoker    Smokeless tobacco: Never Used   Vaping Use    Vaping Use: Never used   Substance and Sexual Activity    Alcohol use: No     Alcohol/week: 0.0 standard drinks    Drug use: No    Sexual activity: None   Other Topics Concern    None   Social History Narrative    None     Social Determinants of Health     Financial Resource Strain:     Difficulty of Paying Living Expenses:    Food Insecurity:     Worried About Running Out of Food in the Last Year:     Shay of Food in the Last Year:    Transportation Needs:     Lack of Transportation (Medical):      Lack of Transportation (Non-Medical):    Physical Activity:     Days of Exercise per Week:     Minutes of Exercise per Session:    Stress:     Feeling of Stress :    Social Connections:     Frequency of Communication with Friends and Family:     Frequency of Social Gatherings with Friends and Family:     Attends Sikh Services:     Active Member of Clubs or Organizations:     Attends Club or Organization Meetings:     Marital Status:    Intimate Partner Violence:     Fear of Current or Ex-Partner:     Emotionally Abused:     Physically Abused:     Sexually Abused:        Family Hx:  Family History   Problem Relation Age of Onset    Heart Disease Mother     Heart Disease Father     Heart Disease Sister     Heart Disease Brother        Allergies   Allergen Reactions    Amoxicillin        Current Facility-Administered Medications   Medication Dose Route Frequency Provider Last Rate Last Admin    nitroGLYCERIN (NITROSTAT) SL tablet 0.4 mg  0.4 mg Sublingual Q5 Min PRN Mohan Grossman DO   0.4 mg at 06/03/21 1458    clopidogrel (PLAVIX) tablet 75 mg  75 mg Oral Daily Caterina Em MD        isosorbide mononitrate (IMDUR) extended release tablet 30 mg  30 mg Oral Daily Caterina Em MD        lisinopril (PRINIVIL;ZESTRIL) tablet 40 mg  40 mg Oral Daily Caterina Em MD        cloNIDine (CATAPRES) tablet 0.2 mg  0.2 mg Oral BID Caterina Em MD        furosemide (LASIX) tablet 20 mg  20 mg Oral Daily Caterina Em MD        albuterol sulfate  (90 Base) MCG/ACT inhaler 2 puff  2 puff Inhalation Q6H PRN Caterina Em MD        HYDROcodone-acetaminophen (NORCO) 5-325 MG per tablet 1 tablet  1 tablet Oral Q6H PRN Caterina Em MD        fluticasone (FLONASE) 50 MCG/ACT nasal spray 1 spray  1 spray Each Nostril Daily Caterina Em MD        Eddye Adas ON 6/4/2021] glipiZIDE (GLUCOTROL) tablet 5 mg  5 mg Oral QAM AC Caterina Em MD        [START ON 6/4/2021] pantoprazole (PROTONIX) tablet 40 mg  40 mg Oral QAM AC Yanet Peña MD        metoprolol tartrate (LOPRESSOR) tablet 25 mg  25 mg Oral BID Yanet Peña MD        insulin lispro (HUMALOG) injection vial 0-12 Units  0-12 Units Subcutaneous TID WC Yanet Peña MD        insulin lispro (HUMALOG) injection vial 0-6 Units  0-6 Units Subcutaneous Nightly Yanet Peña MD         Current Outpatient Medications   Medication Sig Dispense Refill    clopidogrel (PLAVIX) 75 MG tablet Take 1 tablet by mouth daily 30 tablet 0    isosorbide mononitrate (IMDUR) 60 MG extended release tablet Take 1 tablet by mouth daily 30 tablet 6    nitroGLYCERIN (NITROSTAT) 0.4 MG SL tablet up to max of 3 total doses. If no relief after 1 dose, call 911. 25 tablet 3    lisinopril (PRINIVIL;ZESTRIL) 40 MG tablet Take 1 tablet by mouth daily 30 tablet 3    cloNIDine (CATAPRES) 0.2 MG tablet Take 1 tablet by mouth 2 times daily Do not stop this medication suddenly. Taper this medication off gradually if you want to discontinue 60 tablet 0    meclizine (ANTIVERT) 25 MG tablet And every 6-8 hours as needed for dizziness 20 tablet 0    furosemide (LASIX) 20 MG tablet Take 1 tablet by mouth 2 times daily 4 tablet 0    azelastine (ASTELIN) 0.1 % nasal spray 1 spray by Nasal route 2 times daily Use in each nostril as directed 1 Bottle 3    albuterol sulfate  (90 Base) MCG/ACT inhaler Inhale 2 puffs into the lungs every 6 hours as needed for Wheezing 1 Inhaler 0    cyclobenzaprine (FLEXERIL) 10 MG tablet Take 1 tablet by mouth 3 times daily as needed for Muscle spasms 12 tablet 0    Capsaicin 0.1 % CREA Apply 1 applicator topically 3 times daily as needed (right thigh pain) 56.6 g 0    aspirin 81 MG chewable tablet Take 81 mg by mouth daily      HYDROcodone-acetaminophen (NORCO) 7.5-325 MG per tablet Take 1 tablet by mouth every 8 hours as needed for Pain .  12 tablet 0    glimepiride (AMARYL) 2 MG tablet Take 2 mg by mouth every morning (before breakfast)   2    omeprazole (PRILOSEC) 20 MG capsule Take 20 mg by mouth Daily   3    metoprolol (LOPRESSOR) 25 MG tablet Take 50 mg by mouth 2 times daily       amLODIPine (NORVASC) 10 MG tablet Take 10 mg by mouth daily.  lovastatin (MEVACOR) 40 MG tablet Take 40 mg by mouth nightly.  metformin (GLUCOPHAGE) 500 MG tablet Take 500 mg by mouth 2 times daily (with meals).  Omega-3 Fatty Acids (FISH OIL) 1000 MG CAPS Take 1,000 mg by mouth daily.  fluticasone (FLONASE) 50 MCG/ACT nasal spray 2 sprays by Nasal route every 12 hours for 15 days 1 Bottle 0       Review of Systems:   Review of Systems   Constitutional: Negative. Negative for diaphoresis and fatigue. HENT: Negative. Eyes: Negative. Respiratory: Negative. Negative for cough, chest tightness, shortness of breath, wheezing and stridor. Cardiovascular: Positive for chest pain. Negative for palpitations and leg swelling. Gastrointestinal: Negative. Negative for blood in stool and nausea. Genitourinary: Negative. Musculoskeletal: Negative. Skin: Negative. Neurological: Negative. Negative for dizziness, syncope, weakness and light-headedness. Hematological: Negative. Psychiatric/Behavioral: Negative. Physical Examination:    BP (!) 175/90   Pulse 73   Temp 97.8 °F (36.6 °C) (Temporal)   Resp 16   Ht 5' 2\" (1.575 m)   Wt 172 lb (78 kg)   SpO2 99%   BMI 31.46 kg/m²    Physical Exam   Constitutional: She appears healthy. No distress. HENT:   Normal cephalic and Atraumatic   Eyes: Pupils are equal, round, and reactive to light. Neck: Thyroid normal. No JVD present. No neck adenopathy. No thyromegaly present. Cardiovascular: Normal rate, regular rhythm, normal heart sounds, intact distal pulses and normal pulses. Pulmonary/Chest: Effort normal and breath sounds normal. She has no wheezes. She has no rales. She exhibits no tenderness. Abdominal: Soft.  Bowel sounds are normal. There is no abdominal tenderness. Musculoskeletal:         General: No tenderness or edema. Normal range of motion. Cervical back: Normal range of motion and neck supple. Neurological: She is alert and oriented to person, place, and time. Skin: Skin is warm. No cyanosis. Nails show no clubbing.          LABS:  CBC:   Lab Results   Component Value Date    WBC 6.4 06/03/2021    RBC 4.33 06/03/2021    RBC 4.64 03/22/2012    HGB 9.6 06/03/2021    HCT 31.3 06/03/2021    MCV 72.3 06/03/2021    MCH 22.1 06/03/2021    MCHC 30.6 06/03/2021    RDW 17.9 06/03/2021     06/03/2021    MPV 8.5 05/05/2014     CBC with Differential:    Lab Results   Component Value Date    WBC 6.4 06/03/2021    RBC 4.33 06/03/2021    RBC 4.64 03/22/2012    HGB 9.6 06/03/2021    HCT 31.3 06/03/2021     06/03/2021    MCV 72.3 06/03/2021    MCH 22.1 06/03/2021    MCHC 30.6 06/03/2021    RDW 17.9 06/03/2021    LYMPHOPCT 27.1 06/03/2021    MONOPCT 6.9 06/03/2021    EOSPCT 3.5 03/22/2012    BASOPCT 0.7 06/03/2021    MONOSABS 0.4 06/03/2021    LYMPHSABS 1.7 06/03/2021    EOSABS 0.1 06/03/2021    BASOSABS 0.0 06/03/2021     CMP:    Lab Results   Component Value Date     06/03/2021    K 4.1 06/03/2021    K 3.6 08/13/2020     06/03/2021    CO2 27 06/03/2021    BUN 12 06/03/2021    CREATININE 0.40 06/03/2021    GFRAA >60.0 06/03/2021    LABGLOM >60.0 06/03/2021    GLUCOSE 118 06/03/2021    GLUCOSE 102 03/22/2012    PROT 7.1 06/03/2021    LABALBU 4.3 06/03/2021    LABALBU 4.2 03/22/2012    CALCIUM 9.1 06/03/2021    BILITOT 0.3 06/03/2021    ALKPHOS 47 06/03/2021    AST 13 06/03/2021    ALT 11 06/03/2021     BMP:    Lab Results   Component Value Date     06/03/2021    K 4.1 06/03/2021    K 3.6 08/13/2020     06/03/2021    CO2 27 06/03/2021    BUN 12 06/03/2021    LABALBU 4.3 06/03/2021    LABALBU 4.2 03/22/2012    CREATININE 0.40 06/03/2021    CALCIUM 9.1 06/03/2021    GFRAA >60.0 06/03/2021    LABGLOM

## 2021-06-03 NOTE — ED PROVIDER NOTES
[facility] ED  eMERGENCY dEPARTMENT eNCOUnter      Pt Name: Angela Friedman  MRN: [id number]  [place] 1950  Date of evaluation: 6/3/2021  Provider: Andrew Grant, [address] [place] [address]       Chief Complaint   Patient presents with    Arm Pain     lue pain for the last 3 days          HISTORY OF PRESENT ILLNESS   (Location/Symptom, Timing/Onset,Context/Setting, Quality, Duration, Modifying Factors, Severity)  Note limiting factors. Angela Friedman is a 79 y.o. female who presents to the emergency department complaint of left upper extremity pain going to her left neck, left jaw and left upper chest comes and goes and waxes and wanes over the last 3 days. Pain is not worse with any movement. Patient denies any injury or heavy lifting. Patient states that the pain is dull and achy almost heavy like something is sitting on her. Patient denies vomiting. Patient denies any diaphoresis. Patient denies any fever, chills, cough, loss of smell or loss of taste. Patient's had similar pain before in the past.  She states in August she had to have a heart catheterization. Patient denies any modifying factors making the pain any better. She states that it is simply there. The history is provided by the patient. NursingNotes were reviewed. REVIEW OF SYSTEMS    (2-9 systems for level 4, 10 or more for level 5)     Review of Systems   Constitutional: Negative for activity change, appetite change, chills, fever and unexpected weight change. HENT: Negative for drooling, ear pain, nosebleeds, sinus pressure and trouble swallowing. Respiratory: Negative for cough, chest tightness and shortness of breath. Cardiovascular: Positive for chest pain. Negative for leg swelling. Gastrointestinal: Negative for abdominal pain, diarrhea and vomiting. Endocrine: Negative for polydipsia and polyphagia. Genitourinary: Negative for dysuria, flank pain and frequency.    Musculoskeletal: Negative for back pain and myalgias. Skin: Negative for pallor and rash. Neurological: Negative for syncope, weakness and headaches. Hematological: Does not bruise/bleed easily. All other systems reviewed and are negative. Except as noted above the remainder of the review of systems was reviewed and negative. PAST MEDICAL HISTORY     Past Medical History:   Diagnosis Date    CAD (coronary artery disease)     Diabetes mellitus ([facility] [address]     Dyslipidemia     FH: CAD (coronary artery disease)     History of TIA (transient ischemic attack)     HTN (hypertension)     EVITA (obstructive sleep apnea) 10/16/2020    Unstable angina ([facility] [address] 12/13/12         SURGICALHISTORY       Past Surgical History:   Procedure Laterality Date    CORONARY ANGIOPLASTY  12/14/12    DIAGNOSTIC CARDIAC CATH LAB PROCEDURE  12/14/12    HYSTERECTOMY  03/2018         CURRENT MEDICATIONS       Previous Medications    ALBUTEROL SULFATE  (90 BASE) MCG/ACT INHALER    Inhale 2 puffs into the lungs every 6 hours as needed for Wheezing    AMLODIPINE (NORVASC) 10 MG TABLET    Take 10 mg by mouth daily. ASPIRIN 81 MG CHEWABLE TABLET    Take 81 mg by mouth daily    AZELASTINE (ASTELIN) 0.1 % NASAL SPRAY    1 spray by Nasal route 2 times daily Use in each nostril as directed    CAPSAICIN 0.1 % CREA    Apply 1 applicator topically 3 times daily as needed (right thigh pain)    CLONIDINE (CATAPRES) 0.2 MG TABLET    Take 1 tablet by mouth 2 times daily Do not stop this medication suddenly.   Taper this medication off gradually if you want to discontinue    CLOPIDOGREL (PLAVIX) 75 MG TABLET    Take 1 tablet by mouth daily    CYCLOBENZAPRINE (FLEXERIL) 10 MG TABLET    Take 1 tablet by mouth 3 times daily as needed for Muscle spasms    FLUTICASONE (FLONASE) 50 MCG/ACT NASAL SPRAY    2 sprays by Nasal route every 12 hours for 15 days    FUROSEMIDE (LASIX) 20 MG TABLET    Take 1 tablet by mouth 2 times daily    GLIMEPIRIDE (AMARYL) 2 MG TABLET    Take 2 mg by mouth every morning (before breakfast)     HYDROCODONE-ACETAMINOPHEN (NORCO) 7.5-325 MG PER TABLET    Take 1 tablet by mouth every 8 hours as needed for Pain . ISOSORBIDE MONONITRATE (IMDUR) 60 MG EXTENDED RELEASE TABLET    Take 1 tablet by mouth daily    LISINOPRIL (PRINIVIL;ZESTRIL) 40 MG TABLET    Take 1 tablet by mouth daily    LOVASTATIN (MEVACOR) 40 MG TABLET    Take 40 mg by mouth nightly. MECLIZINE (ANTIVERT) 25 MG TABLET    And every 6-8 hours as needed for dizziness    METFORMIN (GLUCOPHAGE) 500 MG TABLET    Take 500 mg by mouth 2 times daily (with meals). METOPROLOL (LOPRESSOR) 25 MG TABLET    Take 50 mg by mouth 2 times daily     NITROGLYCERIN (NITROSTAT) 0.4 MG SL TABLET    up to max of 3 total doses. If no relief after 1 dose, call 911. OMEGA-3 FATTY ACIDS (FISH OIL) 1000 MG CAPS    Take 1,000 mg by mouth daily. OMEPRAZOLE (PRILOSEC) 20 MG CAPSULE    Take 20 mg by mouth Daily        ALLERGIES     Amoxicillin    FAMILY HISTORY       Family History   Problem Relation Age of Onset    Heart Disease Mother     Heart Disease Father     Heart Disease Sister     Heart Disease Brother           SOCIAL HISTORY       Social History     Socioeconomic History    Marital status:       Spouse name: None    Number of children: None    Years of education: None    Highest education level: None   Occupational History    None   Tobacco Use    Smoking status: Never Smoker    Smokeless tobacco: Never Used   Vaping Use    Vaping Use: Never used   Substance and Sexual Activity    Alcohol use: No     Alcohol/week: 0.0 standard drinks    Drug use: No    Sexual activity: None   Other Topics Concern    None   Social History Narrative    None     Social Determinants of Health     Financial Resource Strain:     Difficulty of Paying Living Expenses:    Food Insecurity:     Worried About Running Out of Food in the Last Year:     Shay of Food in the Last Year: Transportation Needs:     Lack of Transportation (Medical):  Lack of Transportation (Non-Medical):    Physical Activity:     Days of Exercise per Week:     Minutes of Exercise per Session:    Stress:     Feeling of Stress :    Social Connections:     Frequency of Communication with Friends and Family:     Frequency of Social Gatherings with Friends and Family:     Attends Yazdanism Services:     Active Member of Clubs or Organizations:     Attends Club or Organization Meetings:     Marital Status:    Intimate Partner Violence:     Fear of Current or Ex-Partner:     Emotionally Abused:     Physically Abused:     Sexually Abused:        SCREENINGS   NIH Stroke Scale  Interval: Baseline  Level of Consciousness (1a. ): Alert  LOC Questions (1b. ): Answers both correctly  LOC Commands (1c. ): Performs both tasks correctly  Best Gaze (2. ): Normal  Visual (3. ): No visual loss  Facial Palsy (4. ): Normal symmetrical movement  Motor Arm, Left (5a. ): No drift  Motor Arm, Right (5b. ): No drift  Motor Leg, Left (6a. ): No drift  Motor Leg, Right (6b. ): No drift  Limb Ataxia (7. ): Absent  Sensory (8. ): Normal  Best Language (9. ): No aphasia  Dysarthria (10. ): Normal  Extinction and Inattention (11): No abnormality  Total: 0Glasgow Coma Scale  Eye Opening: Spontaneous  Best Verbal Response: Oriented  Best Motor Response: Obeys commands  [place] Coma Scale Score: 15 @FLOW(32301621)@      PHYSICAL EXAM    (up to 7 for level 4, 8 or more for level 5)     ED Triage Vitals   BP Temp Temp Source Pulse Resp SpO2 Height Weight   06/03/21 1301 06/03/21 1259 06/03/21 1259 06/03/21 1259 06/03/21 1259 06/03/21 1259 06/03/21 1259 06/03/21 1259   (!) 195/82 97.8 °F (36.6 °C) Temporal 78 18 94 % 5' 2\" (1.575 m) 172 lb (78 kg)       Physical Exam  Vitals and nursing note reviewed. Constitutional:       General: She is awake. She is in acute distress. Appearance: Normal appearance.  She is well-developed and normal weight. She is not ill-appearing, toxic-appearing or diaphoretic. Comments: No photophobia. No phonophobia. HENT:      Head: Normocephalic and atraumatic. No Hawk's sign. Right Ear: External ear normal.      Left Ear: External ear normal.      Nose: Nose normal. No congestion or rhinorrhea. Mouth/Throat:      Mouth: Mucous membranes are moist.      Pharynx: Oropharynx is clear. No oropharyngeal exudate or posterior oropharyngeal erythema. Eyes:      General: No scleral icterus. Right eye: No foreign body or discharge. Left eye: No discharge. Extraocular Movements: Extraocular movements intact. Conjunctiva/sclera: Conjunctivae normal.      Left eye: No exudate. Pupils: Pupils are equal, round, and reactive to light. Neck:      Vascular: No JVD. Trachea: No tracheal deviation. Comments: No meningismus. Cardiovascular:      Rate and Rhythm: Normal rate and regular rhythm. No extrasystoles are present. Chest Wall: PMI is not displaced. No thrill. Pulses: Normal pulses. No decreased pulses. Radial pulses are 2+ on the right side and 2+ on the left side. Heart sounds: Normal heart sounds, S1 normal and S2 normal. Heart sounds not distant. No murmur heard. No systolic murmur is present. No diastolic murmur is present. No friction rub. No gallop. No S3 or S4 sounds. Pulmonary:      Effort: Pulmonary effort is normal. No respiratory distress. Breath sounds: Normal breath sounds. No stridor. No wheezing, rhonchi or rales. Chest:      Chest wall: No tenderness. Abdominal:      General: Abdomen is flat. Bowel sounds are normal. There is no distension. Palpations: Abdomen is soft. There is no mass. Tenderness: There is no abdominal tenderness. There is no right CVA tenderness, left CVA tenderness, guarding or rebound. Hernia: No hernia is present.    Musculoskeletal:         General: No swelling, Abnormal; Notable for the following components:       Result Value    Hemoglobin 9.6 (*)     Hematocrit 31.3 (*)     MCV 72.3 (*)     MCH 22.1 (*)     MCHC 30.6 (*)     RDW 17.9 (*)     All other components within normal limits   COMPREHENSIVE METABOLIC PANEL - Abnormal; Notable for the following components:    Glucose 118 (*)     CREATININE 0.40 (*)     All other components within normal limits   MAGNESIUM - Abnormal; Notable for the following components:    Magnesium 1.3 (*)     All other components within normal limits   TSH WITHOUT REFLEX - Abnormal; Notable for the following components:    TSH 0.352 (*)     All other components within normal limits   APTT   BRAIN NATRIURETIC PEPTIDE   CK   HIGH SENSITIVITY CRP   PROTIME-INR   TROPONIN       All other labs were within normal range or not returned as of this dictation. EMERGENCY DEPARTMENT COURSE and DIFFERENTIAL DIAGNOSIS/MDM:   Vitals:    Vitals:    06/03/21 1442 06/03/21 1455 06/03/21 1500 06/03/21 1509   BP: (!) 192/81 (!) 169/79 (!) 170/77 (!) 177/85   Pulse: 71 65 70 68   Resp: 18 18 17 17   Temp:       TempSrc:       SpO2: 98% 99% 97%    Weight:       Height:               MDM  Hope with Dr. Hiro Scott.  Heart score is 6. Pain relief with nitroglycerin. Patient has history of coronary disease that required intervention in August 2020 by Dr. Ryan Jolly. Patient given aspirin. Nitroglycerin relieved pain. Patient given a shot of Lovenox and oral beta-blocker. ED attending to place transitional orders. CONSULTS:  None    PROCEDURES:  Unless otherwise noted below, none     Procedures    FINAL IMPRESSION      1. Anginal chest pain at rest [facility])    2. History of coronary artery disease    3. Hypomagnesemia    4. Type 2 diabetes mellitus with hyperglycemia, unspecified whether long term insulin use ([facility] [address]    5.  Chronic hypertension          DISPOSITION/PLAN   DISPOSITION Admitted 06/03/2021 03:55:25 PM      PATIENT REFERRED TO:  No follow-up provider specified.     DISCHARGE MEDICATIONS:  New Prescriptions    No medications on file          (Please note that portions of this note were completed with a voice recognition program.  Efforts were made to edit the dictations but occasionally words are mis-transcribed.)    Gerardo Jiang DO (electronically signed)  Attending Emergency Physician         Gerardo Jiang DO  06/03/21 5973

## 2021-06-04 VITALS
RESPIRATION RATE: 20 BRPM | HEART RATE: 58 BPM | BODY MASS INDEX: 31.28 KG/M2 | TEMPERATURE: 97.9 F | HEIGHT: 62 IN | SYSTOLIC BLOOD PRESSURE: 144 MMHG | OXYGEN SATURATION: 93 % | DIASTOLIC BLOOD PRESSURE: 68 MMHG | WEIGHT: 170 LBS

## 2021-06-04 LAB
CHOLESTEROL, TOTAL: 96 MG/DL (ref 0–199)
EKG ATRIAL RATE: 56 BPM
EKG P AXIS: 40 DEGREES
EKG P-R INTERVAL: 142 MS
EKG Q-T INTERVAL: 440 MS
EKG QRS DURATION: 90 MS
EKG QTC CALCULATION (BAZETT): 424 MS
EKG R AXIS: -17 DEGREES
EKG T AXIS: 23 DEGREES
EKG VENTRICULAR RATE: 56 BPM
GLUCOSE BLD-MCNC: 125 MG/DL (ref 60–115)
GLUCOSE BLD-MCNC: 137 MG/DL (ref 60–115)
HCT VFR BLD CALC: 32.4 % (ref 37–47)
HDLC SERPL-MCNC: 35 MG/DL (ref 40–59)
HEMOGLOBIN: 10 G/DL (ref 12–16)
LDL CHOLESTEROL CALCULATED: 34 MG/DL (ref 0–129)
MAGNESIUM: 1.6 MG/DL (ref 1.7–2.4)
MAGNESIUM: 1.6 MG/DL (ref 1.7–2.4)
MCH RBC QN AUTO: 22.3 PG (ref 27–31.3)
MCHC RBC AUTO-ENTMCNC: 30.8 % (ref 33–37)
MCV RBC AUTO: 72.4 FL (ref 82–100)
PDW BLD-RTO: 17.9 % (ref 11.5–14.5)
PERFORMED ON: ABNORMAL
PERFORMED ON: ABNORMAL
PLATELET # BLD: 251 K/UL (ref 130–400)
RBC # BLD: 4.48 M/UL (ref 4.2–5.4)
TRIGL SERPL-MCNC: 135 MG/DL (ref 0–150)
TROPONIN: <0.01 NG/ML (ref 0–0.01)
TROPONIN: <0.01 NG/ML (ref 0–0.01)
WBC # BLD: 5.5 K/UL (ref 4.8–10.8)

## 2021-06-04 PROCEDURE — 96372 THER/PROPH/DIAG INJ SC/IM: CPT

## 2021-06-04 PROCEDURE — 93010 ELECTROCARDIOGRAM REPORT: CPT | Performed by: INTERNAL MEDICINE

## 2021-06-04 PROCEDURE — G0378 HOSPITAL OBSERVATION PER HR: HCPCS

## 2021-06-04 PROCEDURE — 80061 LIPID PANEL: CPT

## 2021-06-04 PROCEDURE — 83735 ASSAY OF MAGNESIUM: CPT

## 2021-06-04 PROCEDURE — 6370000000 HC RX 637 (ALT 250 FOR IP): Performed by: INTERNAL MEDICINE

## 2021-06-04 PROCEDURE — 96376 TX/PRO/DX INJ SAME DRUG ADON: CPT

## 2021-06-04 PROCEDURE — 85027 COMPLETE CBC AUTOMATED: CPT

## 2021-06-04 PROCEDURE — 93005 ELECTROCARDIOGRAM TRACING: CPT | Performed by: INTERNAL MEDICINE

## 2021-06-04 PROCEDURE — 84484 ASSAY OF TROPONIN QUANT: CPT

## 2021-06-04 PROCEDURE — 2580000003 HC RX 258: Performed by: INTERNAL MEDICINE

## 2021-06-04 PROCEDURE — 99217 PR OBSERVATION CARE DISCHARGE MANAGEMENT: CPT | Performed by: INTERNAL MEDICINE

## 2021-06-04 PROCEDURE — 36415 COLL VENOUS BLD VENIPUNCTURE: CPT

## 2021-06-04 PROCEDURE — 6360000002 HC RX W HCPCS: Performed by: INTERNAL MEDICINE

## 2021-06-04 RX ORDER — FLUTICASONE PROPIONATE 50 MCG
2 SPRAY, SUSPENSION (ML) NASAL EVERY 12 HOURS
Qty: 1 BOTTLE | Refills: 0 | Status: SHIPPED | OUTPATIENT
Start: 2021-06-04 | End: 2021-06-19

## 2021-06-04 RX ORDER — AZELASTINE 1 MG/ML
1 SPRAY, METERED NASAL 2 TIMES DAILY
Qty: 1 BOTTLE | Refills: 3 | Status: SHIPPED | OUTPATIENT
Start: 2021-06-04 | End: 2022-04-26

## 2021-06-04 RX ORDER — FUROSEMIDE 20 MG/1
20 TABLET ORAL 2 TIMES DAILY
Qty: 4 TABLET | Refills: 0 | Status: SHIPPED | OUTPATIENT
Start: 2021-06-04

## 2021-06-04 RX ORDER — OMEPRAZOLE 20 MG/1
20 CAPSULE, DELAYED RELEASE ORAL DAILY
Qty: 30 CAPSULE | Refills: 3 | Status: SHIPPED | OUTPATIENT
Start: 2021-06-04

## 2021-06-04 RX ORDER — CLONIDINE HYDROCHLORIDE 0.2 MG/1
0.2 TABLET ORAL 2 TIMES DAILY
Qty: 60 TABLET | Refills: 0 | Status: SHIPPED | OUTPATIENT
Start: 2021-06-04

## 2021-06-04 RX ADMIN — FUROSEMIDE 20 MG: 20 TABLET ORAL at 09:34

## 2021-06-04 RX ADMIN — METOPROLOL TARTRATE 25 MG: 25 TABLET, FILM COATED ORAL at 09:35

## 2021-06-04 RX ADMIN — ASPIRIN 81 MG: 81 TABLET, CHEWABLE ORAL at 09:34

## 2021-06-04 RX ADMIN — SODIUM CHLORIDE, PRESERVATIVE FREE 10 ML: 5 INJECTION INTRAVENOUS at 09:37

## 2021-06-04 RX ADMIN — Medication 650 MG: at 06:46

## 2021-06-04 RX ADMIN — CLONIDINE HYDROCHLORIDE 0.2 MG: 0.1 TABLET ORAL at 09:34

## 2021-06-04 RX ADMIN — LISINOPRIL 40 MG: 10 TABLET ORAL at 09:34

## 2021-06-04 RX ADMIN — MAGNESIUM SULFATE HEPTAHYDRATE 6000 MG: 500 INJECTION, SOLUTION INTRAMUSCULAR; INTRAVENOUS at 11:09

## 2021-06-04 RX ADMIN — ISOSORBIDE MONONITRATE 30 MG: 30 TABLET, EXTENDED RELEASE ORAL at 09:34

## 2021-06-04 RX ADMIN — CLOPIDOGREL BISULFATE 75 MG: 75 TABLET ORAL at 09:34

## 2021-06-04 RX ADMIN — GLIPIZIDE 5 MG: 5 TABLET ORAL at 06:20

## 2021-06-04 RX ADMIN — PANTOPRAZOLE SODIUM 40 MG: 40 TABLET, DELAYED RELEASE ORAL at 06:20

## 2021-06-04 RX ADMIN — ENOXAPARIN SODIUM 80 MG: 80 INJECTION SUBCUTANEOUS at 09:40

## 2021-06-04 ASSESSMENT — PAIN SCALES - GENERAL: PAINLEVEL_OUTOF10: 5

## 2021-06-04 NOTE — FLOWSHEET NOTE
SL and tele D/C'd. Patient givn discharge instructions and follow up information. Patient verbalized understanding. Patient awaiting daughter to transport her home.

## 2021-06-04 NOTE — DISCHARGE INSTR - COC
Continuity of Care Form    Patient Name: Ger Soto   :  1950  MRN:  [id number]    Admit date:  6/3/2021  Discharge date:  ***    Code Status Order: Full Code   Advance Directives:   Advance Care Flowsheet Documentation     Date/Time Healthcare Directive Type of Healthcare Directive Copy in [address] Agent's Name Healthcare Agent's Phone Number    21 1745  No, patient does not have an advance directive for healthcare treatment -- -- -- -- --          Admitting Physician:  Cornelius Kemp MD  PCP: Linda Whitmore MD    Discharging Nurse: [facility] Unit/Room#: P766/X254-77  Discharging Unit Phone Number: ***    Emergency Contact:   Extended Emergency Contact Information  Primary Emergency Contact: [address] Phone: [phone]  Relation: Child    Past Surgical History:  Past Surgical History:   Procedure Laterality Date    CORONARY ANGIOPLASTY  12    CORONARY ANGIOPLASTY WITH STENT PLACEMENT      DIAGNOSTIC CARDIAC CATH LAB PROCEDURE  12    HYSTERECTOMY  2018    TONSILLECTOMY         Immunization History: There is no immunization history on file for this patient.     Active Problems:  Patient Active Problem List   Diagnosis Code    CAD (coronary artery disease) I25.10    HTN (hypertension) I10    Dyslipidemia E78.5    Diabetes mellitus ([address] E11.9    History of TIA (transient ischemic attack) Z86.73    FH: CAD (coronary artery disease) Z82.49    Stable angina (HCC) I20.8    Chest pain R07.9    EVITA (obstructive sleep apnea) G47.33    Anginal chest pain at rest ([facility] [address] I20.8       Isolation/Infection:   Isolation          No Isolation        Patient Infection Status     None to display          Nurse Assessment:  Last Vital Signs: BP (!) 144/68   Pulse 58   Temp 97.9 °F (36.6 °C) (Oral)   Resp 20   Ht 5' 2\" (1.575 m)   Wt 170 lb (77.1 kg)   SpO2 93%   BMI 31.09 kg/m² Last documented pain score (0-10 scale): Pain Level: 5  Last Weight:   Wt Readings from Last 1 Encounters:   21 170 lb (77.1 kg)     Mental Status:  {IP PT MENTAL STATUS:}    IV Access:  { ELLIE IV ACCESS:414142917}    Nursing Mobility/ADLs:  Walking   {CHP DME SLTR:490026286}  Transfer  {CHP DME ISA}  Bathing  {CHP DME RLLA:327255071}  Dressing  {CHP DME LIMM:160988283}  Toileting  {CHP DME KTXO:423864465}  Feeding  {CHP DME HDKT:226282504}  Med Admin  {CHP DME KXTT:801076102}  Med Delivery   { ELLIE MED Delivery:674426565}    Wound Care Documentation and Therapy:        Elimination:  Continence:   · Bowel: {YES / GV:04684}  · Bladder: {YES / HS:78738}  Urinary Catheter: {Urinary Catheter:682934713}   Colostomy/Ileostomy/Ileal Conduit: {YES / NE:08040}       Date of Last BM: ***    Intake/Output Summary (Last 24 hours) at 2021 1113  Last data filed at 2021 0614  Gross per 24 hour   Intake 730 ml   Output --   Net 730 ml     I/O last 3 completed shifts: In: 36 [P.O.:720;  I.V.:10]  Out: -     Safety Concerns:     [address] [employer] Safety Concerns:884082257}    Impairments/Disabilities:      [address] [employer] Impairments/Disabilities:539449637}    Nutrition Therapy:  Current Nutrition Therapy:   [address] [employer] Diet List:685857042}    Routes of Feeding: {CHP DME Other Feedings:398187907}  Liquids: {Slp liquid thickness:61553}  Daily Fluid Restriction: {CHP DME Yes amt example:536883762}  Last Modified Barium Swallow with Video (Video Swallowing Test): {Done Not Done CJJW:871734165}    Treatments at the Time of Hospital Discharge:   Respiratory Treatments: ***  Oxygen Therapy:  {Therapy; copd oxygen:84853}  Ventilator:    { CC Vent PWPO:359850820}    Rehab Therapies: {THERAPEUTIC INTERVENTION:0167107940}  Weight Bearing Status/Restrictions: [address] Weight Bearin}  Other Medical Equipment (for information only, NOT a DME order):  {EQUIPMENT:268810409}  Other Treatments: ***    Patient's personal belongings (please select all that are sent with patient):  {CHP DME Belongings:201298766}    RN SIGNATURE:  {Esignature:787591144}    CASE MANAGEMENT/SOCIAL WORK SECTION    Inpatient Status Date: ***    Readmission Risk Assessment Score:  Readmission Risk              Risk of Unplanned Readmission:  0           Discharging to Facility/ Agency   · Name:   · Address:  · Phone:  · Fax:    Dialysis Facility (if applicable)   · Name:  · Address:  · Dialysis Schedule:  · Phone:  · Fax:    / signature: {Esignature:111351864}    PHYSICIAN SECTION    Prognosis: {Prognosis:7503947141}    Condition at Discharge: [address] [place] [address] Patient Condition:567989874}    Rehab Potential (if transferring to Rehab): {Prognosis:6250255731}    Recommended Labs or Other Treatments After Discharge: ***    Physician Certification: I certify the above information and transfer of Candida Shutters  is necessary for the continuing treatment of the diagnosis listed and that she requires {Admit to Appropriate Level of Care:56485} for {GREATER/LESS:020954768} 30 days.      Update Admission H&P: {CHP DME Changes in VZNXF:429875253}    PHYSICIAN SIGNATURE:  {Esignature:498061014}

## 2021-06-04 NOTE — DISCHARGE INSTR - DIET

## 2021-06-04 NOTE — DISCHARGE SUMMARY
Cardiology Discharge Summary      Patient Identification:  Drew Rick  : 1950  MRN: [id number]   Account: [de-identified]     Admit date: 6/3/2021  Discharge date: 2021   Attending provider: Guerline Rea MD        Primary care provider: Lai Santa MD     Admission Diagnoses:  <principal problem not specified>   CP      Discharge Diagnoses: Active Hospital Problems    Diagnosis Date Noted    Anginal chest pain at rest [facility]) [I20.8] 2021     Priority: Low     No ACS  HypoMag- corrected     Hospital Course:   Drew Rick is a 79 y.o. female admitted to [facility] on 6/3/2021 for . Med RX  Ruled Out    Procedures:   1. Consults:   None    Examination:  BP (!) 144/68   Pulse 58   Temp 97.9 °F (36.6 °C) (Oral)   Resp 20   Ht 5' 2\" (1.575 m)   Wt 170 lb (77.1 kg)   SpO2 93%   BMI 31.09 kg/m²    Physical Exam   Constitutional: She appears healthy. No distress. HENT:   Normal cephalic and Atraumatic   Eyes: Pupils are equal, round, and reactive to light. Neck: Thyroid normal. No JVD present. No neck adenopathy. No thyromegaly present. Cardiovascular: Normal rate, regular rhythm, normal heart sounds, intact distal pulses and normal pulses. Pulmonary/Chest: Effort normal and breath sounds normal. She has no wheezes. She has no rales. She exhibits no tenderness. Abdominal: Soft. Bowel sounds are normal. There is no abdominal tenderness. Musculoskeletal:         General: No tenderness or edema. Normal range of motion. Cervical back: Normal range of motion and neck supple. Neurological: She is alert and oriented to person, place, and time. Skin: Skin is warm. No cyanosis. Nails show no clubbing.        Medications:  Current Discharge Medication List      CONTINUE these medications which have CHANGED    Details   cloNIDine (CATAPRES) 0.2 MG tablet Take 1 tablet by mouth 2 times daily Do not stop this medication suddenly. Taper this medication off gradually if you want to discontinue  Qty: 60 tablet, Refills: 0      furosemide (LASIX) 20 MG tablet Take 1 tablet by mouth 2 times daily  Qty: 4 tablet, Refills: 0      !! metoprolol tartrate (LOPRESSOR) 25 MG tablet Take 1 tablet by mouth 2 times daily  Qty: 60 tablet, Refills: 3      omeprazole (PRILOSEC) 20 MG delayed release capsule Take 1 capsule by mouth Daily  Qty: 30 capsule, Refills: 3      fluticasone (FLONASE) 50 MCG/ACT nasal spray 2 sprays by Nasal route every 12 hours for 15 days  Qty: 1 Bottle, Refills: 0      azelastine (ASTELIN) 0.1 % nasal spray 1 spray by Nasal route 2 times daily Use in each nostril as directed  Qty: 1 Bottle, Refills: 3       !! - Potential duplicate medications found. Please discuss with provider. CONTINUE these medications which have NOT CHANGED    Details   atorvastatin (LIPITOR) 20 MG tablet Take 20 mg by mouth daily      !! metoprolol (LOPRESSOR) 100 MG tablet Take 100 mg by mouth 2 times daily      Cyanocobalamin (VITAMIN B-12) 2500 MCG SUBL Place 2,500 mcg under the tongue daily      ferrous sulfate (IRON 325) 325 (65 Fe) MG tablet Take 325 mg by mouth 2 times daily      ALPRAZolam (XANAX) 0.25 MG tablet Take 0.25 mg by mouth nightly as needed for Sleep. clopidogrel (PLAVIX) 75 MG tablet Take 1 tablet by mouth daily  Qty: 30 tablet, Refills: 0    Comments: NEEDS APPT FOR FURTHER REFILLS      isosorbide mononitrate (IMDUR) 60 MG extended release tablet Take 1 tablet by mouth daily  Qty: 30 tablet, Refills: 6      nitroGLYCERIN (NITROSTAT) 0.4 MG SL tablet up to max of 3 total doses. If no relief after 1 dose, call 911.   Qty: 25 tablet, Refills: 3      aspirin 81 MG chewable tablet Take 81 mg by mouth daily      amLODIPine (NORVASC) 10 MG tablet Take 5 mg by mouth daily       metformin (GLUCOPHAGE) 500 MG tablet Take 1,000 mg by mouth 2 times daily (with meals)       meclizine (ANTIVERT) 25 MG tablet And every 6-8 hours as needed for dizziness  Qty: 20 tablet, Refills: 0      HYDROcodone-acetaminophen (NORCO) 7.5-325 MG per tablet Take 1 tablet by mouth every 8 hours as needed for Pain . Qty: 12 tablet, Refills: 0       !! - Potential duplicate medications found. Please discuss with provider. STOP taking these medications       lisinopril (PRINIVIL;ZESTRIL) 40 MG tablet Comments:   Reason for Stopping:         albuterol sulfate  (90 Base) MCG/ACT inhaler Comments:   Reason for Stopping:         cyclobenzaprine (FLEXERIL) 10 MG tablet Comments:   Reason for Stopping:         Capsaicin 0.1 % CREA Comments:   Reason for Stopping:         glimepiride (AMARYL) 2 MG tablet Comments:   Reason for Stopping:         lovastatin (MEVACOR) 40 MG tablet Comments:   Reason for Stopping:         Omega-3 Fatty Acids (FISH OIL) 1000 MG CAPS Comments:   Reason for Stopping:               Significant Diagnostics:   Radiology: XR CHEST PORTABLE    Result Date: 6/3/2021  EXAMINATION: CHEST PORTABLE VIEW  CLINICAL HISTORY: Midsternal chest pain short of breath COMPARISONS: August 18, 2020  FINDINGS: Single  views of the chest is submitted. The cardiac silhouette is of normal size configuration. Pulmonary vascular unremarkable. Right sided trachea. No focal infiltrates. No Pneumothoraces. Again note is made of eventration of the right hemidiaphragm.                                                                                     NO ACUTE ACTIVE CARDIOPULMONARY PROCESS      Labs:   Recent Results (from the past 72 hour(s))   EKG 12 Lead    Collection Time: 06/03/21  1:09 PM   Result Value Ref Range    Ventricular Rate 73 BPM    Atrial Rate 73 BPM    P-R Interval 140 ms    QRS Duration 92 ms    Q-T Interval 374 ms    QTc Calculation (Bazett) 412 ms    P Axis 48 degrees    R Axis -1 degrees    T Axis 21 degrees   APTT    Collection Time: 06/03/21  1:30 PM   Result Value Ref Range    aPTT 24.6 24.4 - 36.8 sec Brain Natriuretic Peptide    Collection Time: 06/03/21  1:30 PM   Result Value Ref Range    Pro- pg/mL   CBC Auto Differential    Collection Time: 06/03/21  1:30 PM   Result Value Ref Range    WBC 6.4 4.8 - 10.8 K/uL    RBC 4.33 4.20 - 5.40 M/uL    Hemoglobin 9.6 (L) 12.0 - 16.0 g/dL    Hematocrit 31.3 (L) 37.0 - 47.0 %    MCV 72.3 (L) 82.0 - 100.0 fL    MCH 22.1 (L) 27.0 - 31.3 pg    MCHC 30.6 (L) 33.0 - 37.0 %    RDW 17.9 (H) 11.5 - 14.5 %    Platelets 459 642 - 375 K/uL    Neutrophils % 63.3 %    Lymphocytes % 27.1 %    Monocytes % 6.9 %    Eosinophils % 2.0 %    Basophils % 0.7 %    Neutrophils Absolute 4.0 1.4 - 6.5 K/uL    Lymphocytes Absolute 1.7 1.0 - 4.8 K/uL    Monocytes Absolute 0.4 0.2 - 0.8 K/uL    Eosinophils Absolute 0.1 0.0 - 0.7 K/uL    Basophils Absolute 0.0 0.0 - 0.2 K/uL    Anisocytosis 1+     Microcytes 1+     Hypochromia 1+     Poikilocytes 1+    CK    Collection Time: 06/03/21  1:30 PM   Result Value Ref Range    Total CK 50 0 - 170 U/L   Comprehensive Metabolic Panel    Collection Time: 06/03/21  1:30 PM   Result Value Ref Range    Sodium 141 135 - 144 mEq/L    Potassium 4.1 3.4 - 4.9 mEq/L    Chloride 101 95 - 107 mEq/L    CO2 27 20 - 31 mEq/L    Anion Gap 13 9 - 15 mEq/L    Glucose 118 (H) 70 - 99 mg/dL    BUN 12 8 - 23 mg/dL    CREATININE 0.40 (L) 0.50 - 0.90 mg/dL    GFR Non-African American >60.0 >60    GFR  >60.0 >60    Calcium 9.1 8.5 - 9.9 mg/dL    Total Protein 7.1 6.3 - 8.0 g/dL    Albumin 4.3 3.5 - 4.6 g/dL    Total Bilirubin 0.3 0.2 - 0.7 mg/dL    Alkaline Phosphatase 47 40 - 130 U/L    ALT 11 0 - 33 U/L    AST 13 0 - 35 U/L    Globulin 2.8 2.3 - 3.5 g/dL   High sensitivity CRP    Collection Time: 06/03/21  1:30 PM   Result Value Ref Range    CRP High Sensitivity 2.3 0.0 - 5.0 mg/L   Magnesium    Collection Time: 06/03/21  1:30 PM   Result Value Ref Range    Magnesium 1.3 (L) 1.7 - 2.4 mg/dL   Protime-INR    Collection Time: 06/03/21  1:30 PM   Result Value Ref Range    Protime 12.8 12.3 - 14.9 sec    INR 1.0    Troponin    Collection Time: 06/03/21  1:30 PM   Result Value Ref Range    Troponin <0.010 0.000 - 0.010 ng/mL   TSH without Reflex    Collection Time: 06/03/21  1:30 PM   Result Value Ref Range    TSH 0.352 (L) 0.440 - 3.860 uIU/mL   POCT Glucose    Collection Time: 06/03/21  5:38 PM   Result Value Ref Range    POC Glucose 120 (H) 60 - 115 mg/dl    Performed on ACCU-CHEK    POCT Glucose    Collection Time: 06/03/21  7:35 PM   Result Value Ref Range    POC Glucose 175 (H) 60 - 115 mg/dl    Performed on ACCU-CHEK    Troponin    Collection Time: 06/03/21 10:46 PM   Result Value Ref Range    Troponin <0.010 0.000 - 0.010 ng/mL   EKG 12 lead    Collection Time: 06/04/21  1:57 AM   Result Value Ref Range    Ventricular Rate 56 BPM    Atrial Rate 56 BPM    P-R Interval 142 ms    QRS Duration 90 ms    Q-T Interval 440 ms    QTc Calculation (Bazett) 424 ms    P Axis 40 degrees    R Axis -17 degrees    T Axis 23 degrees   Troponin    Collection Time: 06/04/21  4:53 AM   Result Value Ref Range    Troponin <0.010 0.000 - 0.010 ng/mL   Magnesium    Collection Time: 06/04/21  4:53 AM   Result Value Ref Range    Magnesium 1.6 (L) 1.7 - 2.4 mg/dL   Lipid panel - fasting    Collection Time: 06/04/21  4:53 AM   Result Value Ref Range    Cholesterol, Total 96 0 - 199 mg/dL    Triglycerides 135 0 - 150 mg/dL    HDL 35 (L) 40 - 59 mg/dL    LDL Calculated 34 0 - 129 mg/dL   CBC    Collection Time: 06/04/21  4:53 AM   Result Value Ref Range    WBC 5.5 4.8 - 10.8 K/uL    RBC 4.48 4.20 - 5.40 M/uL    Hemoglobin 10.0 (L) 12.0 - 16.0 g/dL    Hematocrit 32.4 (L) 37.0 - 47.0 %    MCV 72.4 (L) 82.0 - 100.0 fL    MCH 22.3 (L) 27.0 - 31.3 pg    MCHC 30.8 (L) 33.0 - 37.0 %    RDW 17.9 (H) 11.5 - 14.5 %    Platelets 899 938 - 281 K/uL   POCT Glucose    Collection Time: 06/04/21  5:46 AM   Result Value Ref Range    POC Glucose 137 (H) 60 - 115 mg/dl    Performed on ACCU-CHEK Follow-up visits: Bairon Kilgore Carlos Manuel Jones, DO  [address] [facility]  [address]  [phone]    Schedule an appointment as soon as possible for a visit in 3 weeks         [facility]    Diagnosis Date Noted    Anginal chest pain at rest [facility]) [I20.8] 06/03/2021     Priority: Low     1. CP - no ACS  2. LVEF 55%  3. CAD -RCA VIGNESH x2   4. HTN Stable  1. HPL -statin   2.  HypoMag- replaced               Electronically signed by Alannah Gordillo MD on 6/4/2021 at 9:52 AM

## 2021-08-26 ENCOUNTER — HOSPITAL ENCOUNTER (EMERGENCY)
Age: 71
Discharge: HOME | End: 2021-08-26
Attending: STUDENT IN AN ORGANIZED HEALTH CARE EDUCATION/TRAINING PROGRAM
Payer: MEDICARE

## 2021-08-26 ENCOUNTER — APPOINTMENT (OUTPATIENT)
Dept: GENERAL RADIOLOGY | Age: 71
End: 2021-08-26
Payer: MEDICARE

## 2021-08-26 VITALS
OXYGEN SATURATION: 98 % | BODY MASS INDEX: 32 KG/M2 | RESPIRATION RATE: 18 BRPM | HEART RATE: 98 BPM | HEIGHT: 60 IN | TEMPERATURE: 99.9 F | DIASTOLIC BLOOD PRESSURE: 80 MMHG | WEIGHT: 163 LBS | SYSTOLIC BLOOD PRESSURE: 160 MMHG

## 2021-08-26 DIAGNOSIS — U07.1 COVID-19: Primary | ICD-10-CM

## 2021-08-26 LAB — SARS-COV-2, NAAT: DETECTED

## 2021-08-26 PROCEDURE — 6370000000 HC RX 637 (ALT 250 FOR IP): Performed by: NURSE PRACTITIONER

## 2021-08-26 PROCEDURE — 71045 X-RAY EXAM CHEST 1 VIEW: CPT

## 2021-08-26 PROCEDURE — 99284 EMERGENCY DEPT VISIT MOD MDM: CPT

## 2021-08-26 RX ORDER — ACETAMINOPHEN 500 MG
1000 TABLET ORAL ONCE
Status: COMPLETED | OUTPATIENT
Start: 2021-08-26 | End: 2021-08-26

## 2021-08-26 RX ORDER — BENZONATATE 100 MG/1
100 CAPSULE ORAL 3 TIMES DAILY PRN
Qty: 20 CAPSULE | Refills: 0 | Status: SHIPPED | OUTPATIENT
Start: 2021-08-26 | End: 2022-04-26

## 2021-08-26 RX ADMIN — ACETAMINOPHEN 1000 MG: 500 TABLET ORAL at 18:37

## 2021-08-26 ASSESSMENT — PAIN DESCRIPTION - FREQUENCY: FREQUENCY: INTERMITTENT

## 2021-08-26 ASSESSMENT — ENCOUNTER SYMPTOMS
DIARRHEA: 0
COLOR CHANGE: 0
NAUSEA: 0
EYE DISCHARGE: 0
BACK PAIN: 0
BLOOD IN STOOL: 0
RHINORRHEA: 0
SORE THROAT: 0
SHORTNESS OF BREATH: 0
WHEEZING: 0
TROUBLE SWALLOWING: 0
ABDOMINAL PAIN: 0
EYE REDNESS: 0
COUGH: 1
CONSTIPATION: 0
EYE PAIN: 0
VOMITING: 0

## 2021-08-26 ASSESSMENT — PAIN DESCRIPTION - ORIENTATION: ORIENTATION: UPPER

## 2021-08-26 ASSESSMENT — PAIN SCALES - GENERAL
PAINLEVEL_OUTOF10: 8
PAINLEVEL_OUTOF10: 0
PAINLEVEL_OUTOF10: 8

## 2021-08-26 ASSESSMENT — PAIN DESCRIPTION - PAIN TYPE: TYPE: CHRONIC PAIN

## 2021-08-26 ASSESSMENT — PAIN DESCRIPTION - LOCATION: LOCATION: BACK

## 2021-08-26 NOTE — ED PROVIDER NOTES
[facility] ED  EMERGENCY DEPARTMENT ENCOUNTER      Pt Name: Barbara Craft  MRN: [id number]  [place] 1950  Date of evaluation: 8/26/2021  Provider: NIKKI Cardona CNP    CHIEF COMPLAINT       Chief Complaint   Patient presents with    Concern For COVID-19     cough and fever that began today         HISTORY OF PRESENT ILLNESS   (Location/Symptom, Timing/Onset,Context/Setting, Quality, Duration, Modifying Factors, Severity)  Note limiting factors. Barbara Craft is a 79 y.o. female who presents to the emergency department with a chart reviewed past medical history of coronary artery disease, hypertension, dyslipidemia, diabetes, and obstructive sleep apnea for chief complaint of cough, congestion and fever that began yesterday and the cough and fever worsened today. Patient took positive Covid test at home and she is unsure if it is truly positive and would like it repeated today. Her cough has gotten worse but she denies any loss of taste and smell, denies any trouble breathing, and denies any nausea or vomiting. Patient denies any chest pain. She denies having any alleviating modifying factors. Nursing Notes were reviewed. REVIEW OF SYSTEMS    (2-9 systems for level 4, 10 or more for level 5)     Review of Systems   Constitutional: Positive for fatigue and fever. Negative for activity change, appetite change and unexpected weight change. HENT: Negative for congestion, ear pain, rhinorrhea, sore throat and trouble swallowing. Eyes: Negative for pain, discharge and redness. Respiratory: Positive for cough. Negative for shortness of breath and wheezing. Cardiovascular: Negative for chest pain and palpitations. Gastrointestinal: Negative for abdominal pain, blood in stool, constipation, diarrhea, nausea and vomiting. Endocrine: Negative for polydipsia and polyuria. Genitourinary: Negative for decreased urine volume, dysuria, flank pain and hematuria. Musculoskeletal: Negative for arthralgias, back pain and myalgias. Skin: Negative for color change, rash and wound. Neurological: Negative for dizziness, syncope, weakness, light-headedness and headaches. Psychiatric/Behavioral: Negative for behavioral problems. All other systems reviewed and are negative. Except as noted above the remainder of the review of systems was reviewed and negative. PAST MEDICAL HISTORY     Past Medical History:   Diagnosis Date    Anxiety     CAD (coronary artery disease)     Diabetes mellitus ([address]     Dyslipidemia     FH: CAD (coronary artery disease)     History of TIA (transient ischemic attack)     HTN (hypertension)     EVITA (obstructive sleep apnea) 10/16/2020    Type 2 diabetes mellitus without complication ([address]     Unstable angina ([facility] 12/13/12     Past Surgical History:   Procedure Laterality Date    CORONARY ANGIOPLASTY  12/14/12    CORONARY ANGIOPLASTY WITH STENT PLACEMENT      DIAGNOSTIC CARDIAC CATH LAB PROCEDURE  12/14/12    HYSTERECTOMY  03/2018    TONSILLECTOMY       Social History     Socioeconomic History    Marital status:       Spouse name: Not on file    Number of children: Not on file    Years of education: Not on file    Highest education level: Not on file   Occupational History    Not on file   Tobacco Use    Smoking status: Never Smoker    Smokeless tobacco: Never Used   Vaping Use    Vaping Use: Never used   Substance and Sexual Activity    Alcohol use: No     Alcohol/week: 0.0 standard drinks    Drug use: No    Sexual activity: Not on file   Other Topics Concern    Not on file   Social History Narrative    Not on file     Social Determinants of Health     Financial Resource Strain:     Difficulty of Paying Living Expenses:    Food Insecurity:     Worried About Running Out of Food in the Last Year:     [address] in the Last Year:    Transportation Needs:     Lack of Transportation (Medical):    [facility] rash.   Neurological:      Mental Status: She is alert and oriented to person, place, and time. Cranial Nerves: No cranial nerve deficit. Psychiatric:         Behavior: Behavior normal.         RESULTS     EKG: All EKG's are interpreted by the Emergency Department Physician who either signs or Co-signsthis chart in the absence of a cardiologist.        RADIOLOGY:   Ferdie Coma such as CT, Ultrasound and MRI are read by the radiologist. Plain radiographic images are visualized and preliminarily interpreted by the emergency physician with the below findings:    NAD    Interpretation per the Radiologist below, if available at the time ofthis note:    XR CHEST PORTABLE    (Results Pending)         ED BEDSIDE ULTRASOUND:   Performed by ED Physician - none    LABS:  Labs Reviewed   COVID-19, RAPID - Abnormal; Notable for the following components:       Result Value    SARS-CoV-2, NAAT DETECTED (*)     All other components within normal limits    Narrative:     Therese Cook tel. [phone],  Carey Nur results called to and read back by, 08/26/2021 18:22, by Ann Oliveira       All other labs were within normal range or not returned as of this dictation. EMERGENCY DEPARTMENT COURSE and DIFFERENTIAL DIAGNOSIS/MDM:   Vitals:    Vitals:    08/26/21 1729   BP: (!) 170/85   Pulse: 100   Resp: 20   Temp: 99.9 °F (37.7 °C)   TempSrc: Oral   SpO2: 98%   Weight: 163 lb (73.9 kg)   Height: 5' (1.524 m)            MDM   Patient is a 80-year-old female presenting the ER with a chief complaint of cough and Covid concern. Her temperature is 99.9 close to fever but not quite there. She is otherwise hemodynamically stable. Swab for Covid and chest x-ray obtained. Patient will be reevaluated. Covid is positive. Patient instructed on quarantining and given a prescription for Tessalon Perles for her cough and instructed to alternate between Tylenol and Motrin for fever control.   Instructed to come back to the emergency department if she develops any shortness of breath or worsening symptoms. She is currently 98% on room air. Chest x-ray clear. Discharged in stable condition with stable vital signs.       CRITICAL CARE TIME       CONSULTS:  None    PROCEDURES:  Unless otherwise noted below, none     Procedures    FINAL IMPRESSION      1. COVID-19          DISPOSITION/PLAN   DISPOSITION Decision To Discharge 08/26/2021 07:33:04 PM      PATIENT REFERRED TO:  Amy Pérez MD  [address] [place] [address]  [phone]    Schedule an appointment as soon as possible for a visit in 1 day        DISCHARGE MEDICATIONS:  New Prescriptions    BENZONATATE (TESSALON PERLES) 100 MG CAPSULE    Take 1 capsule by mouth 3 times daily as needed for Cough          (Please notethat portions of this note were completed with a voice recognition program.  Efforts were made to edit the dictations but occasionally words are mis-transcribed.)    NIKKI Felder CNP (electronically signed)  Attending Emergency Physician         [facility], APRN - CNP  08/26/21 [id number]

## 2021-08-26 NOTE — ED TRIAGE NOTES
Patient came to the ED for COVID-like symptoms. Pt complains of cough, fever that began today. Respirations even and unlabored. Complains of chronic upper back pain. A&Ox4.

## 2021-08-26 NOTE — ED NOTES
Patient states she took a home Covid test, which was \"positive. \" She is here to \"Get a real test to know for sure. \" Otherwise stable.      Christiane Farrell RN  08/26/21 [id number]

## 2021-08-27 ENCOUNTER — CARE COORDINATION (OUTPATIENT)
Dept: CARE COORDINATION | Age: 71
End: 2021-08-27

## 2021-08-27 NOTE — CARE COORDINATION
Patient contacted regarding COVID-19 diagnosis. Discussed COVID-19 related testing which was available at this time. Test results were positive. Patient informed of results, if available? Yes. Ambulatory Care Manager contacted the patient by telephone to perform post discharge assessment. Call within 2 business days of discharge: Yes. Verified name and  with patient as identifiers. Provided introduction to self, and explanation of the CTN/ACM role, and reason for call due to risk factors for infection and/or exposure to COVID-19. Symptoms reviewed with patient who verbalized the following symptoms: fever, pain or aching joints, cough, no worsening symptoms and headache and sore throat. Due to no new or worsening symptoms encounter was not routed to provider for escalation. Discussed follow-up appointments. If no appointment was previously scheduled, appointment scheduling offered: No.  [facility] follow up appointment(s): No future appointments. Non-[facility] follow up appointment(s): 2021 appointment with PCP. Non-face-to-face services provided:  Education of patient/family/caregiver/guardian to support self-management-Covid-19     Advance Care Planning:   Does patient have an Advance Directive:  not reviewed. Educated patient about risk for severe COVID-19 due to risk factors according to CDC guidelines. ACM reviewed discharge instructions, medical action plan and red flag symptoms with the patient who verbalized understanding. Discussed COVID vaccination status: Yes and patient received 2 doses of Pizer vaccine. Education provided on COVID-19 vaccination as appropriate. Discussed exposure protocols and quarantine with CDC Guidelines. Patient was given an opportunity to verbalize any questions and concerns and agrees to contact ACM or health care provider for questions related to their healthcare.     Reviewed and educated patient on any new and changed medications related to discharge diagnosis Was patient discharged with a pulse oximeter? No Discussed and confirmed pulse oximeter discharge instructions and when to notify provider or seek emergency care. ACM provided contact information. Plan for follow-up call in 5-7 days based on severity of symptoms and risk factors. Spoke with patient through  with Translation Service. Patient agreed to follow-up in 1 week.

## 2021-09-03 ENCOUNTER — CARE COORDINATION (OUTPATIENT)
Dept: CARE COORDINATION | Age: 71
End: 2021-09-03

## 2021-09-03 NOTE — CARE COORDINATION
Call placed to patient for COVID-19 Risk Monitoring through [employer] translation service. . Unable to reach patient by phone. Message left regarding the purpose of the call. Requested call back. Provided call back number.

## 2021-09-10 ENCOUNTER — CARE COORDINATION (OUTPATIENT)
Dept: CARE COORDINATION | Age: 71
End: 2021-09-10

## 2021-09-10 NOTE — CARE COORDINATION
Call placed to patient with [employer] for COVID-19 Risk Monitoring. Unable to reach patient by phone. Message left regarding the purpose of the call by . Requested call back. Provided call back number.

## 2021-09-13 ENCOUNTER — CARE COORDINATION (OUTPATIENT)
Dept: CARE COORDINATION | Age: 71
End: 2021-09-13

## 2021-11-02 RX ORDER — OMEPRAZOLE 20 MG/1
20 CAPSULE, DELAYED RELEASE ORAL DAILY
Qty: 30 CAPSULE | Refills: 3 | OUTPATIENT
Start: 2021-11-02

## 2021-11-20 ENCOUNTER — HOSPITAL ENCOUNTER (EMERGENCY)
Age: 71
Discharge: HOME | End: 2021-11-21
Attending: EMERGENCY MEDICINE
Payer: MEDICARE

## 2021-11-20 DIAGNOSIS — I10 HYPERTENSION, UNSPECIFIED TYPE: Primary | ICD-10-CM

## 2021-11-20 LAB
ALBUMIN SERPL-MCNC: 4.6 G/DL (ref 3.5–4.6)
ALP BLD-CCNC: 55 U/L (ref 40–130)
ALT SERPL-CCNC: 14 U/L (ref 0–33)
ANION GAP SERPL CALCULATED.3IONS-SCNC: 17 MEQ/L (ref 9–15)
AST SERPL-CCNC: 17 U/L (ref 0–35)
BASOPHILS ABSOLUTE: 0 K/UL (ref 0–0.2)
BASOPHILS RELATIVE PERCENT: 0.6 %
BILIRUB SERPL-MCNC: 0.3 MG/DL (ref 0.2–0.7)
BUN BLDV-MCNC: 15 MG/DL (ref 8–23)
CALCIUM SERPL-MCNC: 10 MG/DL (ref 8.5–9.9)
CHLORIDE BLD-SCNC: 99 MEQ/L (ref 95–107)
CO2: 24 MEQ/L (ref 20–31)
CREAT SERPL-MCNC: 0.64 MG/DL (ref 0.5–0.9)
EOSINOPHILS ABSOLUTE: 0.1 K/UL (ref 0–0.7)
EOSINOPHILS RELATIVE PERCENT: 1.8 %
GFR AFRICAN AMERICAN: >60
GFR NON-AFRICAN AMERICAN: >60
GLOBULIN: 2.9 G/DL (ref 2.3–3.5)
GLUCOSE BLD-MCNC: 115 MG/DL (ref 70–99)
HCT VFR BLD CALC: 38.1 % (ref 37–47)
HEMOGLOBIN: 12 G/DL (ref 12–16)
LYMPHOCYTES ABSOLUTE: 2.5 K/UL (ref 1–4.8)
LYMPHOCYTES RELATIVE PERCENT: 37.1 %
MCH RBC QN AUTO: 27 PG (ref 27–31.3)
MCHC RBC AUTO-ENTMCNC: 31.5 % (ref 33–37)
MCV RBC AUTO: 85.5 FL (ref 82–100)
MONOCYTES ABSOLUTE: 0.6 K/UL (ref 0.2–0.8)
MONOCYTES RELATIVE PERCENT: 8.3 %
NEUTROPHILS ABSOLUTE: 3.5 K/UL (ref 1.4–6.5)
NEUTROPHILS RELATIVE PERCENT: 52.2 %
PDW BLD-RTO: 15.9 % (ref 11.5–14.5)
PLATELET # BLD: 233 K/UL (ref 130–400)
POTASSIUM REFLEX MAGNESIUM: 3.8 MEQ/L (ref 3.4–4.9)
RBC # BLD: 4.46 M/UL (ref 4.2–5.4)
SODIUM BLD-SCNC: 140 MEQ/L (ref 135–144)
TOTAL PROTEIN: 7.5 G/DL (ref 6.3–8)
TROPONIN: <0.01 NG/ML (ref 0–0.01)
WBC # BLD: 6.7 K/UL (ref 4.8–10.8)

## 2021-11-20 PROCEDURE — 85025 COMPLETE CBC W/AUTO DIFF WBC: CPT

## 2021-11-20 PROCEDURE — 36415 COLL VENOUS BLD VENIPUNCTURE: CPT

## 2021-11-20 PROCEDURE — 99283 EMERGENCY DEPT VISIT LOW MDM: CPT

## 2021-11-20 PROCEDURE — 93005 ELECTROCARDIOGRAM TRACING: CPT | Performed by: EMERGENCY MEDICINE

## 2021-11-20 PROCEDURE — 80053 COMPREHEN METABOLIC PANEL: CPT

## 2021-11-20 PROCEDURE — 84484 ASSAY OF TROPONIN QUANT: CPT

## 2021-11-20 RX ORDER — ASPIRIN 81 MG/1
324 TABLET, CHEWABLE ORAL ONCE
Status: DISCONTINUED | OUTPATIENT
Start: 2021-11-20 | End: 2021-11-21

## 2021-11-20 ASSESSMENT — PAIN DESCRIPTION - FREQUENCY: FREQUENCY: CONTINUOUS

## 2021-11-20 ASSESSMENT — PAIN DESCRIPTION - ORIENTATION: ORIENTATION: LEFT

## 2021-11-20 ASSESSMENT — PAIN DESCRIPTION - ONSET: ONSET: PROGRESSIVE

## 2021-11-20 ASSESSMENT — PAIN DESCRIPTION - LOCATION: LOCATION: CHEST

## 2021-11-20 ASSESSMENT — PAIN SCALES - GENERAL: PAINLEVEL_OUTOF10: 8

## 2021-11-20 ASSESSMENT — PAIN DESCRIPTION - PAIN TYPE: TYPE: ACUTE PAIN

## 2021-11-21 VITALS
HEART RATE: 74 BPM | RESPIRATION RATE: 18 BRPM | OXYGEN SATURATION: 98 % | SYSTOLIC BLOOD PRESSURE: 165 MMHG | TEMPERATURE: 98 F | DIASTOLIC BLOOD PRESSURE: 90 MMHG | WEIGHT: 163 LBS | HEIGHT: 61 IN | BODY MASS INDEX: 30.78 KG/M2

## 2021-11-21 PROCEDURE — 6370000000 HC RX 637 (ALT 250 FOR IP): Performed by: EMERGENCY MEDICINE

## 2021-11-21 RX ORDER — CLONIDINE HYDROCHLORIDE 0.1 MG/1
0.1 TABLET ORAL ONCE
Status: COMPLETED | OUTPATIENT
Start: 2021-11-21 | End: 2021-11-21

## 2021-11-21 RX ADMIN — CLONIDINE HYDROCHLORIDE 0.1 MG: 0.1 TABLET ORAL at 00:16

## 2021-11-21 NOTE — ED NOTES
Pt understands discharge instructions.   Pt instructed to follow up with PCP   Prescriptions explained   Pt told to come back for new or worsening symptoms  No further questions         Mac Duran RN  11/21/21 [id number]

## 2021-11-21 NOTE — ED PROVIDER NOTES
[facility] [facility] ED  EMERGENCY DEPARTMENT ENCOUNTER      Pt Name: Alexandra Jones  MRN: [id number]  [place] 1950  Date of evaluation: 11/20/2021  Provider: Marilyn Alegre MD    CHIEF COMPLAINT       Chief Complaint   Patient presents with    Chest Pain     x2 days; worse at night         HISTORY OF PRESENT ILLNESS   (Location/Symptom, Timing/Onset, Context/Setting, Quality, Duration, Modifying Factors, Severity)  Note limiting factors. 70-year-old female presenting with L sided chest pain. Symptoms have been ongoing for about 2 days. Symptoms are not exertional.  No cough, shortness of breath or fever. Patient does have a history of high blood pressure and has not taken her evening meds. On arrival to the room patient states that the chest pain has improved and currently notes no symptoms. Nursing Notes were reviewed. REVIEW OF SYSTEMS    (2-9 systems for level 4, 10 or more for level 5)     Review of Systems   Cardiovascular: Positive for chest pain. All other systems reviewed and are negative. Except as noted above the remainder of the review of systems was reviewed and negative.        PAST MEDICAL HISTORY     Past Medical History:   Diagnosis Date    Anxiety     CAD (coronary artery disease)     Diabetes mellitus ([address]     Dyslipidemia     FH: CAD (coronary artery disease)     History of TIA (transient ischemic attack)     HTN (hypertension)     EVITA (obstructive sleep apnea) 10/16/2020    Type 2 diabetes mellitus without complication ([address]     Unstable angina ([address] 12/13/12         SURGICAL HISTORY       Past Surgical History:   Procedure Laterality Date    CORONARY ANGIOPLASTY  12/14/12    CORONARY ANGIOPLASTY WITH STENT PLACEMENT      DIAGNOSTIC CARDIAC CATH LAB PROCEDURE  12/14/12    HYSTERECTOMY  03/2018    TONSILLECTOMY           CURRENT MEDICATIONS       Current Discharge Medication List      CONTINUE these medications which have NOT CHANGED    Details benzonatate (TESSALON PERLES) 100 MG capsule Take 1 capsule by mouth 3 times daily as needed for Cough  Qty: 20 capsule, Refills: 0      cloNIDine (CATAPRES) 0.2 MG tablet Take 1 tablet by mouth 2 times daily Do not stop this medication suddenly. Taper this medication off gradually if you want to discontinue  Qty: 60 tablet, Refills: 0      furosemide (LASIX) 20 MG tablet Take 1 tablet by mouth 2 times daily  Qty: 4 tablet, Refills: 0      metoprolol tartrate (LOPRESSOR) 25 MG tablet Take 1 tablet by mouth 2 times daily  Qty: 60 tablet, Refills: 3      omeprazole (PRILOSEC) 20 MG delayed release capsule Take 1 capsule by mouth Daily  Qty: 30 capsule, Refills: 3      fluticasone (FLONASE) 50 MCG/ACT nasal spray 2 sprays by Nasal route every 12 hours for 15 days  Qty: 1 Bottle, Refills: 0      azelastine (ASTELIN) 0.1 % nasal spray 1 spray by Nasal route 2 times daily Use in each nostril as directed  Qty: 1 Bottle, Refills: 3      atorvastatin (LIPITOR) 20 MG tablet Take 20 mg by mouth daily      Cyanocobalamin (VITAMIN B-12) 2500 MCG SUBL Place 2,500 mcg under the tongue daily      ferrous sulfate (IRON 325) 325 (65 Fe) MG tablet Take 325 mg by mouth 2 times daily      ALPRAZolam (XANAX) 0.25 MG tablet Take 0.25 mg by mouth nightly as needed for Sleep. clopidogrel (PLAVIX) 75 MG tablet Take 1 tablet by mouth daily  Qty: 30 tablet, Refills: 0    Comments: NEEDS APPT FOR FURTHER REFILLS      isosorbide mononitrate (IMDUR) 60 MG extended release tablet Take 1 tablet by mouth daily  Qty: 30 tablet, Refills: 6      nitroGLYCERIN (NITROSTAT) 0.4 MG SL tablet up to max of 3 total doses. If no relief after 1 dose, call 911.   Qty: 25 tablet, Refills: 3      meclizine (ANTIVERT) 25 MG tablet And every 6-8 hours as needed for dizziness  Qty: 20 tablet, Refills: 0      aspirin 81 MG chewable tablet Take 81 mg by mouth daily      HYDROcodone-acetaminophen (NORCO) 7.5-325 MG per tablet Take 1 tablet by mouth every 8 hours as needed for Pain . Qty: 12 tablet, Refills: 0      amLODIPine (NORVASC) 10 MG tablet Take 5 mg by mouth daily       metformin (GLUCOPHAGE) 500 MG tablet Take 1,000 mg by mouth 2 times daily (with meals)              ALLERGIES     Amoxicillin    FAMILY HISTORY       Family History   Problem Relation Age of Onset    Heart Disease Mother     Heart Disease Father     Heart Disease Sister     Heart Disease Brother           SOCIAL HISTORY       Social History     Socioeconomic History    Marital status:      Spouse name: None    Number of children: None    Years of education: None    Highest education level: None   Occupational History    None   Tobacco Use    Smoking status: Never Smoker    Smokeless tobacco: Never Used   Vaping Use    Vaping Use: Never used   Substance and Sexual Activity    Alcohol use: No     Alcohol/week: 0.0 standard drinks    Drug use: No    Sexual activity: None   Other Topics Concern    None   Social History Narrative    None     Social Determinants of Health     Financial Resource Strain:     Difficulty of Paying Living Expenses: Not on file   Food Insecurity:     Worried About Running Out of Food in the Last Year: Not on file    Shay of Food in the Last Year: Not on file   Transportation Needs:     Lack of Transportation (Medical): Not on file    Lack of Transportation (Non-Medical):  Not on file   Physical Activity:     Days of Exercise per Week: Not on file    Minutes of Exercise per Session: Not on file   Stress:     Feeling of Stress : Not on file   Social Connections:     Frequency of Communication with Friends and Family: Not on file    Frequency of Social Gatherings with Friends and Family: Not on file    Attends Oriental orthodox Services: Not on file    Active Member of Clubs or Organizations: Not on file    Attends Club or Organization Meetings: Not on file    Marital Status: Not on file   Intimate Partner Violence:     Fear of Current or Ex-Partner: Not on file    Emotionally Abused: Not on file    Physically Abused: Not on file    Sexually Abused: Not on file   Housing Stability:     Unable to Pay for Housing in the Last Year: Not on file    Number of [place] in the Last Year: Not on file    Unstable Housing in the Last Year: Not on file       SCREENINGS               PHYSICAL EXAM    (up to 7 for level 4, 8 or more for level 5)     ED Triage Vitals [11/20/21 2144]   BP Temp Temp Source Pulse Resp SpO2 Height Weight   (!) 219/109 98 °F (36.7 °C) Oral 74 18 97 % 5' 1\" (1.549 m) 163 lb (73.9 kg)       Physical Exam  Vitals and nursing note reviewed. Constitutional:       General: She is not in acute distress. Appearance: Normal appearance. She is well-developed. She is not ill-appearing. HENT:      Head: Normocephalic and atraumatic. Mouth/Throat:      Mouth: Mucous membranes are moist.      Pharynx: Oropharynx is clear. Eyes:      Extraocular Movements: Extraocular movements intact. Conjunctiva/sclera: Conjunctivae normal.   Cardiovascular:      Rate and Rhythm: Normal rate and regular rhythm. Pulmonary:      Effort: Pulmonary effort is normal.      Breath sounds: Normal breath sounds. Abdominal:      General: Bowel sounds are normal.      Palpations: Abdomen is soft. Tenderness: There is no abdominal tenderness. Musculoskeletal:         General: No deformity. Normal range of motion. Cervical back: Normal range of motion and neck supple. Skin:     General: Skin is warm and dry. Capillary Refill: Capillary refill takes less than 2 seconds. Neurological:      General: No focal deficit present. Mental Status: She is alert and oriented to person, place, and time. Mental status is at baseline. Cranial Nerves: No cranial nerve deficit. Psychiatric:         Thought Content:  Thought content normal.         DIAGNOSTIC RESULTS     EKG: All EKG's are interpreted by the Emergency Department outpatient follow up. Patient should follow up with PCP in 2-3 days or return to ED immediately for any new or worsening symptoms. Patient is well appearing on discharge and agreeable with plan of care. Procedures    CRITICAL CARE TIME   Total Critical Care time was 0 minutes, excluding separately reportable procedures. There was a high probability of clinically significant/life threatening deterioration in the patient's condition which required my urgent intervention. FINAL IMPRESSION      1.  Hypertension, unspecified type          DISPOSITION/PLAN   DISPOSITION Decision To Discharge 11/21/2021 12:13:16 AM      (Please note that portions of this note were completed with a voice recognition program.  Efforts were made to edit the dictations but occasionally words are mis-transcribed.)    Juan De La Cruz MD (electronically signed)  Attending Emergency Physician        Juan De La Cruz MD  11/21/21 [id number]

## 2021-11-21 NOTE — ED TRIAGE NOTES
Patient came to the ED for left chest pain. Patient states the chest pain began 2 days ago and has gotten worse at night. Patient describes the pain as tightness and states the pain radiates to her left arm. Denies HA, diplopia, blurry vision, ABD pain, fevers, chills, nausea, vomiting, diarrhea, dysuria. A&Ox4. Respirations even and unlabored.

## 2021-11-23 LAB
EKG ATRIAL RATE: 69 BPM
EKG P AXIS: 39 DEGREES
EKG P-R INTERVAL: 128 MS
EKG Q-T INTERVAL: 402 MS
EKG QRS DURATION: 92 MS
EKG QTC CALCULATION (BAZETT): 430 MS
EKG R AXIS: -6 DEGREES
EKG T AXIS: 8 DEGREES
EKG VENTRICULAR RATE: 69 BPM

## 2021-11-23 PROCEDURE — 93010 ELECTROCARDIOGRAM REPORT: CPT | Performed by: INTERNAL MEDICINE

## 2022-04-08 ENCOUNTER — APPOINTMENT (OUTPATIENT)
Dept: GENERAL RADIOLOGY | Age: 72
End: 2022-04-08
Payer: MEDICARE

## 2022-04-08 ENCOUNTER — APPOINTMENT (OUTPATIENT)
Dept: CT IMAGING | Age: 72
End: 2022-04-08
Payer: MEDICARE

## 2022-04-08 ENCOUNTER — HOSPITAL ENCOUNTER (EMERGENCY)
Age: 72
Discharge: HOME | End: 2022-04-08
Payer: MEDICARE

## 2022-04-08 VITALS
WEIGHT: 163 LBS | OXYGEN SATURATION: 95 % | DIASTOLIC BLOOD PRESSURE: 82 MMHG | BODY MASS INDEX: 30.8 KG/M2 | SYSTOLIC BLOOD PRESSURE: 174 MMHG | RESPIRATION RATE: 16 BRPM | TEMPERATURE: 98.5 F | HEART RATE: 68 BPM

## 2022-04-08 DIAGNOSIS — S09.90XA CLOSED HEAD INJURY, INITIAL ENCOUNTER: ICD-10-CM

## 2022-04-08 DIAGNOSIS — S62.101A WRIST FRACTURE, CLOSED, RIGHT, INITIAL ENCOUNTER: Primary | ICD-10-CM

## 2022-04-08 DIAGNOSIS — S80.01XA CONTUSION OF RIGHT KNEE, INITIAL ENCOUNTER: ICD-10-CM

## 2022-04-08 LAB
ALBUMIN SERPL-MCNC: 4.3 G/DL (ref 3.5–4.6)
ALP BLD-CCNC: 46 U/L (ref 40–130)
ALT SERPL-CCNC: 22 U/L (ref 0–33)
ANION GAP SERPL CALCULATED.3IONS-SCNC: 17 MEQ/L (ref 9–15)
APTT: 22.9 SEC (ref 24.4–36.8)
AST SERPL-CCNC: 24 U/L (ref 0–35)
BASOPHILS ABSOLUTE: 0.1 K/UL (ref 0–0.2)
BASOPHILS RELATIVE PERCENT: 0.6 %
BILIRUB SERPL-MCNC: 0.4 MG/DL (ref 0.2–0.7)
BUN BLDV-MCNC: 17 MG/DL (ref 8–23)
CALCIUM SERPL-MCNC: 9.7 MG/DL (ref 8.5–9.9)
CHLORIDE BLD-SCNC: 98 MEQ/L (ref 95–107)
CO2: 25 MEQ/L (ref 20–31)
CREAT SERPL-MCNC: 0.51 MG/DL (ref 0.5–0.9)
EOSINOPHILS ABSOLUTE: 0.2 K/UL (ref 0–0.7)
EOSINOPHILS RELATIVE PERCENT: 2 %
ETHANOL PERCENT: NORMAL G/DL
ETHANOL: <10 MG/DL (ref 0–0.08)
GFR AFRICAN AMERICAN: >60
GFR NON-AFRICAN AMERICAN: >60
GLOBULIN: 2.8 G/DL (ref 2.3–3.5)
GLUCOSE BLD-MCNC: 111 MG/DL (ref 70–99)
HCT VFR BLD CALC: 40.5 % (ref 37–47)
HEMOGLOBIN: 13 G/DL (ref 12–16)
INR BLD: 1
LYMPHOCYTES ABSOLUTE: 1.5 K/UL (ref 1–4.8)
LYMPHOCYTES RELATIVE PERCENT: 14.6 %
MCH RBC QN AUTO: 28 PG (ref 27–31.3)
MCHC RBC AUTO-ENTMCNC: 32 % (ref 33–37)
MCV RBC AUTO: 87.7 FL (ref 82–100)
MONOCYTES ABSOLUTE: 0.8 K/UL (ref 0.2–0.8)
MONOCYTES RELATIVE PERCENT: 7.5 %
NEUTROPHILS ABSOLUTE: 7.6 K/UL (ref 1.4–6.5)
NEUTROPHILS RELATIVE PERCENT: 75.3 %
PDW BLD-RTO: 15 % (ref 11.5–14.5)
PLATELET # BLD: 182 K/UL (ref 130–400)
POTASSIUM SERPL-SCNC: 4.4 MEQ/L (ref 3.4–4.9)
PROTHROMBIN TIME: 13 SEC (ref 12.3–14.9)
RBC # BLD: 4.62 M/UL (ref 4.2–5.4)
SODIUM BLD-SCNC: 140 MEQ/L (ref 135–144)
TOTAL PROTEIN: 7.1 G/DL (ref 6.3–8)
WBC # BLD: 10.1 K/UL (ref 4.8–10.8)

## 2022-04-08 PROCEDURE — 72128 CT CHEST SPINE W/O DYE: CPT

## 2022-04-08 PROCEDURE — 72131 CT LUMBAR SPINE W/O DYE: CPT

## 2022-04-08 PROCEDURE — 29125 APPL SHORT ARM SPLINT STATIC: CPT

## 2022-04-08 PROCEDURE — 74177 CT ABD & PELVIS W/CONTRAST: CPT

## 2022-04-08 PROCEDURE — 85730 THROMBOPLASTIN TIME PARTIAL: CPT

## 2022-04-08 PROCEDURE — 72125 CT NECK SPINE W/O DYE: CPT

## 2022-04-08 PROCEDURE — 73564 X-RAY EXAM KNEE 4 OR MORE: CPT

## 2022-04-08 PROCEDURE — 73030 X-RAY EXAM OF SHOULDER: CPT

## 2022-04-08 PROCEDURE — 73100 X-RAY EXAM OF WRIST: CPT

## 2022-04-08 PROCEDURE — 85025 COMPLETE CBC W/AUTO DIFF WBC: CPT

## 2022-04-08 PROCEDURE — 73130 X-RAY EXAM OF HAND: CPT

## 2022-04-08 PROCEDURE — 73090 X-RAY EXAM OF FOREARM: CPT

## 2022-04-08 PROCEDURE — 80053 COMPREHEN METABOLIC PANEL: CPT

## 2022-04-08 PROCEDURE — 6360000004 HC RX CONTRAST MEDICATION: Performed by: PHYSICIAN ASSISTANT

## 2022-04-08 PROCEDURE — 73060 X-RAY EXAM OF HUMERUS: CPT

## 2022-04-08 PROCEDURE — 36415 COLL VENOUS BLD VENIPUNCTURE: CPT

## 2022-04-08 PROCEDURE — 70450 CT HEAD/BRAIN W/O DYE: CPT

## 2022-04-08 PROCEDURE — 82077 ASSAY SPEC XCP UR&BREATH IA: CPT

## 2022-04-08 PROCEDURE — 99285 EMERGENCY DEPT VISIT HI MDM: CPT

## 2022-04-08 PROCEDURE — 71260 CT THORAX DX C+: CPT

## 2022-04-08 PROCEDURE — 99283 EMERGENCY DEPT VISIT LOW MDM: CPT | Performed by: NURSE PRACTITIONER

## 2022-04-08 PROCEDURE — 6370000000 HC RX 637 (ALT 250 FOR IP): Performed by: PHYSICIAN ASSISTANT

## 2022-04-08 PROCEDURE — 85610 PROTHROMBIN TIME: CPT

## 2022-04-08 PROCEDURE — 93005 ELECTROCARDIOGRAM TRACING: CPT | Performed by: PHYSICIAN ASSISTANT

## 2022-04-08 RX ORDER — OXYCODONE HYDROCHLORIDE AND ACETAMINOPHEN 5; 325 MG/1; MG/1
1 TABLET ORAL EVERY 6 HOURS PRN
Qty: 12 TABLET | Refills: 0 | Status: SHIPPED | OUTPATIENT
Start: 2022-04-08 | End: 2022-04-11

## 2022-04-08 RX ORDER — OXYCODONE HYDROCHLORIDE AND ACETAMINOPHEN 5; 325 MG/1; MG/1
1 TABLET ORAL ONCE
Status: COMPLETED | OUTPATIENT
Start: 2022-04-08 | End: 2022-04-08

## 2022-04-08 RX ADMIN — OXYCODONE HYDROCHLORIDE AND ACETAMINOPHEN 1 TABLET: 5; 325 TABLET ORAL at 15:45

## 2022-04-08 RX ADMIN — IOPAMIDOL 100 ML: 612 INJECTION, SOLUTION INTRAVENOUS at 14:05

## 2022-04-08 ASSESSMENT — PAIN SCALES - GENERAL
PAINLEVEL_OUTOF10: 9
PAINLEVEL_OUTOF10: 10

## 2022-04-08 ASSESSMENT — ENCOUNTER SYMPTOMS
BACK PAIN: 1
COLOR CHANGE: 0
CONSTIPATION: 0
SORE THROAT: 0
EYE DISCHARGE: 0
SHORTNESS OF BREATH: 0
ABDOMINAL PAIN: 0
RHINORRHEA: 0
ABDOMINAL DISTENTION: 0

## 2022-04-08 ASSESSMENT — PAIN DESCRIPTION - PAIN TYPE: TYPE: ACUTE PAIN

## 2022-04-08 ASSESSMENT — PAIN - FUNCTIONAL ASSESSMENT: PAIN_FUNCTIONAL_ASSESSMENT: 0-10

## 2022-04-08 ASSESSMENT — PAIN DESCRIPTION - ORIENTATION: ORIENTATION: RIGHT

## 2022-04-08 NOTE — ED NOTES
Right volar wrist splint placed on pt per provided recommendation. Cap refill <3 secs.       Yessy Robles RN  04/08/22 2042

## 2022-04-08 NOTE — ED PROVIDER NOTES
[address] [facility] [place], [place] ED  eMERGENCY dEPARTMENT eNCOUnter      Pt Name: Jhonathan Dale  MRN: [id number]  [place] 1950  Date of evaluation: 4/8/2022  Provider: Ayaz Tomas PA-C    CHIEF COMPLAINT       Chief Complaint   Patient presents with    Fall     pt c/o falling 12 steps down, 0 loc         HISTORY OF PRESENT ILLNESS   (Location/Symptom, Timing/Onset,Context/Setting, Quality, Duration, Modifying Factors, Severity)  Note limiting factors. Jhonathan Dale is a 70 y.o. female who presents to the emergency department patient presents for short complaint of head pain, neck pain, back pain, right wrist pain, right knee pain, after falling down 12 stairs. Patient states that she tumbled headfirst on the stairs. There was no loss of consciousness, she was able to stand and ambulate after the incident occurred. Past medical history significant for coronary artery disease, dyslipidemia, TIAs, unstable angina, type 2 diabetes, anxiety. HPI    NursingNotes were reviewed. REVIEW OF SYSTEMS    (2-9 systems for level 4, 10 or more for level 5)     Review of Systems   Constitutional: Negative for activity change and appetite change. HENT: Negative for congestion, ear discharge, ear pain, nosebleeds, rhinorrhea and sore throat. Generalized head pain   Eyes: Negative for discharge. Respiratory: Negative for shortness of breath. Cardiovascular: Negative for chest pain, palpitations and leg swelling. Gastrointestinal: Negative for abdominal distention, abdominal pain and constipation. Genitourinary: Negative for difficulty urinating and dysuria. Musculoskeletal: Positive for back pain and neck pain. Negative for arthralgias. Right wrist and right knee pain   Skin: Negative for color change. Neurological: Negative for dizziness, tremors, syncope, weakness, numbness and headaches. Psychiatric/Behavioral: Negative for agitation and confusion.        Except as noted above the remainder of the review of systems was reviewed and negative. PAST MEDICAL HISTORY     Past Medical History:   Diagnosis Date    Anxiety     CAD (coronary artery disease)     Diabetes mellitus ([facility]     Dyslipidemia     FH: CAD (coronary artery disease)     History of TIA (transient ischemic attack)     HTN (hypertension)     EVITA (obstructive sleep apnea) 10/16/2020    Type 2 diabetes mellitus without complication ([facility] [address]     Unstable angina ([facility] [address] 12/13/12         SURGICALHISTORY       Past Surgical History:   Procedure Laterality Date    CORONARY ANGIOPLASTY  12/14/12    CORONARY ANGIOPLASTY WITH STENT PLACEMENT      DIAGNOSTIC CARDIAC CATH LAB PROCEDURE  12/14/12    HYSTERECTOMY  03/2018    TONSILLECTOMY           CURRENT MEDICATIONS       Previous Medications    ALPRAZOLAM (XANAX) 0.25 MG TABLET    Take 0.25 mg by mouth nightly as needed for Sleep. AMLODIPINE (NORVASC) 10 MG TABLET    Take 5 mg by mouth daily     ASPIRIN 81 MG CHEWABLE TABLET    Take 81 mg by mouth daily    ATORVASTATIN (LIPITOR) 20 MG TABLET    Take 20 mg by mouth daily    AZELASTINE (ASTELIN) 0.1 % NASAL SPRAY    1 spray by Nasal route 2 times daily Use in each nostril as directed    BENZONATATE (TESSALON PERLES) 100 MG CAPSULE    Take 1 capsule by mouth 3 times daily as needed for Cough    CLONIDINE (CATAPRES) 0.2 MG TABLET    Take 1 tablet by mouth 2 times daily Do not stop this medication suddenly.   Taper this medication off gradually if you want to discontinue    CLOPIDOGREL (PLAVIX) 75 MG TABLET    Take 1 tablet by mouth daily    CYANOCOBALAMIN (VITAMIN B-12) 2500 MCG SUBL    Place 2,500 mcg under the tongue daily    FERROUS SULFATE (IRON 325) 325 (65 FE) MG TABLET    Take 325 mg by mouth 2 times daily    FLUTICASONE (FLONASE) 50 MCG/ACT NASAL SPRAY    2 sprays by Nasal route every 12 hours for 15 days    FUROSEMIDE (LASIX) 20 MG TABLET    Take 1 tablet by mouth 2 times daily    HYDROCODONE-ACETAMINOPHEN (NORCO) 7.5-325 MG PER TABLET    Take 1 tablet by mouth every 8 hours as needed for Pain . ISOSORBIDE MONONITRATE (IMDUR) 60 MG EXTENDED RELEASE TABLET    Take 1 tablet by mouth daily    MECLIZINE (ANTIVERT) 25 MG TABLET    And every 6-8 hours as needed for dizziness    METFORMIN (GLUCOPHAGE) 500 MG TABLET    Take 1,000 mg by mouth 2 times daily (with meals)     METOPROLOL TARTRATE (LOPRESSOR) 25 MG TABLET    Take 1 tablet by mouth 2 times daily    NITROGLYCERIN (NITROSTAT) 0.4 MG SL TABLET    up to max of 3 total doses. If no relief after 1 dose, call 911. OMEPRAZOLE (PRILOSEC) 20 MG DELAYED RELEASE CAPSULE    Take 1 capsule by mouth Daily       ALLERGIES     Amoxicillin    FAMILY HISTORY       Family History   Problem Relation Age of Onset    Heart Disease Mother     Heart Disease Father     Heart Disease Sister     Heart Disease Brother           SOCIAL HISTORY       Social History     Socioeconomic History    Marital status:      Spouse name: None    Number of children: None    Years of education: None    Highest education level: None   Occupational History    None   Tobacco Use    Smoking status: Never Smoker    Smokeless tobacco: Never Used   Vaping Use    Vaping Use: Never used   Substance and Sexual Activity    Alcohol use: No     Alcohol/week: 0.0 standard drinks    Drug use: No    Sexual activity: None   Other Topics Concern    None   Social History Narrative    None     Social Determinants of Health     Financial Resource Strain:     Difficulty of Paying Living Expenses: Not on file   Food Insecurity:     Worried About Running Out of Food in the Last Year: Not on file    Shay of Food in the Last Year: Not on file   Transportation Needs:     Lack of Transportation (Medical): Not on file    Lack of Transportation (Non-Medical):  Not on file   Physical Activity:     Days of Exercise per Week: Not on file    Minutes of Exercise per Session: Not on file   Stress:     Feeling of Stress : Not on file   Social Connections:     Frequency of Communication with Friends and Family: Not on file    Frequency of Social Gatherings with Friends and Family: Not on file    Attends Orthodox Services: Not on file    Active Member of Clubs or Organizations: Not on file    Attends Club or Organization Meetings: Not on file    Marital Status: Not on file   Intimate Partner Violence:     Fear of Current or Ex-Partner: Not on file    Emotionally Abused: Not on file    Physically Abused: Not on file    Sexually Abused: Not on file   Housing Stability:     Unable to Pay for Housing in the Last Year: Not on file    Number of [place] in the Last Year: Not on file    Unstable Housing in the Last Year: Not on file       SCREENINGS    Christin Coma Scale  Eye Opening: Spontaneous  Best Verbal Response: Oriented  Best Motor Response: Obeys commands  Christin Coma Scale Score: 15 @FLOW([id number])@      PHYSICAL EXAM    (up to 7 for level 4, 8 or more for level 5)     ED Triage Vitals   BP Temp Temp Source Pulse Resp SpO2 Height Weight   04/08/22 1257 04/08/22 1256 04/08/22 1256 04/08/22 1256 04/08/22 1257 04/08/22 1257 -- --   (!) 162/80 98.5 °F (36.9 °C) Oral 93 17 97 %         Physical Exam  Vitals and nursing note reviewed. Constitutional:       General: She is not in acute distress. Appearance: She is well-developed. She is not ill-appearing, toxic-appearing or diaphoretic. HENT:      Head: Normocephalic. Comments: Head is atraumatic, no cut scrapes abrasions, no depressions, no deformity, no pain on palpation. Nose: Nose normal. No congestion. Mouth/Throat:      Mouth: Mucous membranes are moist.      Pharynx: No oropharyngeal exudate or posterior oropharyngeal erythema. Eyes:      Extraocular Movements: Extraocular movements intact. Conjunctiva/sclera: Conjunctivae normal.      Pupils: Pupils are equal, round, and reactive to light.       Comments: Pupils are equal round reactive to light at approximately a 3 mm, no nystagmus. Neck:      Vascular: No JVD. Trachea: No tracheal deviation. Comments: Patient has pain with movement of neck, patient in cervical collar by trauma services on patient arrival.  No subcutaneous air, no upper airway stridor, patient speaks in full sentences, no upper airway compromise. Cardiovascular:      Rate and Rhythm: Normal rate. Pulses: Normal pulses. Heart sounds: Normal heart sounds. No murmur heard. No friction rub. No gallop. Pulmonary:      Effort: Pulmonary effort is normal. No tachypnea, accessory muscle usage, respiratory distress or retractions. Breath sounds: No stridor. No wheezing, rhonchi or rales. Comments: Lung sounds are clear in all fields, there is no wheezes rales or rhonchi, no excess muscle use, retractions, room air saturations are at 97%. Flail segments, no paradoxical movement, no cut scrapes abrasions or bruising is noted. Chest:      Chest wall: No tenderness. Abdominal:      General: Abdomen is flat. Bowel sounds are normal. There is no distension or abdominal bruit. Palpations: Abdomen is soft. There is no shifting dullness, fluid wave, hepatomegaly, splenomegaly, mass or pulsatile mass. Tenderness: There is no abdominal tenderness. There is no right CVA tenderness, left CVA tenderness, guarding or rebound. Negative signs include Bingham's sign, Rovsing's sign and McBurney's sign. Comments: Abdomen soft nondistended nontender no guarding mass rebound, no CVA tenderness. Musculoskeletal:         General: No deformity. Arms:       Cervical back: Normal range of motion and neck supple. No rigidity.         Legs:       Comments: Patient has no tenderness on palpation to cervical spine, thoracic pain, lumbar spine, sacral area, pelvis is stable there is no crepitus or instability, there is no shortening or rotation of bilateral lower extremities, no femoral pain, mild nurse on palpation to right knee. No crepitus or deformities noted no instability. Left knee shows no visible signs of injuries, no tib-fib, no foot or ankle pain, patient moves both lower extremities well without any increased pain or facial grimace. No pain across shoulders, patient does have some tenderness to proximal humerus on the right side, elbow, ulna radius, wrist hand and fingers. There is edema, deformity noted to right distal wrist.  Patient moves all fingers well, capillary fill less than 3 seconds, radial pulses are present both right and left. Upper and lower extremities are neurovascular intact. Skin:     General: Skin is warm and dry. Capillary Refill: Capillary refill takes less than 2 seconds. Coloration: Skin is not jaundiced. Neurological:      General: No focal deficit present. Mental Status: She is alert and oriented to person, place, and time. Mental status is at baseline. Cranial Nerves: No cranial nerve deficit. Sensory: No sensory deficit. Motor: No weakness. Coordination: Coordination normal.   Psychiatric:         Mood and Affect: Mood normal.         DIAGNOSTIC RESULTS     EKG: All EKG's are interpreted by the Emergency Department Physician who either signs or Co-signsthis chart in the absence of a cardiologist.    EKG shows normal sinus rhythm at 74 bpm there is no acute ST segment abnormality no ventricular ectopy  ms    RADIOLOGY:   Non-plain filmimages such as CT, Ultrasound and MRI are read by the radiologist. Plain radiographic images are visualized and preliminarily interpreted by the emergency physician with the below findings:    xray of the right knee shows no acute fracture or subluxation    X-ray of the right wrist shows distal radius fracture    X-ray of the ulna radius right side shows no acute fracture.     X-ray of the right humerus shows no acute fracture or subluxation        Interpretation per the Radiologist below, if available at the time ofthis note:    XR KNEE RIGHT (MIN 4 VIEWS)   Final Result      NO DISPLACED FRACTURE OR SIGNIFICANT POSTTRAUMATIC COMPLICATION IDENTIFIED. XR SHOULDER RIGHT (MIN 2 VIEWS)   Final Result      NO DISPLACED FRACTURE OR SIGNIFICANT POSTTRAUMATIC COMPLICATION IDENTIFIED. XR HUMERUS RIGHT (MIN 2 VIEWS)   Final Result   NEGATIVE RIGHT HUMERUS      XR WRIST RIGHT (2 VIEWS)   Final Result      NONDISPLACED RADIAL STYLOID FRACTURE. XR HAND RIGHT (MIN 3 VIEWS)   Final Result   NEGATIVE RIGHT HAND      XR RADIUS ULNA RIGHT (2 VIEWS)   Final Result      NONDISPLACED RADIAL STYLOID FRACTURE. CT Head WO Contrast   Final Result   FINAL IMPRESSION:      NO ACUTE INTRACRANIAL PROCESS, FRACTURE, OR EVIDENCE OF CERVICAL SPINE INJURY IDENTIFIED. CT CERVICAL SPINE WO CONTRAST   Final Result   FINAL IMPRESSION:      NO ACUTE INTRACRANIAL PROCESS, FRACTURE, OR EVIDENCE OF CERVICAL SPINE INJURY IDENTIFIED. CT THORACIC SPINE WO CONTRAST   Final Result   FINAL IMPRESSION:       MILD LEFT FLANK AND SUBCUTANEOUS ECCHYMOSIS. NO DISPLACED FRACTURE OR OTHER SIGNIFICANT POSTTRAUMATIC COMPLICATION IDENTIFIED. CT LUMBAR SPINE WO CONTRAST   Final Result   FINAL IMPRESSION:       MILD LEFT FLANK AND SUBCUTANEOUS ECCHYMOSIS. NO DISPLACED FRACTURE OR OTHER SIGNIFICANT POSTTRAUMATIC COMPLICATION IDENTIFIED. CT ABDOMEN PELVIS W IV CONTRAST Additional Contrast? None   Final Result   FINAL IMPRESSION:       MILD LEFT FLANK AND SUBCUTANEOUS ECCHYMOSIS. NO DISPLACED FRACTURE OR OTHER SIGNIFICANT POSTTRAUMATIC COMPLICATION IDENTIFIED. CT CHEST W CONTRAST   Final Result   FINAL IMPRESSION:       MILD LEFT FLANK AND SUBCUTANEOUS ECCHYMOSIS. NO DISPLACED FRACTURE OR OTHER SIGNIFICANT POSTTRAUMATIC COMPLICATION IDENTIFIED.                   ED BEDSIDE ULTRASOUND:   Performed by ED Physician - none    LABS:  Labs Reviewed   COMPREHENSIVE METABOLIC PANEL - Abnormal; Notable for the following components:       Result Value    Anion Gap 17 (*)     Glucose 111 (*)     All other components within normal limits   CBC WITH AUTO DIFFERENTIAL - Abnormal; Notable for the following components:    MCHC 32.0 (*)     RDW 15.0 (*)     Neutrophils Absolute 7.6 (*)     All other components within normal limits   APTT - Abnormal; Notable for the following components:    aPTT 22.9 (*)     All other components within normal limits   ETHANOL   PROTIME-INR   URINE DRUG SCREEN   URINALYSIS WITH REFLEX TO CULTURE       All other labs were within normal range or not returned as of this dictation. EMERGENCY DEPARTMENT COURSE and DIFFERENTIAL DIAGNOSIS/MDM:   Vitals:    Vitals:    04/08/22 1256 04/08/22 1257 04/08/22 1525 04/08/22 1539   BP:  (!) 162/80 (!) 179/82    Pulse: 93 62 70    Resp:  17 16    Temp: 98.5 °F (36.9 °C) 98.5 °F (36.9 °C)     TempSrc: Oral      SpO2:  97% 95%    Weight:    163 lb (73.9 kg)        MDM  Number of Diagnoses or Management Options  Closed head injury, initial encounter  Contusion of right knee, initial encounter  Wrist fracture, closed, right, initial encounter  Diagnosis management comments: Pt had presented to the emergency department after a fall down approximately 12 stairs causing injury to her head, right arm, and right knee. She was seen by myself, as well as by trauma services. A CT scan of the head, cervical spine, thoracic spine, lumbar spine, chest, abdomen and pelvis were completed during her visit with no specific acute abnormality. X-ray of the right arm shows fracture to the distal radius. Patient was placed in a volar splint, and a sling, and given referral to orthopedic services. X-ray of the right knee shows no acute fracture or subluxation. Patient was medicated for pain while in the ED is much more comfortable at this time.   I did discuss with her close follow-up with orthopedics on time of discharge, she was given a prescription for Percocet for pain control, and advised if she has any worsening or changes symptoms, she should return to the ED. Patient was cleared by trauma services. CRITICAL CARE TIME   Total Critical Care time was 0 minutes, excluding separately reportableprocedures. There was a high probability of clinicallysignificant/life threatening deterioration in the patient's condition which required my urgent intervention. CONSULTS:  None    PROCEDURES:  Unless otherwise noted below, none     Procedures    FINAL IMPRESSION      1. Wrist fracture, closed, right, initial encounter    2. Closed head injury, initial encounter    3. Contusion of right knee, initial encounter          DISPOSITION/PLAN   DISPOSITION Decision To Discharge 04/08/2022 04:19:21 PM      PATIENT REFERRED TO:  Turner Urban MD  [address] [facility] [address]  [phone]    In 3 days      [facility] [place] [id number] [phone]  In 2 days        DISCHARGE MEDICATIONS:  New Prescriptions    OXYCODONE-ACETAMINOPHEN (PERCOCET) 5-325 MG PER TABLET    Take 1 tablet by mouth every 6 hours as needed for Pain for up to 3 days. Intended supply: 3 days.  Take lowest dose possible to manage pain          (Please note that portions of this note were completed with a voice recognition program.  Efforts were made to edit the dictations but occasionally words are mis-transcribed.)    Monica Carver PA-C (electronically signed)  Attending Emergency Physician         Monica Carver PA-C  04/08/22 [id number]

## 2022-04-08 NOTE — H&P
Trauma Consult / H & P Note    Reason for Consult: Trauma  Consulting Provider: No att. providers found      BASIC INJURY INFORMATION:  Level of activation: CAT 2  Mode of transport: brought in by friend  Mechanism of injury:  fall down 12 stairs  Complicating features: NA  Protective measures: NA    HISTORY OF PRESENT INJURY:   Renuka Zarate is a 70 y.o. female with a PMHx of anxiety, CAD (s/p stenting), DM2, hyperlipidemia, TIA, HTN, and EVITA presenting to the ED following Level 2 Trauma activation. Patient is primarily Romansh-speaking with friend at bedside to assist in communication. Patient reports she fell down 12 stairs head-first at roughly 9:30 this morning. Positive head strike, denies loss of consciousness. Patient on ASA/Plavix, no other use of anticoagulants. Reports arm, chest, hip, and knee pain to entire right side secondary to fall. Patient also reports history of bilateral hip replacements, for which she described burning at R hip at time of fall. States family members helped her to her feet following the fall. C collar placed in ED. PRIMARY SURVEY:  Airway: Intact  Breathing: Normal   Breath Sounds: Breath Sounds Equal Bilaterally  Circulation:    Pulses: Normal   Skin: Normal skin color, texture and turgor. Disability:   Pupils: PERRL   GCS:    Best Eyes: 4    Best Verbal: 5    Best Motor: 6    Total: 15      Vitals:   Vitals:    04/08/22 1257 04/08/22 1525 04/08/22 1539 04/08/22 1646   BP: (!) 162/80 (!) 179/82  (!) 174/82   Pulse: 62 70  68   Resp: 17 16     Temp: 98.5 °F (36.9 °C)      TempSrc:       SpO2: 97% 95%     Weight:   163 lb (73.9 kg)          SECONDARY SURVEY:  Neurologic: Alert and Oriented, Appropriate, Moves all Extremities, Strength Symmetrical and No Sensory Deficits   HEENT:   Head:  Additional Findings: Cephalohematoma to R forehead   Eyes: PERRL, Corneas/Conjunctiva without lesions and EOM intact   Ears: No Hemotympanum   Nose: Septum Midline, No crepitus with motion; Throat: Additional Findings: Ecchymosis noted bilaterally on tongue    Neck: No midline tenderness  Pulmonary: External exam: no crepitus or pain with palpation, no contusions or abrasions; and Lung exam: breath sounds clear, no wheezes, no rales  Cardiovascular:    Pulses: Bilateral radial, femoral, DP and PT pulses are normal;  Abdomen: Appearance: Non-distended, No scars, lacerations, contusions; and Palpation: no tenderness   Rectal: Not performed  Pelvis/Perineum: Pelvis is stable to palpation; and No blood noted at the urethral meatus;  Musculoskeletal:    Back/Spine: Thoracolumbar spinal column tender to palpation; Extremities: Arm/shoulder R: Abnormal, Forearm/Elbow R: Abnormal, Thigh/Hip R: Abnormal and Leg/Knee R: Abnormal. Reports pain in R shoulder/elbow/wrist with passive range of motion. R wrist and fingers edematous and ecchymotic. PAST MEDICAL HISTORY:  Past Medical History:   Diagnosis Date    Anxiety     CAD (coronary artery disease)     Diabetes mellitus ([facility] [address]     Dyslipidemia     FH: CAD (coronary artery disease)     History of TIA (transient ischemic attack)     HTN (hypertension)     EVITA (obstructive sleep apnea) 10/16/2020    Type 2 diabetes mellitus without complication ([facility] [address]     Unstable angina ([facility] [address] 12/13/12       PAST SURGICAL HISTORY:  Past Surgical History:   Procedure Laterality Date    CORONARY ANGIOPLASTY  12/14/12    CORONARY ANGIOPLASTY WITH STENT PLACEMENT      DIAGNOSTIC CARDIAC CATH LAB PROCEDURE  12/14/12    HYSTERECTOMY  03/2018    TONSILLECTOMY         PRE-ADMISSION MEDICATIONS:   Prior to Admission medications    Medication Sig Start Date End Date Taking? Authorizing Provider   oxyCODONE-acetaminophen (PERCOCET) 5-325 MG per tablet Take 1 tablet by mouth every 6 hours as needed for Pain for up to 3 days. Intended supply: 3 days.  Take lowest dose possible to manage pain 4/8/22 4/11/22 Yes Juan Pablo Vernon PA-C   benzonatate (TESSALON PERLES) 100 MG capsule Take 1 capsule by mouth 3 times daily as needed for Cough 8/26/21   Ivory Santa APRN - CNP   cloNIDine (CATAPRES) 0.2 MG tablet Take 1 tablet by mouth 2 times daily Do not stop this medication suddenly. Taper this medication off gradually if you want to discontinue 6/4/21   Deja Lepe MD   furosemide (LASIX) 20 MG tablet Take 1 tablet by mouth 2 times daily 6/4/21   Deja Lepe MD   metoprolol tartrate (LOPRESSOR) 25 MG tablet Take 1 tablet by mouth 2 times daily 6/4/21   Deja Lepe MD   omeprazole (PRILOSEC) 20 MG delayed release capsule Take 1 capsule by mouth Daily 6/4/21   Deja Lepe MD   fluticasone Joyce Milton) 50 MCG/ACT nasal spray 2 sprays by Nasal route every 12 hours for 15 days 6/4/21 6/19/21  Deja Lepe MD   azelastine (ASTELIN) 0.1 % nasal spray 1 spray by Nasal route 2 times daily Use in each nostril as directed 6/4/21   Deja Lepe MD   atorvastatin (LIPITOR) 20 MG tablet Take 20 mg by mouth daily    Historical Provider, MD   Cyanocobalamin (VITAMIN B-12) 2500 MCG SUBL Place 2,500 mcg under the tongue daily    Historical Provider, MD   ferrous sulfate (IRON 325) 325 (65 Fe) MG tablet Take 325 mg by mouth 2 times daily    Historical Provider, MD   ALPRAZolam (XANAX) 0.25 MG tablet Take 0.25 mg by mouth nightly as needed for Sleep. Historical Provider, MD   clopidogrel (PLAVIX) 75 MG tablet Take 1 tablet by mouth daily 5/24/21   [employer] Holiday, DO   isosorbide mononitrate (IMDUR) 60 MG extended release tablet Take 1 tablet by mouth daily 10/16/20   [employer] Holiday, DO   nitroGLYCERIN (NITROSTAT) 0.4 MG SL tablet up to max of 3 total doses.  If no relief after 1 dose, call 911. 8/14/20   [employer] Holiday, DO   meclizine (ANTIVERT) 25 MG tablet And every 6-8 hours as needed for dizziness 4/20/19   Gulshan Rivas MD   aspirin 81 MG chewable tablet Take 81 mg by mouth daily    Historical Provider, MD   HYDROcodone-acetaminophen (NORCO) 7.5-325 MG per tablet Take 1 tablet by mouth every 8 hours as needed for Pain . 12/13/17   Giovany Schafer PA-C   amLODIPine (NORVASC) 10 MG tablet Take 5 mg by mouth daily     Historical Provider, MD   metformin (GLUCOPHAGE) 500 MG tablet Take 1,000 mg by mouth 2 times daily (with meals)     Historical Provider, MD       ALLERGIES:  Amoxicillin    SOCIAL HISTORY:   Social History     Socioeconomic History    Marital status:      Spouse name: None    Number of children: None    Years of education: None    Highest education level: None   Occupational History    None   Tobacco Use    Smoking status: Never Smoker    Smokeless tobacco: Never Used   Vaping Use    Vaping Use: Never used   Substance and Sexual Activity    Alcohol use: No     Alcohol/week: 0.0 standard drinks    Drug use: No    Sexual activity: None   Other Topics Concern    None   Social History Narrative    None     Social Determinants of Health     Financial Resource Strain:     Difficulty of Paying Living Expenses: Not on file   Food Insecurity:     Worried About Running Out of Food in the Last Year: Not on file    Shay of Food in the Last Year: Not on file   Transportation Needs:     Lack of Transportation (Medical): Not on file    Lack of Transportation (Non-Medical):  Not on file   Physical Activity:     Days of Exercise per Week: Not on file    Minutes of Exercise per Session: Not on file   Stress:     Feeling of Stress : Not on file   Social Connections:     Frequency of Communication with Friends and Family: Not on file    Frequency of Social Gatherings with Friends and Family: Not on file    Attends Mu-ism Services: Not on file    Active Member of Clubs or Organizations: Not on file    Attends Club or Organization Meetings: Not on file    Marital Status: Not on file   Intimate Partner Violence:     Fear of Current or Ex-Partner: Not on file    Emotionally Abused: Not on file    Physically Abused: Not on file    Sexually Abused: Not on file   Housing Stability:     Unable to Pay for Housing in the Last Year: Not on file    Number of Places Lived in the Last Year: Not on file    Unstable Housing in the Last Year: Not on file       FAMILY HISTORY:  Family History   Problem Relation Age of Onset    Heart Disease Mother     Heart Disease Father     Heart Disease Sister     Heart Disease Brother            REVIEW OF SYSTEMS:  Constitutional: Negative  HENT: Positive for R forehead swelling. Negative for epistaxis. Eyes/Ears: Negative for vision/hearing changes. Respiratory: Negative for shortness of breath, difficulty breathing  Cardiovascular: Negative  Gastrointestinal: Negative for abdominal distention or abdominal pain. Genitourinary: Negative for hematuria  Musculoskeletal: Positive for RUE and R hip pain. RCW pain. Knee pain and RUE pain. Neurological: Negative for dizziness, weakness and light-headedness. Except as noted above, the remainder of the review of systems was reviewed and negative.      BASIC LABS:   CBC with Differential:    Lab Results   Component Value Date    WBC 10.1 04/08/2022    RBC 4.62 04/08/2022    RBC 4.64 03/22/2012    HGB 13.0 04/08/2022    HCT 40.5 04/08/2022     04/08/2022    MCV 87.7 04/08/2022    MCH 28.0 04/08/2022    MCHC 32.0 04/08/2022    RDW 15.0 04/08/2022    LYMPHOPCT 14.6 04/08/2022    MONOPCT 7.5 04/08/2022    EOSPCT 3.5 03/22/2012    BASOPCT 0.6 04/08/2022    MONOSABS 0.8 04/08/2022    LYMPHSABS 1.5 04/08/2022    EOSABS 0.2 04/08/2022    BASOSABS 0.1 04/08/2022     CMP:   Lab Results   Component Value Date     04/08/2022    K 4.4 04/08/2022    CL 98 04/08/2022    CO2 25 04/08/2022    BUN 17 04/08/2022    CREATININE 0.51 04/08/2022    GLUCOSE 111 (H) 04/08/2022    CALCIUM 9.7 04/08/2022    PROT 7.1 04/08/2022    LABALBU 4.3 04/08/2022    BILITOT 0.4 04/08/2022    ALKPHOS 46 04/08/2022    AST 24 04/08/2022    ALT 22 04/08/2022    LABGLOM >60.0 04/08/2022    GFRAA >60.0 04/08/2022    GLOB 2.8 04/08/2022     Magnesium:   Lab Results   Component Value Date    MG 1.6 06/04/2021    MG 1.7 12/06/2011     Troponin:   Lab Results   Component Value Date    TROPONINI <0.010 11/20/2021     PT/INR:   Recent Labs     04/08/22  1330   PROTIME 13.0   INR 1.0     APTT:   Recent Labs     04/08/22  1330   APTT 22.9*     EtOH:   Lab Results   Component Value Date    ETOH <10 04/08/2022       RADIOLOGY: (Personally reviewed)    CT Head/C-spine: NO ACUTE INTRACRANIAL PROCESS, FRACTURE, OR EVIDENCE OF CERVICAL SPINE INJURY IDENTIFIED    CT T-Spine: There is no fracture, dislocation, or acute paraspinous soft tissue abnormalities identified. Mild to moderate degenerative changes appear similar to the prior chest CTA. CT L-Spine: There is no fracture, dislocation, or acute paraspinous soft tissue abnormalities identified. Mild to moderate degenerative changes with slight anterolisthesis of L4 over L5 and retrolisthesis of L5 are again noted. CT Chest: There are no displaced fractures, significant hematoma, pulmonary contusion, pneumothorax, pleural or pericardial effusion, evidence of great vessel injury, or significant change from 2/12/2014 identified. CT Abdomen/Pelvis: Mild ill-defined edema within the subcutaneous fat of the right flank and hip is consistent with posttraumatic ecchymosis. Since the prior study, bilateral total hip arthroplasties have been placed, which appear otherwise unremarkable. There is no organized hematoma, evidence of solid organ injury, displaced fractures, free fluid, or other significant changes from 2/14/2014 identified      Additional plain films:  XR right hand: No fracture, dislocation, bone lesion. XR right radius: A nondisplaced intra-articular fracture is present of the radial styloid  XR right wrist: NONDISPLACED RADIAL STYLOID FRACTURE. XR right humerus: No fracture, dislocation, bone lesion.   XR right shoulder: NO DISPLACED FRACTURE OR SIGNIFICANT POSTTRAUMATIC COMPLICATION IDENTIFIED  XR right knee: There is no fracture, dislocation,significant joint effusion, radiodense foreign bodies, worrisome bone destruction, or other acute findings identified. Mild tricompartmental osteoarthritic changes and patellar enthesopathy is again noted        ASSESSMENT:  Siena Thao is a 70 y.o. female with PMH of anxiety, CAD (s/p stenting), DM2, hyperlipidemia, TIA, HTN, and EVITA presenting as a Category 2 trauma following fall down 12 stairs. Positive head strike, use of ASA/Plavix. No LOC. Patient HDS and able to ambulate safely at baseline. Trauma workup found non-displaced right radial styloid fracture, NV exam intact. PLAN:  - RUE splinted per ED staff and patient provided with splint and sling, with plan for ED to arrange outpatient follow-up with Orthopedics  - No injuries that require emergent surgery or admission to the Trauma service. Trauma team will sign off at this time, please reconsult as needed. NIKKI Hebert - NP  Trauma/Critical Care/General Surgery  [phone] ([phone]-1X)  [phone]     This patient's plan of care was discussed and made in collaboration with Trauma Attending physician, Caryn Eckert MD.    Teaching Physician Note:  I saw and evaluated the patient. I personally obtained the key and critical portions of the history and physical exam.  I reviewed the GUZMAN's documentation and discussed the patient with the GUZMAN. I agree with the GUZMAN's medical decision making as documented in the GUZMAN note.       Caryn Eckert MD

## 2022-04-08 NOTE — ED TRIAGE NOTES
Pt c/o falling down 12 steps today at 0930 am, pt walked to er with friend. pt denies loc, pt c/o rt sided body pain 9/10, pt states she lost balance and went down the steps rolling head first. Bruises and bump noted to rt side of forehead, 0 active bleeding noted. Pt a&ox4, skin w/d/tan, pulses palp. Pt denies dizziness, 0 n&v, pt has bite marks to left and right side of tongue. Speech clear. Pt states she feels sightly sob if takes deep breath.

## 2022-04-09 LAB
GFR AFRICAN AMERICAN: >60
GFR NON-AFRICAN AMERICAN: >60
PERFORMED ON: NORMAL
POC CREATININE: 0.6 MG/DL (ref 0.6–1.2)
POC SAMPLE TYPE: NORMAL

## 2022-04-11 LAB
EKG ATRIAL RATE: 74 BPM
EKG P AXIS: 70 DEGREES
EKG P-R INTERVAL: 152 MS
EKG Q-T INTERVAL: 416 MS
EKG QRS DURATION: 92 MS
EKG QTC CALCULATION (BAZETT): 461 MS
EKG R AXIS: 11 DEGREES
EKG T AXIS: 8 DEGREES
EKG VENTRICULAR RATE: 74 BPM

## 2022-04-12 ENCOUNTER — OFFICE VISIT (OUTPATIENT)
Dept: ORTHOPEDIC SURGERY | Age: 72
End: 2022-04-12
Payer: MEDICARE

## 2022-04-12 VITALS
TEMPERATURE: 97 F | OXYGEN SATURATION: 96 % | BODY MASS INDEX: 30.78 KG/M2 | HEIGHT: 61 IN | HEART RATE: 61 BPM | WEIGHT: 163 LBS

## 2022-04-12 DIAGNOSIS — S52.514A NONDISPLACED FRACTURE OF RIGHT RADIAL STYLOID PROCESS, INITIAL ENCOUNTER FOR CLOSED FRACTURE: Primary | ICD-10-CM

## 2022-04-12 PROCEDURE — 3017F COLORECTAL CA SCREEN DOC REV: CPT | Performed by: ORTHOPAEDIC SURGERY

## 2022-04-12 PROCEDURE — 1123F ACP DISCUSS/DSCN MKR DOCD: CPT | Performed by: ORTHOPAEDIC SURGERY

## 2022-04-12 PROCEDURE — 4040F PNEUMOC VAC/ADMIN/RCVD: CPT | Performed by: ORTHOPAEDIC SURGERY

## 2022-04-12 PROCEDURE — G8400 PT W/DXA NO RESULTS DOC: HCPCS | Performed by: ORTHOPAEDIC SURGERY

## 2022-04-12 PROCEDURE — G8427 DOCREV CUR MEDS BY ELIG CLIN: HCPCS | Performed by: ORTHOPAEDIC SURGERY

## 2022-04-12 PROCEDURE — 1036F TOBACCO NON-USER: CPT | Performed by: ORTHOPAEDIC SURGERY

## 2022-04-12 PROCEDURE — 1090F PRES/ABSN URINE INCON ASSESS: CPT | Performed by: ORTHOPAEDIC SURGERY

## 2022-04-12 PROCEDURE — 99203 OFFICE O/P NEW LOW 30 MIN: CPT | Performed by: ORTHOPAEDIC SURGERY

## 2022-04-12 PROCEDURE — 25600 CLTX DST RDL FX/EPHYS SEP WO: CPT | Performed by: ORTHOPAEDIC SURGERY

## 2022-04-12 PROCEDURE — G8417 CALC BMI ABV UP PARAM F/U: HCPCS | Performed by: ORTHOPAEDIC SURGERY

## 2022-04-12 NOTE — PATIENT INSTRUCTIONS
Diagnosis:  -Well aligned fracture of right wrist (radial styloid)    Recommendations:  -You have been placed in a fiberglass cast for right hand and wrist today. Cast will remain in place for approximately 4 weeks.-Please keep the cast clean and dry--May shower but will need to use cast cover (available from [facility]). -No lifting over 2 pounds with right hand. No pushing or pulling with right hand.  -Keep right hand and wrist elevated above the heart with 2-3 pillows when seated or lying down, to control inflammation/swelling under the cast.  -May take acetaminophen (Tylenol) 650 mg by mouth every 6 hours as needed to control pain. Procedures:  -Placement of right short arm cast.     Follow-up:  -We will need to be seen by Honey James PA-C (Dr. Darylene Barn office) on 4/26 for reassessment of right wrist fracture. We will update x-rays of right wrist in the cast at that visit.

## 2022-04-12 NOTE — PROGRESS NOTES
Subjective:      Patient ID: Rangel Nuno is a 70 y.o. female who presents today for:  Chief Complaint   Patient presents with   Critical access hospital ED Follow-up     Pt states she fell on Friday morning she did go to the ER right after Xrays were taking stating she has A nondisplaced intra-articular fracture is present of the radial styloid     HPI:    The patient is a pleasant 77-year-old right-hand-dominant female who presents for evaluation of right radial styloid fracture sustained 4 days ago as a result of a fall at the bottom of a flight of stairs in the basement. Minerva Nancy onto outstretched wrist.  Presented to emergency department and was placed in a provisional splint given radiographic evidence of nondisplaced radial styloid fracture. She denies any numbness or tingling. No loss of consciousness. Does have contusions over the right side of the forehead but no other injuries. Currently rates pain 6/10, described as sharp, aggravated by attempts at range of motion. Past Medical History:   Diagnosis Date    Anxiety     CAD (coronary artery disease)     Diabetes mellitus ([address]     Dyslipidemia     FH: CAD (coronary artery disease)     History of TIA (transient ischemic attack)     HTN (hypertension)     EVITA (obstructive sleep apnea) 10/16/2020    Type 2 diabetes mellitus without complication ([address]     Unstable angina ([address] 12/13/12     Past Surgical History:   Procedure Laterality Date    CORONARY ANGIOPLASTY  12/14/12    CORONARY ANGIOPLASTY WITH STENT PLACEMENT      DIAGNOSTIC CARDIAC CATH LAB PROCEDURE  12/14/12    HYSTERECTOMY  03/2018    TONSILLECTOMY       Social History     Socioeconomic History    Marital status:       Spouse name: Not on file    Number of children: Not on file    Years of education: Not on file    Highest education level: Not on file   Occupational History    Not on file   Tobacco Use    Smoking status: Never Smoker    Smokeless tobacco: Never Used   Vaping Use    Vaping Use: Never used   Substance and Sexual Activity    Alcohol use: No     Alcohol/week: 0.0 standard drinks    Drug use: No    Sexual activity: Not on file   Other Topics Concern    Not on file   Social History Narrative    Not on file     Social Determinants of Health     Financial Resource Strain:     Difficulty of Paying Living Expenses: Not on file   Food Insecurity:     Worried About Running Out of Food in the Last Year: Not on file    Shay of Food in the Last Year: Not on file   Transportation Needs:     Lack of Transportation (Medical): Not on file    Lack of Transportation (Non-Medical): Not on file   Physical Activity:     Days of Exercise per Week: Not on file    Minutes of Exercise per Session: Not on file   Stress:     Feeling of Stress : Not on file   Social Connections:     Frequency of Communication with Friends and Family: Not on file    Frequency of Social Gatherings with Friends and Family: Not on file    Attends Roman [employer] Services: Not on file    Active Member of [address] [place] [address] [employer] or Organizations: Not on file    Attends Club or Organization Meetings: Not on file    Marital Status: Not on file   Intimate Partner Violence:     Fear of Current or Ex-Partner: Not on file    Emotionally Abused: Not on file    Physically Abused: Not on file    Sexually Abused: Not on file   Housing Stability:     Unable to Pay for Housing in the Last Year: Not on file    Number of [place] in the Last Year: Not on file    Unstable Housing in the Last Year: Not on file     Family History   Problem Relation Age of Onset    Heart Disease Mother     Heart Disease Father     Heart Disease Sister     Heart Disease Brother      Allergies   Allergen Reactions    Amoxicillin      Current Outpatient Medications on File Prior to Visit   Medication Sig Dispense Refill    cloNIDine (CATAPRES) 0.2 MG tablet Take 1 tablet by mouth 2 times daily Do not stop this medication suddenly.   Taper this medication off gradually if you want to discontinue 60 tablet 0    furosemide (LASIX) 20 MG tablet Take 1 tablet by mouth 2 times daily 4 tablet 0    metoprolol tartrate (LOPRESSOR) 25 MG tablet Take 1 tablet by mouth 2 times daily 60 tablet 3    omeprazole (PRILOSEC) 20 MG delayed release capsule Take 1 capsule by mouth Daily 30 capsule 3    atorvastatin (LIPITOR) 20 MG tablet Take 20 mg by mouth daily      Cyanocobalamin (VITAMIN B-12) 2500 MCG SUBL Place 2,500 mcg under the tongue daily      ferrous sulfate (IRON 325) 325 (65 Fe) MG tablet Take 325 mg by mouth 2 times daily      ALPRAZolam (XANAX) 0.25 MG tablet Take 0.25 mg by mouth nightly as needed for Sleep.  clopidogrel (PLAVIX) 75 MG tablet Take 1 tablet by mouth daily 30 tablet 0    isosorbide mononitrate (IMDUR) 60 MG extended release tablet Take 1 tablet by mouth daily 30 tablet 6    nitroGLYCERIN (NITROSTAT) 0.4 MG SL tablet up to max of 3 total doses. If no relief after 1 dose, call 911. 25 tablet 3    meclizine (ANTIVERT) 25 MG tablet And every 6-8 hours as needed for dizziness 20 tablet 0    aspirin 81 MG chewable tablet Take 81 mg by mouth daily      HYDROcodone-acetaminophen (NORCO) 7.5-325 MG per tablet Take 1 tablet by mouth every 8 hours as needed for Pain .  12 tablet 0    amLODIPine (NORVASC) 10 MG tablet Take 5 mg by mouth daily       metformin (GLUCOPHAGE) 500 MG tablet Take 1,000 mg by mouth 2 times daily (with meals)       benzonatate (TESSALON PERLES) 100 MG capsule Take 1 capsule by mouth 3 times daily as needed for Cough (Patient not taking: Reported on 4/12/2022) 20 capsule 0    fluticasone (FLONASE) 50 MCG/ACT nasal spray 2 sprays by Nasal route every 12 hours for 15 days 1 Bottle 0    azelastine (ASTELIN) 0.1 % nasal spray 1 spray by Nasal route 2 times daily Use in each nostril as directed (Patient not taking: Reported on 4/12/2022) 1 Bottle 3     No current facility-administered medications on file prior to visit. Acute and Chronic Pain Monitoring:   RX Monitoring 12/13/2017   Attestation The Prescription Monitoring Report for this patient was reviewed today. Periodic Controlled Substance Monitoring No signs of potential drug abuse or diversion identified. Objective--Physical Examination:     Pulse 61   Temp 97 °F (36.1 °C) (Temporal)   Ht 5' 1\" (1.549 m)   Wt 163 lb (73.9 kg)   SpO2 96%   BMI 30.80 kg/m²     Physical Examination:  Pleasant 66-year-old female in mild to moderate distress, alert and cooperative. Examination of the right upper extremity reveals painless shoulder and elbow range of motion. Focused examination of the right forearm reveals that she is nontender to palpation along the interosseous membrane of the forearm. She is tender to palpation over the radial aspect of the wrist with trace swelling in that area but no visible contusion. Nontender over the DRUJ and no discomfort over the ulnar styloid. No snuffbox tenderness, negative scaphoid shift test.   strength, finger abduction, EPL strength are 4+/5. Sensory intact light touch in the radial, median, ulnar nerve distributions. Radial pulse 2+ capillary refill less than 2 seconds. Compartments of the forearm and hand are supple. Radiographs and Laboratory Studies:     Diagnostic Imaging Studies:    I have independently reviewed radiographs of the right hand, wrist, and forearm obtained on 4/8/2022. Demonstrate subtle, nondisplaced and transverse fracture of the right radial styloid. This is best evident on magnified images from the forearm films. DRUJ is intact. No other fractures noted. Laboratory Studies:   Lab Results   Component Value Date    WBC 10.1 04/08/2022    HGB 13.0 04/08/2022    HCT 40.5 04/08/2022    MCV 87.7 04/08/2022     04/08/2022     No results found for: SEDRATE  No results found for: CRP    Assessment / Plan:      Diagnosis Orders   1.  Nondisplaced fracture of right radial styloid process, initial encounter for closed fracture  SD APPLY FOREARM CAST    XR WRIST RIGHT (MIN 3 VIEWS)    SD CLOSED RX DIST RAD/ULNA FX      Orders Placed This Encounter   Procedures    XR WRIST RIGHT (MIN 3 VIEWS)     AP, lateral, oblique of right wrist in cast.     Standing Status:   Future     Standing Expiration Date:   4/12/2023     Order Specific Question:   Reason for exam:     Answer:   Right wrist fracture    SD APPLY FOREARM CAST     Short arm thumb spica cast, interphalangeal joint of right thumb free    SD CLOSED RX DIST RAD/ULNA FX     No orders of the defined types were placed in this encounter.      -Acute, nondisplaced fracture involving the right radial styloid: Findings were discussed with the patient. Have recommended cast immobilization to minimize potential for displacement, which could necessitate consideration of operative fixation or further manipulation. She is agreeable to that today and was placed in a short arm thumb spica cast extending to the interphalangeal joint. Would have her observe a 2 pound lifting restriction with no pushing or pulling. Will maintain elevation while seated or lying down to mitigate inflammation. May utilize Tylenol for discomfort. Would anticipate casting for a total of 4 weeks. Procedures Performed Today:     Placement of short arm thumb spica cast extending to the interphalangeal joint of the right thumb    Follow-up (with Radiographs) / Referral:     The patient will follow up in 2 weeks for clinical and radiographic assessment of right radial styloid fracture. We will update AP, lateral, oblique radiographs of the right wrist with cast in place.     Ivania Agrawal MD  Orthopaedic Surgery

## 2022-04-26 ENCOUNTER — OFFICE VISIT (OUTPATIENT)
Dept: ORTHOPEDIC SURGERY | Age: 72
End: 2022-04-26

## 2022-04-26 ENCOUNTER — HOSPITAL ENCOUNTER (OUTPATIENT)
Dept: ORTHOPEDIC SURGERY | Age: 72
Discharge: HOME | End: 2022-04-28
Payer: MEDICARE

## 2022-04-26 VITALS
HEART RATE: 62 BPM | HEIGHT: 61 IN | WEIGHT: 163 LBS | TEMPERATURE: 97.1 F | OXYGEN SATURATION: 99 % | BODY MASS INDEX: 30.78 KG/M2

## 2022-04-26 DIAGNOSIS — S52.514A NONDISPLACED FRACTURE OF RIGHT RADIAL STYLOID PROCESS, INITIAL ENCOUNTER FOR CLOSED FRACTURE: ICD-10-CM

## 2022-04-26 DIAGNOSIS — S52.514A NONDISPLACED FRACTURE OF RIGHT RADIAL STYLOID PROCESS, INITIAL ENCOUNTER FOR CLOSED FRACTURE: Primary | ICD-10-CM

## 2022-04-26 PROCEDURE — 99024 POSTOP FOLLOW-UP VISIT: CPT | Performed by: PHYSICIAN ASSISTANT

## 2022-04-26 PROCEDURE — 73110 X-RAY EXAM OF WRIST: CPT

## 2022-04-26 PROCEDURE — 73110 X-RAY EXAM OF WRIST: CPT | Performed by: ORTHOPAEDIC SURGERY

## 2022-04-26 NOTE — PROGRESS NOTES
[facility]      Subjective:      Chief Complaint   Patient presents with    Follow-up     Nondisplaced fracture of right radial styloid process, initial encounter for closed fracture. Pt states overall pain is doing better, She being able to move her finger around. HPI: Brennen Brooks is a 70 y.o. female who is here after radial styloid process fracture. This is occurred 2 weeks ago. She has been in a cast.  No pain, irritated with cast.    Past Medical History:   Diagnosis Date    Anxiety     CAD (coronary artery disease)     Diabetes mellitus ([address]     Dyslipidemia     FH: CAD (coronary artery disease)     History of TIA (transient ischemic attack)     HTN (hypertension)     EVITA (obstructive sleep apnea) 10/16/2020    Type [facility]     Unstable angina ([facility] 12/13/12      Past Surgical History:   Procedure Laterality Date    CORONARY ANGIOPLASTY  12/14/12    CORONARY ANGIOPLASTY WITH STENT PLACEMENT      DIAGNOSTIC CARDIAC CATH LAB PROCEDURE  12/14/12    HYSTERECTOMY  03/2018    TONSILLECTOMY       Social History     Socioeconomic History    Marital status:       Spouse name: Not on file    Number of children: Not on file    Years of education: Not on file    Highest education level: Not on file   Occupational History    Not on file   Tobacco Use    Smoking status: Never Smoker    Smokeless tobacco: Never Used   Vaping Use    Vaping Use: Never used   Substance and Sexual Activity    Alcohol use: No     Alcohol/week: 0.0 standard drinks    Drug use: No    Sexual activity: Not on file   Other Topics Concern    Not on file   Social History Narrative    Not on file     Social Determinants of Health     Financial Resource Strain:     Difficulty of Paying Living Expenses: Not on file   Food Insecurity:     Worried About Running Out of Food in the Last Year: Not on file    [address] [facility] [address] in the Last Year: Not on file   Transportation Needs:     Lack of Transportation (Medical): Not on file    Lack of Transportation (Non-Medical): Not on file   Physical Activity:     Days of Exercise per Week: Not on file    Minutes of Exercise per Session: Not on file   Stress:     Feeling of Stress : Not on file   Social Connections:     Frequency of Communication with Friends and Family: Not on file    Frequency of Social Gatherings with Friends and Family: Not on file    Attends Shinto Services: Not on file    Active Member of [address] [place] [address] [employer] or Organizations: Not on file    Attends Club or Organization Meetings: Not on file    Marital Status: Not on file   Intimate Partner Violence:     Fear of Current or Ex-Partner: Not on file    Emotionally Abused: Not on file    Physically Abused: Not on file    Sexually Abused: Not on file   Housing Stability:     Unable to Pay for Housing in the Last Year: Not on file    Number of [place] in the Last Year: Not on file    Unstable Housing in the Last Year: Not on file     Family History   Problem Relation Age of Onset    Heart Disease Mother     Heart Disease Father     Heart Disease Sister     Heart Disease Brother      Allergies   Allergen Reactions    Amoxicillin      Current Outpatient Medications on File Prior to Visit   Medication Sig Dispense Refill    benzonatate (TESSALON PERLES) 100 MG capsule Take 1 capsule by mouth 3 times daily as needed for Cough (Patient not taking: Reported on 4/12/2022) 20 capsule 0    cloNIDine (CATAPRES) 0.2 MG tablet Take 1 tablet by mouth 2 times daily Do not stop this medication suddenly.   Taper this medication off gradually if you want to discontinue 60 tablet 0    furosemide (LASIX) 20 MG tablet Take 1 tablet by mouth 2 times daily 4 tablet 0    metoprolol tartrate (LOPRESSOR) 25 MG tablet Take 1 tablet by mouth 2 times daily 60 tablet 3    omeprazole (PRILOSEC) 20 MG delayed release capsule Take 1 capsule by mouth noted. Radiocarpal joint and DRUJ intact. Impression: Stable, nondisplaced fracture of right radial styloid. Laboratory Studies:   Lab Results   Component Value Date    WBC 10.1 04/08/2022    HGB 13.0 04/08/2022    HCT 40.5 04/08/2022    MCV 87.7 04/08/2022     04/08/2022     No results found for: SEDRATE  No results found for: CRP    Assessment and Plan:      Diagnosis Orders   1. Nondisplaced fracture of right radial styloid process, initial encounter for closed fracture       2 weeks after radial styloid fracture at the right wrist.  Initially seen by Dr. Jessica Borrero. She had a short arm cast.  X-rays do not show any kind of displacement, no early healing. Therefore keep her in the cast for 2 weeks. We will see her back at that time for removal.  She instructed the importance of this cast to help prevent any further displacement as it may result in surgery. She understands this and promises to keep it on. The above plan was discussed in thorough detail with Dr. Whit Bronson and the patient. No orders of the defined types were placed in this encounter. No orders of the defined types were placed in this encounter. Return in about 16 days (around 5/12/2022).     Viridiana Jade PA-C  [place] [id number] [facility]  [phone]

## 2022-05-10 ENCOUNTER — HOSPITAL ENCOUNTER (OUTPATIENT)
Dept: ORTHOPEDIC SURGERY | Age: 72
Discharge: HOME | End: 2022-05-12
Payer: MEDICARE

## 2022-05-10 ENCOUNTER — OFFICE VISIT (OUTPATIENT)
Dept: ORTHOPEDIC SURGERY | Age: 72
End: 2022-05-10

## 2022-05-10 VITALS
WEIGHT: 163 LBS | TEMPERATURE: 98.2 F | HEIGHT: 61 IN | HEART RATE: 92 BPM | BODY MASS INDEX: 30.78 KG/M2 | OXYGEN SATURATION: 97 %

## 2022-05-10 DIAGNOSIS — S52.514A NONDISPLACED FRACTURE OF RIGHT RADIAL STYLOID PROCESS, INITIAL ENCOUNTER FOR CLOSED FRACTURE: ICD-10-CM

## 2022-05-10 DIAGNOSIS — S52.514D CLOSED NONDISPLACED FRACTURE OF STYLOID PROCESS OF RIGHT RADIUS WITH ROUTINE HEALING: Primary | ICD-10-CM

## 2022-05-10 PROCEDURE — 99024 POSTOP FOLLOW-UP VISIT: CPT | Performed by: ORTHOPAEDIC SURGERY

## 2022-05-10 PROCEDURE — 73110 X-RAY EXAM OF WRIST: CPT

## 2022-05-10 PROCEDURE — L3984 UPPER EXT FX ORTHOSIS WRIST: HCPCS | Performed by: ORTHOPAEDIC SURGERY

## 2022-05-10 PROCEDURE — 73110 X-RAY EXAM OF WRIST: CPT | Performed by: ORTHOPAEDIC SURGERY

## 2022-05-10 NOTE — PATIENT INSTRUCTIONS
Diagnosis:  -Healing fracture of right wrist (radial styloid)    Recommendations:  -You have been provided with a removable fracture brace for right wrist.  Recommend wearing this brace during the day but may remove for meals, for writing while seated, for 1 to 2 hours in the evening, as well as for sleep and hygiene purposes (for example, showering). -Limit lifting to 5 pounds or less with right hand.  -No pushing or pulling with right hand. -May apply ice packs to right wrist (with brace removed) for 15-20 minutes every 4 hours while awake, as needed to control inflammation/swelling.  -May take acetaminophen (Tylenol) 650 mg by mouth every 6 hours as needed to control pain.  -You may perform gentle range of motion exercises for your right wrist in the evening with the brace removed. If you are interested in pursuing physical therapy, please contact Dr. Kay Melvin office and we will provide you with a prescription for that purpose. [phone]. Procedures:  -Placement of short arm Exos thumb spica brace. Plan for follow-up:  -You will need to be seen by Conard Spatz PA-C (Dr. Kay Melvin office) on 6/14 for reassessment of right wrist fracture. We will update x-rays of the right wrist at that visit. [phone].

## 2022-05-10 NOTE — PROGRESS NOTES
Subjective:      Patient ID: Leeanne Jiang is a 70 y.o. female who presents today for:  Chief Complaint   Patient presents with    Follow-up     Nondisplaced fracture of right radial styloid process, initial encounter for closed fracture     HPI:    The patient returns for follow-up of nondisplaced fracture of the right radial styloid, now approximately 4.5 weeks out from the initial occurrence. Notes that the wrist is stiff now the cast has been removed. Has been compliant with activity restrictions. Currently rates pain 4/10, described as sharp, aggravated by wrist range of motion. Denies numbness or tingling. Past Medical History:   Diagnosis Date    Anxiety     CAD (coronary artery disease)     Diabetes mellitus ([address]     Dyslipidemia     FH: CAD (coronary artery disease)     History of TIA (transient ischemic attack)     HTN (hypertension)     EVITA (obstructive sleep apnea) 10/16/2020    Type 2 diabetes mellitus without complication [facility]     Unstable angina ([facility] 12/13/12     Past Surgical History:   Procedure Laterality Date    CORONARY ANGIOPLASTY  12/14/12    CORONARY ANGIOPLASTY WITH STENT PLACEMENT      DIAGNOSTIC CARDIAC CATH LAB PROCEDURE  12/14/12    HYSTERECTOMY  03/2018    TONSILLECTOMY       Social History     Socioeconomic History    Marital status:       Spouse name: Not on file    Number of children: Not on file    Years of education: Not on file    Highest education level: Not on file   Occupational History    Not on file   Tobacco Use    Smoking status: Never Smoker    Smokeless tobacco: Never Used   Vaping Use    Vaping Use: Never used   Substance and Sexual Activity    Alcohol use: No     Alcohol/week: 0.0 standard drinks    Drug use: No    Sexual activity: Not on file   Other Topics Concern    Not on file   Social History Narrative    Not on file     Social Determinants of Health     Financial Resource Strain:     Difficulty of Paying Living Expenses: Not on file   Food Insecurity:     Worried About Running Out of Food in the Last Year: Not on file    Shay of Food in the Last Year: Not on file   Transportation Needs:     Lack of Transportation (Medical): Not on file    Lack of Transportation (Non-Medical): Not on file   Physical Activity:     Days of Exercise per Week: Not on file    Minutes of Exercise per Session: Not on file   Stress:     Feeling of Stress : Not on file   Social Connections:     Frequency of Communication with Friends and Family: Not on file    Frequency of Social Gatherings with Friends and Family: Not on file    Attends Evangelical Services: Not on file    Active Member of [address] [place] [address] [employer] or Organizations: Not on file    Attends Club or Organization Meetings: Not on file    Marital Status: Not on file   Intimate Partner Violence:     Fear of Current or Ex-Partner: Not on file    Emotionally Abused: Not on file    Physically Abused: Not on file    Sexually Abused: Not on file   Housing Stability:     Unable to Pay for Housing in the Last Year: Not on file    Number of [place] in the Last Year: Not on file    Unstable Housing in the Last Year: Not on file     Family History   Problem Relation Age of Onset    Heart Disease Mother     Heart Disease Father     Heart Disease Sister     Heart Disease Brother      Allergies   Allergen Reactions    Amoxicillin      Current Outpatient Medications on File Prior to Visit   Medication Sig Dispense Refill    cloNIDine (CATAPRES) 0.2 MG tablet Take 1 tablet by mouth 2 times daily Do not stop this medication suddenly.   Taper this medication off gradually if you want to discontinue 60 tablet 0    furosemide (LASIX) 20 MG tablet Take 1 tablet by mouth 2 times daily 4 tablet 0    metoprolol tartrate (LOPRESSOR) 25 MG tablet Take 1 tablet by mouth 2 times daily 60 tablet 3    omeprazole (PRILOSEC) 20 MG delayed release capsule Take 1 capsule by mouth Daily 30 capsule 3    fluticasone (FLONASE) 50 MCG/ACT nasal spray 2 sprays by Nasal route every 12 hours for 15 days 1 Bottle 0    atorvastatin (LIPITOR) 20 MG tablet Take 20 mg by mouth daily      Cyanocobalamin (VITAMIN B-12) 2500 MCG SUBL Place 2,500 mcg under the tongue daily      ferrous sulfate (IRON 325) 325 (65 Fe) MG tablet Take 325 mg by mouth 2 times daily      ALPRAZolam (XANAX) 0.25 MG tablet Take 0.25 mg by mouth nightly as needed for Sleep.  clopidogrel (PLAVIX) 75 MG tablet Take 1 tablet by mouth daily 30 tablet 0    isosorbide mononitrate (IMDUR) 60 MG extended release tablet Take 1 tablet by mouth daily 30 tablet 6    nitroGLYCERIN (NITROSTAT) 0.4 MG SL tablet up to max of 3 total doses. If no relief after 1 dose, call 911. 25 tablet 3    meclizine (ANTIVERT) 25 MG tablet And every 6-8 hours as needed for dizziness 20 tablet 0    aspirin 81 MG chewable tablet Take 81 mg by mouth daily      HYDROcodone-acetaminophen (NORCO) 7.5-325 MG per tablet Take 1 tablet by mouth every 8 hours as needed for Pain . 12 tablet 0    amLODIPine (NORVASC) 10 MG tablet Take 5 mg by mouth daily       metformin (GLUCOPHAGE) 500 MG tablet Take 1,000 mg by mouth 2 times daily (with meals)        No current facility-administered medications on file prior to visit. Acute and Chronic Pain Monitoring:   RX Monitoring 12/13/2017   Attestation The Prescription Monitoring Report for this patient was reviewed today. Periodic Controlled Substance Monitoring No signs of potential drug abuse or diversion identified. Objective--Physical Examination:     Pulse 92   Temp 98.2 °F (36.8 °C) (Temporal)   Ht 5' 1\" (1.549 m)   Wt 163 lb (73.9 kg)   SpO2 97%   BMI 30.80 kg/m²     Physical Examination:  Pleasant 77-year-old female in mild distress, alert and cooperative. Examination of the right upper extremity reveals painless shoulder and elbow range of motion.   She is able to flex the wrist 20 degrees and extend 15 degrees. Mild discomfort with radial and ulnar deviation. No specific pain with palpation over the radial styloid. No discomfort over the DRUJ or anatomic snuffbox.  strength, finger abduction, EPL strength are 5/5. Wrist flexion and wrist extension are 4+/5. Sensory intact light touch in the radial, median, ulnar nerve distributions. Radial pulse 2+ with capillary refill less than 2 seconds. Radiographs and Laboratory Studies:     Diagnostic Imaging Studies:    XR WRIST RIGHT (MIN 3 VIEWS)    Result Date: 5/10/2022  AP, lateral, oblique radiographs of the right wrist were obtained to evaluate right radial styloid fracture. Demonstrate short oblique fracture of the radial styloid best visualized on the oblique view with early consolidation across the interstices. Anatomically aligned at the articular surface. DRUJ is intact. No scaphoid or ulnar styloid fracture. Impression: Healing, essentially nondisplaced fracture right radial styloid. Laboratory Studies:   Lab Results   Component Value Date    WBC 10.1 04/08/2022    HGB 13.0 04/08/2022    HCT 40.5 04/08/2022    MCV 87.7 04/08/2022     04/08/2022     No results found for: SEDRATE  No results found for: CRP    Assessment / Plan:      Diagnosis Orders   1. Closed nondisplaced fracture of styloid process of right radius with routine healing  XR WRIST RIGHT (MIN 3 VIEWS)    Whfo w/o joints pre cst    XR WRIST RIGHT (MIN 3 VIEWS)      Orders Placed This Encounter   Procedures    XR WRIST RIGHT (MIN 3 VIEWS)     Standing Status:   Future     Number of Occurrences:   1     Standing Expiration Date:   5/10/2023    XR WRIST RIGHT (MIN 3 VIEWS)     AP, lateral, oblique of right wrist out of fracture brace.      Standing Status:   Future     Standing Expiration Date:   5/10/2023     Order Specific Question:   Reason for exam:     Answer:   Right wrist fracture    Whfo w/o joints pre cst     Patient was prescribed an Exos Long Thumb Spica Fracture Brace. The right wrist will require stabilization / immobilization from this semi-rigid / rigid orthosis to improve their function. The orthosis will assist in protecting the affected area, provide functional support and facilitate healing. The orthosis used a heating element to mold and shape the brace to provide a customizable fit for the patient. The patient was educated and fit by a healthcare professional with expert knowledge and specialization in brace application while under the direct supervision of the treating physician. Verbal and written instructions for the use of and application of this item were provided. They were instructed to contact the office immediately should the brace result in increased pain, decreased sensation, increased swelling or worsening of the condition. No orders of the defined types were placed in this encounter.      -Healing, nondisplaced fracture of right radial styloid: Patient is doing well and demonstrates progressive healing radiographically. At this point we will transition her to a removable Exos brace with thumb spica extension. Would have her maintain this during the day, but may remove for meals, icing, writing, and for gentle range of motion as well as sleep and hygiene purposes. We discussed the option of occupational therapy to work on wrist range of motion and strengthening and she will contact her office should she wish to pursue that prior to her next visit. She may advance to 5 pound lifting restriction with no pushing or pulling. May ice to mitigate inflammation and continue with intermittent elevation. Utilize Tylenol to mitigate pain. Procedures Performed Today:     Application of Exos Short arm thumb spica brace. Follow-up (with Radiographs) / Referral:     The patient will follow up with Abelino Abarca PA-C in my office on 6/14.   We will update AP, lateral, oblique radiographs of the right wrist out of brace that the beginning of that visit.     Ivania Agrawal MD  Orthopaedic Surgery

## 2022-06-14 ENCOUNTER — HOSPITAL ENCOUNTER (OUTPATIENT)
Dept: ORTHOPEDIC SURGERY | Age: 72
Discharge: HOME | End: 2022-06-16
Payer: MEDICARE

## 2022-06-14 ENCOUNTER — OFFICE VISIT (OUTPATIENT)
Dept: ORTHOPEDIC SURGERY | Age: 72
End: 2022-06-14

## 2022-06-14 DIAGNOSIS — S52.514D CLOSED NONDISPLACED FRACTURE OF STYLOID PROCESS OF RIGHT RADIUS WITH ROUTINE HEALING: ICD-10-CM

## 2022-06-14 DIAGNOSIS — S52.514A NONDISPLACED FRACTURE OF RIGHT RADIAL STYLOID PROCESS, INITIAL ENCOUNTER FOR CLOSED FRACTURE: Primary | ICD-10-CM

## 2022-06-14 PROCEDURE — 73110 X-RAY EXAM OF WRIST: CPT

## 2022-06-14 PROCEDURE — 73110 X-RAY EXAM OF WRIST: CPT | Performed by: ORTHOPAEDIC SURGERY

## 2022-06-14 PROCEDURE — 99024 POSTOP FOLLOW-UP VISIT: CPT | Performed by: PHYSICIAN ASSISTANT

## 2022-06-14 NOTE — PROGRESS NOTES
[facility]      Subjective:      Chief Complaint   Patient presents with    1 Month Follow-Up     for Nondisplaced fracture of right radial styloid process       HPI: Harry Stafford is a 70 y.o. female who is about 9 weeks out since a right radial styloid fracture. Has some pain near the area and has significant stiffness    Past Medical History:   Diagnosis Date    Anxiety     CAD (coronary artery disease)     Diabetes mellitus [facility]     Dyslipidemia     FH: CAD (coronary artery disease)     History of TIA (transient ischemic attack)     HTN (hypertension)     EVITA (obstructive sleep apnea) 10/16/2020    Type 2 diabetes mellitus without complication ([facility]     Unstable angina ([facility] [facility] 12/13/12      Past Surgical History:   Procedure Laterality Date    CORONARY ANGIOPLASTY  12/14/12    CORONARY ANGIOPLASTY WITH STENT PLACEMENT      DIAGNOSTIC CARDIAC CATH LAB PROCEDURE  12/14/12    HYSTERECTOMY  03/2018    TONSILLECTOMY       Social History     Socioeconomic History    Marital status:      Spouse name: Not on file    Number of children: Not on file    Years of education: Not on file    Highest education level: Not on file   Occupational History    Not on file   Tobacco Use    Smoking status: Never Smoker    Smokeless tobacco: Never Used   Vaping Use    Vaping Use: Never used   Substance and Sexual Activity    Alcohol use: No     Alcohol/week: 0.0 standard drinks    Drug use: No    Sexual activity: Not on file   Other Topics Concern    Not on file   Social History Narrative    Not on file     Social Determinants of Health     Financial Resource Strain:     Difficulty of Paying Living Expenses: Not on file   Food Insecurity:     Worried About Running Out of Food in the Last Year: Not on file    Shay of Food in the Last Year: Not on file   Transportation Needs:     Lack of Transportation (Medical):  Not on file    Lack of Transportation  ferrous sulfate (IRON 325) 325 (65 Fe) MG tablet Take 325 mg by mouth 2 times daily      ALPRAZolam (XANAX) 0.25 MG tablet Take 0.25 mg by mouth nightly as needed for Sleep.  clopidogrel (PLAVIX) 75 MG tablet Take 1 tablet by mouth daily 30 tablet 0    isosorbide mononitrate (IMDUR) 60 MG extended release tablet Take 1 tablet by mouth daily 30 tablet 6    nitroGLYCERIN (NITROSTAT) 0.4 MG SL tablet up to max of 3 total doses. If no relief after 1 dose, call 911. 25 tablet 3    meclizine (ANTIVERT) 25 MG tablet And every 6-8 hours as needed for dizziness 20 tablet 0    aspirin 81 MG chewable tablet Take 81 mg by mouth daily      HYDROcodone-acetaminophen (NORCO) 7.5-325 MG per tablet Take 1 tablet by mouth every 8 hours as needed for Pain . 12 tablet 0    amLODIPine (NORVASC) 10 MG tablet Take 5 mg by mouth daily       metformin (GLUCOPHAGE) 500 MG tablet Take 1,000 mg by mouth 2 times daily (with meals)        No current facility-administered medications on file prior to visit. Objective: There were no vitals taken for this visit. Radiographs and Laboratory Studies:   Previous diagnostic imaging studies were reviewed. XR WRIST RIGHT (MIN 3 VIEWS)    Result Date: 6/14/2022  AP, lateral, oblique of the right wrist were obtained to evaluate right radial styloid fracture. Demonstrate progressive consolidation across radial styloid fragment with good alignment of the articular surface. No evidence of ulnar styloid pathology. DRUJ is intact. Scapholunate interval within normal limits. Impression: Healing fracture of right radial styloid. Laboratory Studies:   Lab Results   Component Value Date    WBC 10.1 04/08/2022    HGB 13.0 04/08/2022    HCT 40.5 04/08/2022    MCV 87.7 04/08/2022     04/08/2022     No results found for: SEDRATE  No results found for: CRP    Assessment and Plan:      Diagnosis Orders   1.  Nondisplaced fracture of right radial styloid process, initial encounter for closed fracture  Ambulatory referral to Occupational Therapy       2 months after radial styloid fracture of her dominant right hand. She has some residual stiffness for since pinning the brace. This is especially with extension. She will need to go to therapy for this. She no longer needs to wear her brace unless she is doing heavy activities outside. She may be leaving to [place] in the near future so she will call and make an appointment but I would like to see her back at the end of July to make sure that she has restored her motion and hence the importance of therapy. The above plan was discussed in thorough detail with Dr. Ciera Paredes and the patient. No orders of the defined types were placed in this encounter. No orders of the defined types were placed in this encounter. No follow-ups on file.     Ame Wang PA-C  [facility]  [phone]

## 2024-02-07 ENCOUNTER — APPOINTMENT (OUTPATIENT)
Dept: GENERAL RADIOLOGY | Age: 74
End: 2024-02-07
Payer: MEDICARE

## 2024-02-07 ENCOUNTER — HOSPITAL ENCOUNTER (EMERGENCY)
Age: 74
Discharge: HOME | End: 2024-02-07
Payer: MEDICARE

## 2024-02-07 ENCOUNTER — APPOINTMENT (OUTPATIENT)
Dept: CT IMAGING | Age: 74
End: 2024-02-07
Payer: MEDICARE

## 2024-02-07 VITALS
HEART RATE: 80 BPM | DIASTOLIC BLOOD PRESSURE: 72 MMHG | RESPIRATION RATE: 19 BRPM | HEIGHT: 63 IN | BODY MASS INDEX: 28.35 KG/M2 | SYSTOLIC BLOOD PRESSURE: 159 MMHG | OXYGEN SATURATION: 99 % | TEMPERATURE: 97.8 F | WEIGHT: 160 LBS

## 2024-02-07 DIAGNOSIS — S09.90XA CLOSED HEAD INJURY, INITIAL ENCOUNTER: Primary | ICD-10-CM

## 2024-02-07 DIAGNOSIS — S62.609A CLOSED FRACTURE OF PHALANX OF DIGIT OF HAND, INITIAL ENCOUNTER: ICD-10-CM

## 2024-02-07 DIAGNOSIS — S63.259A DISLOCATION OF FINGER, INITIAL ENCOUNTER: ICD-10-CM

## 2024-02-07 LAB
ALBUMIN SERPL-MCNC: 4.5 G/DL (ref 3.5–4.6)
ALP SERPL-CCNC: 57 U/L (ref 40–130)
ALT SERPL-CCNC: 16 U/L (ref 0–33)
ANION GAP SERPL CALCULATED.3IONS-SCNC: 15 MEQ/L (ref 9–15)
APTT PPP: 26 SEC (ref 24.4–36.8)
AST SERPL-CCNC: 18 U/L (ref 0–35)
BASOPHILS # BLD: 0 K/UL (ref 0–0.2)
BASOPHILS NFR BLD: 0.6 %
BILIRUB SERPL-MCNC: 0.6 MG/DL (ref 0.2–0.7)
BUN SERPL-MCNC: 20 MG/DL (ref 8–23)
CALCIUM SERPL-MCNC: 9.7 MG/DL (ref 8.5–9.9)
CHLORIDE SERPL-SCNC: 99 MEQ/L (ref 95–107)
CO2 SERPL-SCNC: 25 MEQ/L (ref 20–31)
CREAT SERPL-MCNC: 0.46 MG/DL (ref 0.5–0.9)
EOSINOPHIL # BLD: 0.2 K/UL (ref 0–0.7)
EOSINOPHIL NFR BLD: 2.3 %
ERYTHROCYTE [DISTWIDTH] IN BLOOD BY AUTOMATED COUNT: 15.4 % (ref 11.5–14.5)
ETHANOL PERCENT: NORMAL G/DL
ETHANOLAMINE SERPL-MCNC: <10 MG/DL (ref 0–0.08)
GLOBULIN SER CALC-MCNC: 3.5 G/DL (ref 2.3–3.5)
GLUCOSE SERPL-MCNC: 160 MG/DL (ref 70–99)
HCT VFR BLD AUTO: 40.6 % (ref 37–47)
HGB BLD-MCNC: 12.4 G/DL (ref 12–16)
INR PPP: 0.9
LYMPHOCYTES # BLD: 2.4 K/UL (ref 1–4.8)
LYMPHOCYTES NFR BLD: 36.9 %
MCH RBC QN AUTO: 27.3 PG (ref 27–31.3)
MCHC RBC AUTO-ENTMCNC: 30.5 % (ref 33–37)
MCV RBC AUTO: 89.2 FL (ref 79.4–94.8)
MONOCYTES # BLD: 0.5 K/UL (ref 0.2–0.8)
MONOCYTES NFR BLD: 7.3 %
NEUTROPHILS # BLD: 3.5 K/UL (ref 1.4–6.5)
NEUTS SEG NFR BLD: 52.6 %
PLATELET # BLD AUTO: 215 K/UL (ref 130–400)
POTASSIUM SERPL-SCNC: 4 MEQ/L (ref 3.4–4.9)
PROT SERPL-MCNC: 8 G/DL (ref 6.3–8)
PROTHROMBIN TIME: 12.9 SEC (ref 12.3–14.9)
RBC # BLD AUTO: 4.55 M/UL (ref 4.2–5.4)
SODIUM SERPL-SCNC: 139 MEQ/L (ref 135–144)
WBC # BLD AUTO: 6.6 K/UL (ref 4.8–10.8)

## 2024-02-07 PROCEDURE — 99285 EMERGENCY DEPT VISIT HI MDM: CPT

## 2024-02-07 PROCEDURE — 96375 TX/PRO/DX INJ NEW DRUG ADDON: CPT

## 2024-02-07 PROCEDURE — 72125 CT NECK SPINE W/O DYE: CPT

## 2024-02-07 PROCEDURE — 73130 X-RAY EXAM OF HAND: CPT

## 2024-02-07 PROCEDURE — 73590 X-RAY EXAM OF LOWER LEG: CPT

## 2024-02-07 PROCEDURE — 85610 PROTHROMBIN TIME: CPT

## 2024-02-07 PROCEDURE — 70486 CT MAXILLOFACIAL W/O DYE: CPT

## 2024-02-07 PROCEDURE — 85025 COMPLETE CBC W/AUTO DIFF WBC: CPT

## 2024-02-07 PROCEDURE — 70450 CT HEAD/BRAIN W/O DYE: CPT

## 2024-02-07 PROCEDURE — 73110 X-RAY EXAM OF WRIST: CPT

## 2024-02-07 PROCEDURE — 36415 COLL VENOUS BLD VENIPUNCTURE: CPT

## 2024-02-07 PROCEDURE — 6360000002 HC RX W HCPCS: Performed by: PHYSICIAN ASSISTANT

## 2024-02-07 PROCEDURE — 85730 THROMBOPLASTIN TIME PARTIAL: CPT

## 2024-02-07 PROCEDURE — 96376 TX/PRO/DX INJ SAME DRUG ADON: CPT

## 2024-02-07 PROCEDURE — 80053 COMPREHEN METABOLIC PANEL: CPT

## 2024-02-07 PROCEDURE — 96374 THER/PROPH/DIAG INJ IV PUSH: CPT

## 2024-02-07 PROCEDURE — 73503 X-RAY EXAM HIP UNI 4/> VIEWS: CPT

## 2024-02-07 PROCEDURE — 73630 X-RAY EXAM OF FOOT: CPT

## 2024-02-07 PROCEDURE — 82077 ASSAY SPEC XCP UR&BREATH IA: CPT

## 2024-02-07 RX ORDER — FENTANYL CITRATE 0.05 MG/ML
50 INJECTION, SOLUTION INTRAMUSCULAR; INTRAVENOUS ONCE
Status: COMPLETED | OUTPATIENT
Start: 2024-02-07 | End: 2024-02-07

## 2024-02-07 RX ORDER — ONDANSETRON 2 MG/ML
4 INJECTION INTRAMUSCULAR; INTRAVENOUS ONCE
Status: COMPLETED | OUTPATIENT
Start: 2024-02-07 | End: 2024-02-07

## 2024-02-07 RX ORDER — OXYCODONE HYDROCHLORIDE AND ACETAMINOPHEN 5; 325 MG/1; MG/1
1 TABLET ORAL EVERY 6 HOURS PRN
Qty: 12 TABLET | Refills: 0 | Status: SHIPPED | OUTPATIENT
Start: 2024-02-07 | End: 2024-02-10

## 2024-02-07 RX ADMIN — ONDANSETRON 4 MG: 2 INJECTION INTRAMUSCULAR; INTRAVENOUS at 15:08

## 2024-02-07 RX ADMIN — FENTANYL CITRATE 50 MCG: 0.05 INJECTION, SOLUTION INTRAMUSCULAR; INTRAVENOUS at 16:47

## 2024-02-07 RX ADMIN — FENTANYL CITRATE 50 MCG: 0.05 INJECTION, SOLUTION INTRAMUSCULAR; INTRAVENOUS at 15:07

## 2024-02-07 ASSESSMENT — ENCOUNTER SYMPTOMS
ABDOMINAL DISTENTION: 0
EYE DISCHARGE: 0
RHINORRHEA: 0
COLOR CHANGE: 0
SHORTNESS OF BREATH: 0
SORE THROAT: 0
CONSTIPATION: 0
ABDOMINAL PAIN: 0

## 2024-02-07 ASSESSMENT — PAIN - FUNCTIONAL ASSESSMENT
PAIN_FUNCTIONAL_ASSESSMENT: 0-10
PAIN_FUNCTIONAL_ASSESSMENT: 0-10

## 2024-02-07 ASSESSMENT — PAIN SCALES - GENERAL
PAINLEVEL_OUTOF10: 9
PAINLEVEL_OUTOF10: 10
PAINLEVEL_OUTOF10: 7

## 2024-02-07 ASSESSMENT — PAIN DESCRIPTION - ORIENTATION: ORIENTATION: LEFT

## 2024-02-07 ASSESSMENT — PAIN DESCRIPTION - DESCRIPTORS: DESCRIPTORS: DISCOMFORT

## 2024-02-07 ASSESSMENT — PAIN DESCRIPTION - LOCATION: LOCATION: HAND

## 2024-02-07 NOTE — ED PROVIDER NOTES
diaphoretic.   HENT:      Head: Normocephalic. Abrasion present.        Right Ear: Tympanic membrane normal.      Left Ear: Tympanic membrane normal.      Nose: Nose normal. No congestion.      Mouth/Throat:      Mouth: Mucous membranes are moist.      Pharynx: Oropharynx is clear. No oropharyngeal exudate or posterior oropharyngeal erythema.   Eyes:      Extraocular Movements: Extraocular movements intact.      Conjunctiva/sclera: Conjunctivae normal.      Pupils: Pupils are equal, round, and reactive to light.      Comments: Pupils are equal round and reactive to light at approximately 3 mm, no nystagmus.   Neck:      Vascular: No JVD.      Trachea: No tracheal deviation.      Comments: Neck is supple, there is no midline tenderness, no step-offs, no crepitus, no deformity, full range of motion with no pain  Cardiovascular:      Rate and Rhythm: Normal rate.      Pulses: Normal pulses.      Heart sounds: Normal heart sounds. No murmur heard.     No friction rub. No gallop.   Pulmonary:      Effort: Pulmonary effort is normal. No tachypnea, accessory muscle usage, respiratory distress or retractions.      Breath sounds: No stridor. No wheezing, rhonchi or rales.      Comments: Lung sounds are clear in all fields, there is no wheezes rales or rhonchi, no accessory muscle use, no retractions, no crepitus, no paradoxical movement or flail segments.  Chest:      Chest wall: No tenderness.   Abdominal:      General: Abdomen is flat. Bowel sounds are normal. There is no distension or abdominal bruit.      Palpations: There is no shifting dullness, fluid wave, hepatomegaly, splenomegaly, mass or pulsatile mass.      Tenderness: There is no abdominal tenderness. There is no right CVA tenderness, left CVA tenderness, guarding or rebound. Negative signs include Bingham's sign, Rovsing's sign and McBurney's sign.      Comments: Abdomen soft nondistended nontender no guarding mass rebound, no CVA tenderness.   Musculoskeletal:

## 2024-02-08 ENCOUNTER — OFFICE VISIT (OUTPATIENT)
Dept: ORTHOPEDIC SURGERY | Facility: CLINIC | Age: 74
End: 2024-02-08
Payer: COMMERCIAL

## 2024-02-08 ENCOUNTER — ANESTHESIA EVENT (OUTPATIENT)
Dept: OPERATING ROOM | Facility: HOSPITAL | Age: 74
End: 2024-02-08
Payer: COMMERCIAL

## 2024-02-08 DIAGNOSIS — S62.613A DISPLACED FRACTURE OF PROXIMAL PHALANX OF LEFT MIDDLE FINGER, INITIAL ENCOUNTER FOR CLOSED FRACTURE: ICD-10-CM

## 2024-02-08 DIAGNOSIS — G89.18 POST-OP PAIN: ICD-10-CM

## 2024-02-08 DIAGNOSIS — S63.269A: ICD-10-CM

## 2024-02-08 DIAGNOSIS — S62.617A DISPLACED FRACTURE OF PROXIMAL PHALANX OF LEFT LITTLE FINGER, INITIAL ENCOUNTER FOR CLOSED FRACTURE: Primary | ICD-10-CM

## 2024-02-08 DIAGNOSIS — M79.642 PAIN OF LEFT HAND: ICD-10-CM

## 2024-02-08 PROBLEM — S63.065A: Status: ACTIVE | Noted: 2024-02-08

## 2024-02-08 PROBLEM — G45.9 TIA (TRANSIENT ISCHEMIC ATTACK): Status: ACTIVE | Noted: 2024-02-08

## 2024-02-08 PROBLEM — E11.9 DIABETES MELLITUS, TYPE 2 (MULTI): Status: ACTIVE | Noted: 2024-02-08

## 2024-02-08 PROBLEM — I10 HTN (HYPERTENSION): Status: ACTIVE | Noted: 2024-02-08

## 2024-02-08 PROBLEM — E78.5 HYPERLIPIDEMIA: Status: ACTIVE | Noted: 2024-02-08

## 2024-02-08 PROBLEM — Z01.818 PRE-OP EXAM: Status: ACTIVE | Noted: 2024-02-08

## 2024-02-08 PROBLEM — I25.10 CAD (CORONARY ARTERY DISEASE): Status: ACTIVE | Noted: 2024-02-08

## 2024-02-08 PROCEDURE — 99214 OFFICE O/P EST MOD 30 MIN: CPT | Performed by: ORTHOPAEDIC SURGERY

## 2024-02-08 PROCEDURE — 99204 OFFICE O/P NEW MOD 45 MIN: CPT | Performed by: ORTHOPAEDIC SURGERY

## 2024-02-08 RX ORDER — FERROUS SULFATE 325(65) MG
325 TABLET ORAL
COMMUNITY

## 2024-02-08 RX ORDER — GUAIFENESIN 600 MG/1
1200 TABLET, EXTENDED RELEASE ORAL 2 TIMES DAILY
COMMUNITY

## 2024-02-08 RX ORDER — BISMUTH SUBSALICYLATE 262 MG
1 TABLET,CHEWABLE ORAL DAILY
COMMUNITY

## 2024-02-08 RX ORDER — CLOPIDOGREL BISULFATE 75 MG/1
75 TABLET ORAL DAILY
COMMUNITY

## 2024-02-08 RX ORDER — OMEGA-3-ACID ETHYL ESTERS 1 G/1
1 CAPSULE, LIQUID FILLED ORAL DAILY
COMMUNITY

## 2024-02-08 RX ORDER — ASPIRIN 81 MG/1
81 TABLET ORAL DAILY
COMMUNITY

## 2024-02-08 RX ORDER — CEFAZOLIN SODIUM 2 G/100ML
2 INJECTION, SOLUTION INTRAVENOUS ONCE
Status: CANCELLED | OUTPATIENT
Start: 2024-02-08 | End: 2024-02-08

## 2024-02-08 RX ORDER — ISOSORBIDE MONONITRATE 120 MG/1
120 TABLET, EXTENDED RELEASE ORAL DAILY
COMMUNITY

## 2024-02-08 RX ORDER — CHOLECALCIFEROL (VITAMIN D3) 50 MCG
50 TABLET ORAL DAILY
COMMUNITY

## 2024-02-08 RX ORDER — AMLODIPINE BESYLATE 10 MG/1
TABLET ORAL DAILY
COMMUNITY

## 2024-02-08 RX ORDER — SODIUM CHLORIDE, SODIUM LACTATE, POTASSIUM CHLORIDE, CALCIUM CHLORIDE 600; 310; 30; 20 MG/100ML; MG/100ML; MG/100ML; MG/100ML
100 INJECTION, SOLUTION INTRAVENOUS CONTINUOUS
Status: CANCELLED | OUTPATIENT
Start: 2024-02-08

## 2024-02-08 RX ORDER — METOPROLOL TARTRATE 50 MG/1
1 TABLET ORAL 2 TIMES DAILY
COMMUNITY

## 2024-02-08 RX ORDER — OXYCODONE AND ACETAMINOPHEN 5; 325 MG/1; MG/1
1 TABLET ORAL EVERY 8 HOURS PRN
Qty: 20 TABLET | Refills: 0 | Status: SHIPPED | OUTPATIENT
Start: 2024-02-08 | End: 2024-02-15

## 2024-02-08 RX ORDER — ESCITALOPRAM OXALATE 10 MG/1
10 TABLET ORAL DAILY
COMMUNITY

## 2024-02-08 RX ORDER — ALBUTEROL SULFATE 90 UG/1
1 AEROSOL, METERED RESPIRATORY (INHALATION) EVERY 6 HOURS PRN
COMMUNITY
Start: 2024-02-08

## 2024-02-08 RX ORDER — ATORVASTATIN CALCIUM 40 MG/1
40 TABLET, FILM COATED ORAL DAILY
COMMUNITY

## 2024-02-08 RX ORDER — NITROGLYCERIN 0.4 MG/1
0.4 TABLET SUBLINGUAL EVERY 5 MIN PRN
COMMUNITY

## 2024-02-08 RX ORDER — MECLIZINE HYDROCHLORIDE 25 MG/1
1 TABLET ORAL AS NEEDED
COMMUNITY
Start: 2019-04-20

## 2024-02-08 RX ORDER — METFORMIN HYDROCHLORIDE 1000 MG/1
1000 TABLET ORAL
COMMUNITY

## 2024-02-08 RX ORDER — VALSARTAN 80 MG/1
80 TABLET ORAL DAILY
COMMUNITY

## 2024-02-08 SDOH — HEALTH STABILITY: MENTAL HEALTH: CURRENT SMOKER: 0

## 2024-02-08 NOTE — PREPROCEDURE INSTRUCTIONS
Reviewed pre-op instructions with patient/daughter, Tara,  including NPO after midnight, must have , hospital and check in location, and day of surgery routine. Patient aware to hold aspirin and plavix as directed and aware to take Lopressor the morning of the procedure with a sip of water.

## 2024-02-08 NOTE — PROGRESS NOTES
History present illness: Patient presents status post mechanical fall injuring the left hand.  Patient is right-hand dominant.  History of diabetes hypertension hypothyroidism hyperlipidemia and coronary artery disease with 3 cardiac stents.  She takes Plavix.  Right-hand-dominant.  She describes gross deformity to long finger ring finger and small finger with the original mechanism.  She shows me a picture showing no evidence for open wound but gross deformity to the digits mentioned above.  She presents today for evaluation and treatment.      Past medical history: The patient's past medical history, family history, social history, and review of systems were documented on the patient medical intake.  The updated data was reviewed in the electronic medical record.  History is negative except otherwise stated in history of present illness.        Physical examination:  General: Alert and oriented to person, place, and time.  No acute distress and breathing comfortably: Pleasant and cooperative with examination.  HEENT: Head is normocephalic and atraumatic.  Neck: Supple, no visible swelling.  Cardiovascular: No palpable tachycardia  Lungs: No audible wheezing or labored breathing  Abdomen: Nondistended.  Extremities: Evaluation of the left upper extremity finds the patient had palpable radial artery at the wrist with brisk capillary refill to all digits.  Patient has intact sensation to axillary radial median and ulnar nerves.  There are no open wounds.  There are no signs of infection.  There is no evidence of lymphedema or lymphatic streaking.  The patient has supple compartments to left arm forearm and hand.  Tenderness to MCPs and P1 base to long ring and small fingers as palpable about volar splint.  Digits well-perfused.      Radiology: X-rays were reviewed from outlying facility show displaced P1 base fractures to long and small fingers.  Injury films show dislocation of fourth MCP which seems to be  adequately reduced on postreduction images      Assessment: Closed left small finger and left long finger P1 base fracture.  Closed dislocation left fourth MCP.      Plan: Treatment options were discussed.  We talked about operative and nonoperative strategies.  We talked about intraoperative techniques and postoperative protocols.  Patient elects proceed forth with closed reduction percutaneous pinning versus open reduction with internal fixation to the P1 base fractures to long and small finger.  We will stressed the fourth MCP dislocation.  If it is stable we will continue on with closed management.  If it is grossly unstable we would likely crosspin.  Plan for surgery tomorrow likely under general anesthesia.        Procedure:

## 2024-02-08 NOTE — ANESTHESIA PREPROCEDURE EVALUATION
"Smita Tee is a 73 y.o. female here for:    OPEN TREATMENT OF LEFT LONG FINGER AND LEFT SMALL FINGER PROXIMAL PHALANX FRACTURE WITH POSSIBLE OPERATIVE STABILIZATION OF LEFT FOURTH METACARPOPHALANGEAL DISLOCATION, K-WIRES  With Ant Cruz DO  Pre-Op Diagnosis Codes:     * Broken finger [S62.613A]     * Broken finger [S62.617A]     * Dislocation of wrist [S63.065A]     * Encounter for pre-operative examination [Z01.818]    Relevant Problems   Cardiovascular   (+) CAD (coronary artery disease) (status post remote PCI of the LAD and circumflex)   (+) HTN (hypertension)   (+) Hyperlipidemia      Endocrine   (+) Diabetes mellitus, type 2 (CMS/HCC)      Neuro/Psych   (+) TIA (transient ischemic attack)      Musculoskeletal   (+) Dislocation of metacarpal (bone), proximal end of left hand, initial encounter   (+) Displaced fracture of proximal phalanx of left little finger, initial encounter for closed fracture   (+) Displaced fracture of proximal phalanx of left middle finger, initial encounter for closed fracture       No results found for: \"HGB\", \"HCT\", \"WBC\", \"PLT\", \"GLU\", \"NA\", \"K\", \"CL\", \"CREATININE\", \"BUN\"    EKG 2022:  Normal sinus rhythm   Normal ECG   When compared with ECG of 20-NOV-2021 21:51,   No significant change was found     Social History     Tobacco Use   Smoking Status Never   Smokeless Tobacco Never       Allergies   Allergen Reactions    Amoxicillin Rash       Current Outpatient Medications   Medication Instructions    albuterol 90 mcg/actuation inhaler 1 puff, Every 6 hours PRN    amLODIPine (Norvasc) 10 mg tablet oral, Daily    aspirin 81 mg, oral, Daily    atorvastatin (LIPITOR) 40 mg, oral, Daily    cholecalciferol (VITAMIN D3) 50 mcg, oral, Daily    clopidogrel (PLAVIX) 75 mg, oral, Daily    cyanocobalamin, vitamin B-12, (VITAMIN B-12 ORAL) oral, Daily    escitalopram (LEXAPRO) 10 mg, oral, Daily    ferrous sulfate (325 mg ferrous sulfate) 325 mg, oral    guaiFENesin (MUCINEX) 1,200 " mg, oral, 2 times daily, Do not crush, chew, or split.    isosorbide mononitrate ER (IMDUR) 120 mg, oral, Daily, Do not crush or chew.    meclizine (Antivert) 25 mg tablet 1 tablet, oral, As needed    metFORMIN (GLUCOPHAGE) 1,000 mg, oral, 2 times daily with meals    metoprolol tartrate (Lopressor) 50 mg tablet 1 tablet, oral, 2 times daily    multivitamin tablet 1 tablet, oral, Daily    nitroglycerin (NITROSTAT) 0.4 mg, sublingual, Every 5 min PRN    omega-3 acid ethyl esters (Lovaza) 1 gram capsule 1 capsule, oral, Daily    oxyCODONE-acetaminophen (Percocet) 5-325 mg tablet 1 tablet, oral, Every 8 hours PRN    valsartan (DIOVAN) 80 mg, oral, Daily       Past Surgical History:   Procedure Laterality Date    CARDIAC CATHETERIZATION      CATARACT EXTRACTION      CHOLECYSTECTOMY      CORONARY STENT PLACEMENT      CPAP      EYE SURGERY      HIP ARTHROPLASTY Bilateral     HYSTERECTOMY         No family history on file.    NPO Details:  No data recorded    Physical Exam    Airway  Mallampati: III  TM distance: >3 FB     Cardiovascular    Dental - normal exam     Pulmonary    Abdominal            Anesthesia Plan    History of general anesthesia?: yes  History of complications of general anesthesia?: no    ASA 3     general     The patient is not a current smoker.    intravenous induction   Postoperative administration of opioids is intended.  Trial extubation is planned.  Anesthetic plan and risks discussed with patient.    Plan discussed with CRNA.

## 2024-02-09 ENCOUNTER — HOSPITAL ENCOUNTER (OUTPATIENT)
Facility: HOSPITAL | Age: 74
Setting detail: OUTPATIENT SURGERY
Discharge: HOME | End: 2024-02-09
Attending: ORTHOPAEDIC SURGERY | Admitting: ORTHOPAEDIC SURGERY
Payer: COMMERCIAL

## 2024-02-09 ENCOUNTER — ANESTHESIA (OUTPATIENT)
Dept: OPERATING ROOM | Facility: HOSPITAL | Age: 74
End: 2024-02-09
Payer: COMMERCIAL

## 2024-02-09 ENCOUNTER — HOSPITAL ENCOUNTER (OUTPATIENT)
Dept: CARDIOLOGY | Facility: HOSPITAL | Age: 74
Setting detail: OUTPATIENT SURGERY
Discharge: HOME | End: 2024-02-09
Payer: COMMERCIAL

## 2024-02-09 ENCOUNTER — APPOINTMENT (OUTPATIENT)
Dept: RADIOLOGY | Facility: HOSPITAL | Age: 74
End: 2024-02-09
Payer: COMMERCIAL

## 2024-02-09 VITALS
HEIGHT: 61 IN | RESPIRATION RATE: 16 BRPM | TEMPERATURE: 98.4 F | BODY MASS INDEX: 30.47 KG/M2 | DIASTOLIC BLOOD PRESSURE: 55 MMHG | SYSTOLIC BLOOD PRESSURE: 111 MMHG | OXYGEN SATURATION: 95 % | WEIGHT: 161.38 LBS | HEART RATE: 68 BPM

## 2024-02-09 DIAGNOSIS — S62.613A DISPLACED FRACTURE OF PROXIMAL PHALANX OF LEFT MIDDLE FINGER, INITIAL ENCOUNTER FOR CLOSED FRACTURE: Primary | ICD-10-CM

## 2024-02-09 DIAGNOSIS — S63.065A: ICD-10-CM

## 2024-02-09 DIAGNOSIS — Z01.818 PRE-OP EXAM: ICD-10-CM

## 2024-02-09 DIAGNOSIS — S62.617A DISPLACED FRACTURE OF PROXIMAL PHALANX OF LEFT LITTLE FINGER, INITIAL ENCOUNTER FOR CLOSED FRACTURE: ICD-10-CM

## 2024-02-09 LAB
GLUCOSE BLD MANUAL STRIP-MCNC: 162 MG/DL (ref 74–99)
GLUCOSE BLD MANUAL STRIP-MCNC: 176 MG/DL (ref 74–99)

## 2024-02-09 PROCEDURE — 82947 ASSAY GLUCOSE BLOOD QUANT: CPT

## 2024-02-09 PROCEDURE — 3600000008 HC OR TIME - EACH INCREMENTAL 1 MINUTE - PROCEDURE LEVEL THREE: Performed by: ORTHOPAEDIC SURGERY

## 2024-02-09 PROCEDURE — 26727 TREAT FINGER FRACTURE EACH: CPT | Performed by: ORTHOPAEDIC SURGERY

## 2024-02-09 PROCEDURE — 2500000004 HC RX 250 GENERAL PHARMACY W/ HCPCS (ALT 636 FOR OP/ED): Performed by: STUDENT IN AN ORGANIZED HEALTH CARE EDUCATION/TRAINING PROGRAM

## 2024-02-09 PROCEDURE — 26735 TREAT FINGER FRACTURE EACH: CPT | Performed by: ORTHOPAEDIC SURGERY

## 2024-02-09 PROCEDURE — 2500000004 HC RX 250 GENERAL PHARMACY W/ HCPCS (ALT 636 FOR OP/ED): Performed by: PHYSICIAN ASSISTANT

## 2024-02-09 PROCEDURE — 2500000005 HC RX 250 GENERAL PHARMACY W/O HCPCS: Performed by: REGISTERED NURSE

## 2024-02-09 PROCEDURE — 2500000004 HC RX 250 GENERAL PHARMACY W/ HCPCS (ALT 636 FOR OP/ED): Performed by: REGISTERED NURSE

## 2024-02-09 PROCEDURE — 7100000001 HC RECOVERY ROOM TIME - INITIAL BASE CHARGE: Performed by: ORTHOPAEDIC SURGERY

## 2024-02-09 PROCEDURE — 2500000005 HC RX 250 GENERAL PHARMACY W/O HCPCS: Performed by: ORTHOPAEDIC SURGERY

## 2024-02-09 PROCEDURE — 3600000003 HC OR TIME - INITIAL BASE CHARGE - PROCEDURE LEVEL THREE: Performed by: ORTHOPAEDIC SURGERY

## 2024-02-09 PROCEDURE — A4217 STERILE WATER/SALINE, 500 ML: HCPCS | Performed by: ORTHOPAEDIC SURGERY

## 2024-02-09 PROCEDURE — 7100000010 HC PHASE TWO TIME - EACH INCREMENTAL 1 MINUTE: Performed by: ORTHOPAEDIC SURGERY

## 2024-02-09 PROCEDURE — 7100000002 HC RECOVERY ROOM TIME - EACH INCREMENTAL 1 MINUTE: Performed by: ORTHOPAEDIC SURGERY

## 2024-02-09 PROCEDURE — 26705 TREAT KNUCKLE DISLOCATION: CPT | Performed by: ORTHOPAEDIC SURGERY

## 2024-02-09 PROCEDURE — 7100000009 HC PHASE TWO TIME - INITIAL BASE CHARGE: Performed by: ORTHOPAEDIC SURGERY

## 2024-02-09 PROCEDURE — 3700000001 HC GENERAL ANESTHESIA TIME - INITIAL BASE CHARGE: Performed by: ORTHOPAEDIC SURGERY

## 2024-02-09 PROCEDURE — 2500000001 HC RX 250 WO HCPCS SELF ADMINISTERED DRUGS (ALT 637 FOR MEDICARE OP): Performed by: STUDENT IN AN ORGANIZED HEALTH CARE EDUCATION/TRAINING PROGRAM

## 2024-02-09 PROCEDURE — 93010 ELECTROCARDIOGRAM REPORT: CPT | Performed by: INTERNAL MEDICINE

## 2024-02-09 PROCEDURE — 3700000002 HC GENERAL ANESTHESIA TIME - EACH INCREMENTAL 1 MINUTE: Performed by: ORTHOPAEDIC SURGERY

## 2024-02-09 PROCEDURE — 2500000004 HC RX 250 GENERAL PHARMACY W/ HCPCS (ALT 636 FOR OP/ED): Performed by: ORTHOPAEDIC SURGERY

## 2024-02-09 PROCEDURE — 93005 ELECTROCARDIOGRAM TRACING: CPT | Mod: 59

## 2024-02-09 DEVICE — IMPLANTABLE DEVICE: Type: IMPLANTABLE DEVICE | Site: FINGERS | Status: FUNCTIONAL

## 2024-02-09 RX ORDER — LABETALOL HYDROCHLORIDE 5 MG/ML
5 INJECTION, SOLUTION INTRAVENOUS ONCE AS NEEDED
Status: DISCONTINUED | OUTPATIENT
Start: 2024-02-09 | End: 2024-02-09 | Stop reason: HOSPADM

## 2024-02-09 RX ORDER — DEXAMETHASONE SODIUM PHOSPHATE 4 MG/ML
INJECTION, SOLUTION INTRA-ARTICULAR; INTRALESIONAL; INTRAMUSCULAR; INTRAVENOUS; SOFT TISSUE AS NEEDED
Status: DISCONTINUED | OUTPATIENT
Start: 2024-02-09 | End: 2024-02-09

## 2024-02-09 RX ORDER — CEFAZOLIN SODIUM 2 G/100ML
2 INJECTION, SOLUTION INTRAVENOUS ONCE
Status: COMPLETED | OUTPATIENT
Start: 2024-02-09 | End: 2024-02-09

## 2024-02-09 RX ORDER — LABETALOL HYDROCHLORIDE 5 MG/ML
INJECTION, SOLUTION INTRAVENOUS AS NEEDED
Status: DISCONTINUED | OUTPATIENT
Start: 2024-02-09 | End: 2024-02-09

## 2024-02-09 RX ORDER — OXYCODONE HYDROCHLORIDE 5 MG/1
5 TABLET ORAL EVERY 4 HOURS PRN
Status: DISCONTINUED | OUTPATIENT
Start: 2024-02-09 | End: 2024-02-09 | Stop reason: HOSPADM

## 2024-02-09 RX ORDER — SODIUM CHLORIDE 0.9 G/100ML
IRRIGANT IRRIGATION AS NEEDED
Status: DISCONTINUED | OUTPATIENT
Start: 2024-02-09 | End: 2024-02-09 | Stop reason: HOSPADM

## 2024-02-09 RX ORDER — SODIUM CHLORIDE, SODIUM LACTATE, POTASSIUM CHLORIDE, CALCIUM CHLORIDE 600; 310; 30; 20 MG/100ML; MG/100ML; MG/100ML; MG/100ML
100 INJECTION, SOLUTION INTRAVENOUS CONTINUOUS
Status: DISCONTINUED | OUTPATIENT
Start: 2024-02-09 | End: 2024-02-09 | Stop reason: HOSPADM

## 2024-02-09 RX ORDER — FENTANYL CITRATE 50 UG/ML
25 INJECTION, SOLUTION INTRAMUSCULAR; INTRAVENOUS EVERY 5 MIN PRN
Status: DISCONTINUED | OUTPATIENT
Start: 2024-02-09 | End: 2024-02-09 | Stop reason: HOSPADM

## 2024-02-09 RX ORDER — LIDOCAINE HYDROCHLORIDE 20 MG/ML
INJECTION, SOLUTION INFILTRATION; PERINEURAL AS NEEDED
Status: DISCONTINUED | OUTPATIENT
Start: 2024-02-09 | End: 2024-02-09

## 2024-02-09 RX ORDER — FENTANYL CITRATE 50 UG/ML
INJECTION, SOLUTION INTRAMUSCULAR; INTRAVENOUS AS NEEDED
Status: DISCONTINUED | OUTPATIENT
Start: 2024-02-09 | End: 2024-02-09

## 2024-02-09 RX ORDER — BUPIVACAINE HYDROCHLORIDE 5 MG/ML
INJECTION, SOLUTION PERINEURAL AS NEEDED
Status: DISCONTINUED | OUTPATIENT
Start: 2024-02-09 | End: 2024-02-09 | Stop reason: HOSPADM

## 2024-02-09 RX ORDER — ONDANSETRON HYDROCHLORIDE 2 MG/ML
INJECTION, SOLUTION INTRAVENOUS AS NEEDED
Status: DISCONTINUED | OUTPATIENT
Start: 2024-02-09 | End: 2024-02-09

## 2024-02-09 RX ORDER — ONDANSETRON HYDROCHLORIDE 2 MG/ML
4 INJECTION, SOLUTION INTRAVENOUS ONCE AS NEEDED
Status: DISCONTINUED | OUTPATIENT
Start: 2024-02-09 | End: 2024-02-09 | Stop reason: HOSPADM

## 2024-02-09 RX ORDER — PROPOFOL 10 MG/ML
INJECTION, EMULSION INTRAVENOUS AS NEEDED
Status: DISCONTINUED | OUTPATIENT
Start: 2024-02-09 | End: 2024-02-09

## 2024-02-09 RX ADMIN — HYDROMORPHONE HYDROCHLORIDE 0.5 MG: 1 INJECTION, SOLUTION INTRAMUSCULAR; INTRAVENOUS; SUBCUTANEOUS at 12:35

## 2024-02-09 RX ADMIN — SODIUM CHLORIDE, POTASSIUM CHLORIDE, SODIUM LACTATE AND CALCIUM CHLORIDE 100 ML/HR: 600; 310; 30; 20 INJECTION, SOLUTION INTRAVENOUS at 11:05

## 2024-02-09 RX ADMIN — ONDANSETRON 4 MG: 2 INJECTION, SOLUTION INTRAMUSCULAR; INTRAVENOUS at 11:25

## 2024-02-09 RX ADMIN — PROPOFOL 120 MG: 10 INJECTION, EMULSION INTRAVENOUS at 11:18

## 2024-02-09 RX ADMIN — FENTANYL CITRATE 50 MCG: 50 INJECTION, SOLUTION INTRAMUSCULAR; INTRAVENOUS at 12:13

## 2024-02-09 RX ADMIN — DEXAMETHASONE SODIUM PHOSPHATE 8 MG: 4 INJECTION, SOLUTION INTRAMUSCULAR; INTRAVENOUS at 11:25

## 2024-02-09 RX ADMIN — FENTANYL CITRATE 25 MCG: 50 INJECTION, SOLUTION INTRAMUSCULAR; INTRAVENOUS at 11:50

## 2024-02-09 RX ADMIN — HYDROMORPHONE HYDROCHLORIDE 0.5 MG: 1 INJECTION, SOLUTION INTRAMUSCULAR; INTRAVENOUS; SUBCUTANEOUS at 12:50

## 2024-02-09 RX ADMIN — PROPOFOL 30 MG: 10 INJECTION, EMULSION INTRAVENOUS at 11:21

## 2024-02-09 RX ADMIN — LIDOCAINE HYDROCHLORIDE 60 MG: 20 INJECTION, SOLUTION INFILTRATION; PERINEURAL at 11:18

## 2024-02-09 RX ADMIN — FENTANYL CITRATE 25 MCG: 50 INJECTION, SOLUTION INTRAMUSCULAR; INTRAVENOUS at 11:18

## 2024-02-09 RX ADMIN — OXYCODONE HYDROCHLORIDE 5 MG: 5 TABLET ORAL at 14:02

## 2024-02-09 RX ADMIN — FENTANYL CITRATE 25 MCG: 50 INJECTION, SOLUTION INTRAMUSCULAR; INTRAVENOUS at 12:23

## 2024-02-09 RX ADMIN — LABETALOL HYDROCHLORIDE 10 MG: 5 INJECTION, SOLUTION INTRAVENOUS at 12:15

## 2024-02-09 RX ADMIN — HYDROMORPHONE HYDROCHLORIDE 0.5 MG: 1 INJECTION, SOLUTION INTRAMUSCULAR; INTRAVENOUS; SUBCUTANEOUS at 12:42

## 2024-02-09 RX ADMIN — CEFAZOLIN SODIUM 2 G: 2 INJECTION, SOLUTION INTRAVENOUS at 11:25

## 2024-02-09 ASSESSMENT — COLUMBIA-SUICIDE SEVERITY RATING SCALE - C-SSRS
2. HAVE YOU ACTUALLY HAD ANY THOUGHTS OF KILLING YOURSELF?: NO
6. HAVE YOU EVER DONE ANYTHING, STARTED TO DO ANYTHING, OR PREPARED TO DO ANYTHING TO END YOUR LIFE?: NO
1. IN THE PAST MONTH, HAVE YOU WISHED YOU WERE DEAD OR WISHED YOU COULD GO TO SLEEP AND NOT WAKE UP?: NO

## 2024-02-09 ASSESSMENT — PAIN SCALES - GENERAL
PAINLEVEL_OUTOF10: 3
PAINLEVEL_OUTOF10: 7
PAIN_LEVEL: 0
PAINLEVEL_OUTOF10: 4
PAINLEVEL_OUTOF10: 8
PAINLEVEL_OUTOF10: 4
PAINLEVEL_OUTOF10: 10 - WORST POSSIBLE PAIN
PAINLEVEL_OUTOF10: 4
PAINLEVEL_OUTOF10: 6
PAINLEVEL_OUTOF10: 4
PAINLEVEL_OUTOF10: 4

## 2024-02-09 ASSESSMENT — PAIN - FUNCTIONAL ASSESSMENT
PAIN_FUNCTIONAL_ASSESSMENT: 0-10

## 2024-02-09 ASSESSMENT — PAIN DESCRIPTION - LOCATION: LOCATION: HAND

## 2024-02-09 ASSESSMENT — PAIN DESCRIPTION - DESCRIPTORS
DESCRIPTORS: ACHING
DESCRIPTORS: PRESSURE
DESCRIPTORS: ACHING
DESCRIPTORS: ACHING

## 2024-02-09 ASSESSMENT — PAIN DESCRIPTION - ORIENTATION: ORIENTATION: LEFT

## 2024-02-09 NOTE — DISCHARGE INSTRUCTIONS
Medication given may have significant effects after discharge. Therefore on the day of surgery:  1) You must be accompanied by a responsible adult upon discharge and for 24 hours after surgery.  Do not drive a motor vehicle, operate machinery, power tools or appliance, drink alcoholic beverages, or make critical decisions for 24 hours.  2) Be aware of dizziness, which may cause a fall. Change positions slowly.  3) Eating: you may resume your regular diet but it is better to increase intake slowly with mild foods and working up to your regular diet. No greasy, fried or spicy foods today.  4) Nausea/Vomiting: Nausea and vomiting may occur as you become more active or begin to increase food intake. If this should happen, decrease activity and return to liquids. If the problem persists, call your surgeon  5) Pain: Your surgeon may have given you a prescription for pain medication. Take pain medication with food as prescribed. Pain medication may cause constipation, so drink plenty of fluids. If your pain medication does not provide adequate relief, call your surgeon  6) Urinating: Notify your surgeon if you have not urinated within 12 hours after discharge  7) Ice: Apply ice to operative site for 20 min 5-6 times a day or use Polar care as instructed  8) Dressing:   []  Remove dressing in 3 Days   []  Leave open to air or apply simple Band-Aid after initial dressing is taken off and incision is dry. (If Steri-Strips are applied, leave them in place.)   []  No baths, hots tubs, pools, or submerging in fresh water sources. Okay to begin showering and normal hand washing after dressing removal.     [x]  Leave dressing in place. Keep dressing/ incision clean and dry.      9) Activity:    Shoulder/ Elbow/Hand:   [x]  Elevate extremity    [x]  Sling   [] At all times (except for exercises and showering)  [x] As needed only for comfort   [] Begin daily motion exercises out of sling as instructed   [x]  Bend and flex  fingers/wrist/elbow frequently   [x] Non-weight bearing/No lifting/gripping/squeezing to the surgical limb   [] No lifting greater than 1 lb until follow-up visit      10) Begin physical therapy if advised by your physician:   [] Before returning to see you doctor    [x] Will discuss possible need at follow-up visit   [] Will be paired with your follow-up visit in [place]    11) Call your doctor at [phone] for an appointment (or follow up as scheduled)    Contact [facility] [place] [facility] office if  Increased redness, swelling, drainage of any kind, and/or pain to surgery site.  As well as new onset fevers and or chills.  These could signify an infection.  Calf or thigh tenderness to touch as well as increased swelling or redness.  This could signify a clot formation.  Numbness or tingling to an area around the incision site or below the incision site (toes). Or if the operative extremity becomes cold, blue.  Any rash appears, increased  or new onset nausea/vomiting occur.  This may indicate a reaction to a medication.  Temp is 38.5 C (101F)  12) If you have any concerns or questions, please call [facility] surgeon on call. The 24- hour phone is [phone]  13) If you are unable to contact your surgeon, in an emergency situation, go to the nearest hospital      General Anesthesia Discharge Instructions    About this topic  You may need general anesthesia if you need to be asleep during a procedure. Your doctor will use drugs to block the signals that go from your nerves to your brain. Doctors give general anesthesia during a surgery or procedure to:  Allow you to sleep  Help your body be still  Relax your muscles  Help you to relax and be pain free  Keep you from remembering the surgery  Let the doctor manage your airway, breathing, and blood flow  The doctor or nurse anesthetist gives general anesthesia by a shot into your vein. Sometimes, you may breathe in a gas through a mask placed over your  face.  What care is needed at home?  Ask your doctor what you need to do when you go home. Make sure you ask questions if you do not understand what the doctor says.  Your doctor may give you drugs to prevent or treat an upset stomach from the anesthetic. Take them as ordered.  If your throat is sore, suck on ice chips or popsicles to ease throat pain.  Put 2 to 3 pillows under your head and back when you lie down to help you breathe easier.  For the first 24 to 48 hours:  Do not operate heavy or dangerous machinery.  Do not make major decisions or sign important papers. You may not be able to think clearly.  Avoid beer, wine, or mixed drinks.  You are at a higher risk of falling for at least 24 hours after general anesthesia.  Take extra care when you get up.  Do not change positions quickly.  Do not rush when you need to go to the bathroom or to answer the phone.  Ask for help if you feel unsteady when you try to walk.  Wear shoes with non-slip soles and low heels.  What follow-up care is needed?  Your doctor may ask you to come back to the office to check on your progress. Be sure to keep these visits.  If you have stitches that do not dissolve or staples, you will need to have them removed. Your doctor will want to do this in 1 to 2 weeks. If the doctor used skin glue, the glue will fall off on its own.  What drugs may be needed?  The doctor may order drugs to:  Help with pain  Treat an upset stomach or throwing up  Will physical activity be limited?  You will not be allowed to drive right away after the procedure. Ask a family member or a friend to drive you home.  Avoid trying to get out of bed without help until you are sure of your balance.  You may have to limit your activity. Talk to your doctor about if you need to limit how much you lift or limit exercise after your procedure.  What changes to diet are needed?  Start with a light diet when you are fully awake. This includes things that are easy to  swallow like soups, pudding, jello, toast, and eggs. Slowly progress to your normal diet.  What problems could happen?  Low blood pressure  Breathing problems  Upset stomach or throwing up  Dizziness  Blood clots  Infection  When do I need to call the doctor?  Trouble breathing  Upset stomach or throwing up more than 3 times in the next 2 days  Dizziness  Teach Back: Helping You Understand  The Teach Back Method helps you understand the information we are giving you. After you talk with the staff, tell them in your own words what you learned. This helps to make sure the staff has described each thing clearly. It also helps to explain things that may have been confusing. Before going home, make sure you can do these:  I can tell you about my procedure.  I can tell you if I need to follow up with my doctor.  I can tell you what is good for me to eat and drink the next day.  I can tell you what I would do if I have trouble breathing, an upset stomach, or dizziness.  Where can I learn more?  [employer] [place] [employer]  https://www.nigms.nih.gov/education/pages/factsheet_Anesthesia.aspx  NHS Choices  http://www.nhs.uk/conditions/Anaesthetic-general/Pages/Definition.aspx  Last Reviewed Date  2020-04-22  Dealing with Constipation from the Drugs You Take    About this topic  Sometimes the drugs you take can cause constipation. This can also be called a side effect. Constipation is when your bowel movements are too hard, too small, hard to get out, or happen less than 3 times a week. Many kinds of drugs often cause problems with constipation. You may have problems with constipation if you take drugs to treat:  Pain  Low red blood cell count  Problems like Parkinson disease, bladder problems, or breathing problems  If you are on drugs to treat these conditions, you may need to find ways to prevent constipation. It is easier to prevent constipation than to treat it, after it starts.  General  Most of these  drugs slow down how fast food moves through your stomach and bowels. This makes your stool harder and more difficult to pass. Also, your bowel movements may be small or you may have them less often. You may have stomach or back pain and feel bloated. You may feel like you cannot empty your bowels all the way.  Taking a laxative can help prevent problems with constipation. There are a few kinds of laxatives like:  Stool softener. This will help make your stool wetter and softer. A stool softener will not stimulate your bowel in any way.  Bulk adding. This helps your stool hold more water and makes your stool bigger. This also stimulates your bowels to pass stool.  Lubricant. This coats your bowel and stool to make it easier to pass. It also helps prevent water loss from your stool to your bowels.  Hyperosmotic or saline. This draws more water into the stool to make it wetter and softer.  Stimulant. This increases movement in your bowels.  Laxatives come in different forms. They are available in pills, powders, capsules, liquids, suppositories, and enemas. Talk with your doctor about the best laxative for you.  What drugs may be needed?  The doctor may order drugs to:  Help you move your bowels  Soften your stools  Will physical activity be limited?  Physical activity, like going for a walk, may help get your bowels moving.  What changes to diet are needed?  Eat high fiber foods like whole grains, fruits, and vegetables.  Stay away from sugars and fats. Limit sweets and fatty foods, such as desserts, fried foods, and chips. Eat good fats found in fish, nuts, avocados, and oils like olive oil and canola oil. Cut back on solid fats (butter, lard, margarine).  What problems could happen?  Rectal bleeding  Hemorrhoids  Tears around the skin of the anus  Hard stool may pack the large bowels very tightly. If this happens, the normal pushing action of the bowels is not enough to remove the stool. This is called fecal  impaction.  Loose, runny stools  What can be done to prevent this health problem?  Drink 6 to 8 glasses of water each day.  Set a regular time to pass stools. Do not ignore the urge to have a bowel movement. Give yourself plenty of time and privacy to have a bowel movement.  Sit in a warm bath to help you relax. This may help you feel like you need to have a bowel movement.  Exercise regularly.  When do I need to call the doctor?  Seek care right away or go to the ER if you have:  Lots of rectal bleeding  Sagging of the rectum  Very bad belly pain with hard stools, a fever of 100.4°F (38°C) or higher, and chills  Call your doctor if you have:  Change in bowel habits (hard stools alternating with loose stools)  Very bad pain in the anus during a bowel movement  Hemorrhoids  White or chalk-colored stools  Throwing up with constipation  Cracks or a tear in the lining of your anus  Hard stools for more than 2 weeks without help from home remedies or with belly pain  Last Reviewed Date  2021-08-13  Using Cold for Pain    About this topic  Cold is one of a few ways to help manage pain. Cold lowers your body temperature and lessens blood flow to the area. This lessens swelling and lowers activity in your nerves. This means you may have less pain signals to your brain.  Cold therapy can help many problems like:  Arthritis  Tendinitis  Muscle and ligament sprains and strains  Muscle spasms  Headaches and migraines  Dermatitis  Delayed onset muscle soreness. This happens after a strenuous work out.  General  Here are ways you can use cold at home:  Ice packs - Place an ice pack or a bag of frozen peas wrapped in a towel over the sore part. Never put ice right on the skin. Do not leave the ice on more than 10 to 15 minutes at a time.  Ice massage - Move a small cup of ice or an ice cube back and forth over the sore part.  Coolant gels or sprays - These can be bought over the counter and applied on the skin around the sore  part  Cold compress - Wet a washcloth with very cold water or ice water and then put it on the sore part.  Chemical cold packs - You can buy these single use packs in stores.  Ice bath - This is inserting your sore part into a container of icy water for a short time.  Cold therapy units - Often, you use these after you have an orthopedic surgery. You plug the unit in and fill it with ice water. Then you wrap a special sleeve around your sore part and the unit circulates cold water through the sleeve.  Do not use cold or talk to your doctor first if you:  Are pregnant  Have problems with sensation or feeling or the part is already numb  Have Raynaud syndrome  Have conditions like diabetes, heart problems, or blood pressure problems  Have trouble with blood vessel or blood clots  Have metal implants  Have skin problems like blisters, open sores, or dermatitis  You are very sensitive to cold  What problems could happen?  Frostbite  Nerve damage  Increased swelling  Numbness and tingling  Skin irritation  Ongoing pain  Helpful tips  Do not leave the ice on for more than 10 to 15 minutes at a time.  Try making your own ice pack at home. Mix one cup of rubbing alcohol with 2 cups of water and freeze it in a bag.  Use a bag of frozen vegetables to get ice around curvy body parts like shoulders or ankles.  Call your doctor if the area under the cold pack has changed colors, has blisters, or swells.  Last Reviewed Date  2020-06-02  How to care for a splint    The Basics  Written by the doctors and editors at [facility]  Why do doctors use a splint? -- Splints can be used to treat broken bones (fractures), sprains, and other injuries. They limit movement, reduce pain, and protect the injured body part as it heals.  Unlike a cast, a splint does not completely cover the body part. A splint can be made of:  ? Plaster or fiberglass that is molded to the body part, like a cast  ? Pre-molded splints made of plastic or metal that is  padded and might be covered with cloth  Some splints are put on like a cast and use an elastic bandage to keep them in place. Other splints are more like a glove or boot, and use straps to keep them in place.  In some cases, the splint stays on until a doctor removes it. Other times, splints can be removed at certain times. For some injuries, doctors might put on a splint first, and then a cast later.  It's important to take care of a splint so that the skin underneath doesn't get hurt or infected.  What can I do to help with pain during the first few days? -- To help with pain under the split during the first few days, you can:  ? Put ice on the splint - Use a cold gel pack, bag of ice, or bag of frozen vegetables every 1 to 2 hours, for 15 minutes each time. Do not put the ice (or other cold object) directly on the skin.  ? Keep the splint raised (for example, on pillows) to help reduce swelling - To reduce swelling and pain, the splint needs to be raised above the level of the heart.  ? Take medicine to relieve the pain - If the doctor prescribed pain-relieving medicine, you can use that. You can also ask the doctor or nurse about taking over-the-counter medicines, such as acetaminophen (sample brand name: Tylenol) or ibuprofen (sample brand names: Advil, Motrin).  What if the splint feels too tight? -- If the splint feels too tight, it might be because of swelling. If swelling becomes severe, it can make it hard to move the fingers or toes. Or the fingers or toes might be blue, gray, or cold to the touch.  If the splint is too tight, you can:  ? Raise the limb above the level of the heart - This will help reduce swelling. You can prop the limb up on pillows.  ? Put ice on the splint, as described above.  ? If elevation and ice do not decrease swelling, loosen the wrap that holds the splint in place, but be sure that it still holds the splint in place.  If the splint still feels too tight after trying these  steps, call the doctor or nurse.  Can a splint get wet? -- It depends on what the splint is made of.  Sometimes, a splint can be removed for bathing. If so, remove the splint carefully and dry the skin before putting the splint back on. If the splint is wet, use a clean cloth or hairdryer set on cool to dry it before putting the splint back on.  If the splint needs to stay dry, you can:  ? Cover the splint with a plastic bag for bathing. Secure the bag with tape or a rubber band to keep it dry. You can also buy a cast or splint cover to use (figure 1).  ? Keep the splint outside of the tub or shower when bathing. Take extra care because the splint can make it harder to walk, stand, or grab onto things. This raises the risk of falling.  If the splint or skin under the splint gets wet and you cannot remove the splint, use a hair dryer to blow cool air inside the splint.  What else should I know?  ? Wear the splint as instructed.  ? Move or wiggle your fingers or toes often. Moving the joints near the splint can help avoid stiffness.  ? Do not walk or put weight on the splint unless the doctor says that it is OK. Use crutches, a walker, or a sling if the doctor recommends this.  ? Do not put anything under the splint to scratch the skin. Instead, if the skin itches, use a fan or hairdryer set on cool to blow air into the splint.  ? Keep dirt, dust, or sand from getting inside the splint.  ? Do not use lotion or powder inside the splint.  ? Do not trim or break off rough edges from the splint without checking with the doctor first. You might be able to use a nail file to smooth any rough edges on the splint.  ? Do not pull out the padding from inside the splint.  When should I call the doctor? -- Call for advice if:  ? The pain is severe or is getting worse.  ? The splint is still too tight after taking the steps above.  ? There are sores or cuts on your skin under the splint.  ? There is a crack in the splint, the  splint becomes soft, or the splint is too loose.  ? There is less movement or feeling in your fingers or toes.  ? The splint smells bad.  ? The splint becomes soaking wet.  All topics are updated as new evidence becomes available and our peer review process is complete.  This topic retrieved from [place] on: Feb 03, 2024.  Topic 903378 Version 5.0  Release: 32.1.4 - C32.33  © 2024 [employer] and/or its affiliates. All rights reserved.  figure 1: Using plastic bags to keep a cast dry

## 2024-02-09 NOTE — ANESTHESIA PROCEDURE NOTES
Airway  Date/Time: 2/9/2024 11:18 AM  Urgency: elective    Airway not difficult    Staffing  Performed: CRNA   Authorized by: Yrn Massey MD    Performed by: DONTAE Santana-ASHLEY  Patient location during procedure: OR    Indications and Patient Condition  Indications for airway management: anesthesia  Spontaneous ventilation: present  Sedation level: deep  Preoxygenated: yes  MILS maintained throughout  Mask difficulty assessment: 0 - not attempted  Planned trial extubation    Final Airway Details  Final airway type: supraglottic airway      Successful airway: classic  Size 4     Number of attempts at approach: 1

## 2024-02-09 NOTE — OP NOTE
OPEN TREATMENT AND PINNING OF LEFT LONG FINGER AND LEFT SMALL FINGER PROXIMAL PHALANX FRACTURE (L) Operative Note     Date: 2024  OR Location: [facility]    Name: Smita Tee, : 1950, Age: 73 y.o., MRN: [id number], Sex: female            Preoperative diagnosis: Displaced unstable left small finger and left long finger proximal phalanx fractures.  Left fourth MCP dislocation.    Postoperative diagnosis: Same    Procedure planned: Closed reduction percutaneous pinning left small finger proximal phalanx fracture.  Open reduction with internal fixation left long finger proximal phalanx fracture.  Closed treatment with manipulation under anesthesia left fourth MCP dislocation.    Procedure performed: Same    Surgeon: Ant Cruz D.O.    Assistant: MERVIN Barr  The physician assistant was present to the entire case. Given the nature of the disease process and the procedure to be performed a skilled surgical assistant was necessary during the case. The assistant was necessary in order to hold retractors and directly assist in the operation. A certified scrub tech was at the back table managing instruments and supplies for the surgical case.    Anesthesia: General    Estimated blood loss: Less than 10 cc    Drains: None    Tourniquet: None    Specimens: None    Implants: K wires    Indications for procedure: The patient sustained injury to the left hand.  She sustained proximal phalanx fractures of left long and left small fingers.  She had a fourth MCP dislocation.  Patient was seen in the office.  Treatment options were discussed including operative and nonoperative strategies.  Recommendations were made for close reduction percutaneous pinning versus open reduction with internal fixation of the fractures with manipulation of the dislocation.  The patient was agreeable.  Informed consent was signed and placed in the chart.    Complications: None noted at the time of surgery    Description of  procedure: The patient was taken to the operative suite and placed in the supine position on the operating table.  A timeout was performed and the left hand confirmed to be the operative site.  The patient was carefully positioned on the table in such a fashion as to pad all bony prominences and peripheral nerves.  The patient was administered appropriate IV antibiotics and general anesthesia.  The patient was prepped and draped in the normal sterile fashion.  After prepping and draping fluoroscopic imaging was brought in.  Attention was first turned to the left long finger.  The left long finger showed extra-articular P1 base fracture with profound displacement.  Indirect forces and direct forces applied outside of the skin were insufficient to achieve adequate reduction.  Small stab incisions were made to allow for introduction of a bone reduction forceps which was introduced directly down to bone.  The clamp was applied in such a way as to perform adequate reduction.  A 4 5 K wire was then started at the P1 ulnar base and delivered in an antegrade fashion working from ulnar to radial traversing the fracture plane and anchoring into the radial portion of the shaft segment.  A second 4 5 K wire was placed this time with a more palmar start point again proceeding antegrade working from ulnar to radial traversing the fracture plane and anchoring into the distal segment in a subchondral fashion.  An additional K wire was placed this time starting at the radial base of P1 to the long finger again proceeding antegrade this time working radial to ulnar traversing the fracture plane and anchoring into the ulnar portion of the shaft segment.  A fourth pin was then placed, given the complexity and instability of the fracture pattern, again in an antegrade fashion again working from radial to ulnar starting at the P1 radial base traversing the fracture plane and anchoring into the distal segment finalizing construct for open  reduction with internal fixation to left long finger P1 fracture.  Attention was then turned to the left small finger.  The patient showed intra-articular fracture of the P1 base with minimal articular disturbance.  There was apex volar angulation.  Reduction techniques were imparted to achieve adequate close reduction.  A 4 5 K wire was then started at the ulnar base of the left small finger proximal phalanx and delivered in antegrade fashion traversing the fracture plane and anchoring into the radial portion of the shaft segment.  A retrograde K wire was then placed this time starting about the ulnar portion of the shaft segment and working from ulnar to radial traversing the fracture plane and anchoring into the radial base.  This finalized the construct for close reduction percutaneous pinning to left small finger proximal phalanx fracture.  The fourth MCP dislocation was stressed and while we could subluxate dorsally there was no patrica instability as long as the MCP was held in flexion.  It was deemed most appropriate to avoid transarticular K wire fixation to stabilize as it was felt that it was inherently stable once reduced.  Pins were capped with Camelia balls.  Bulky soft dressing and splint were placed.  The patient was allowed to arise from anesthesia and had recovery in stable condition.  Overall the patient tolerated the procedure well.    Disposition: Stable to PACU                  Ant Cruz  Phone Number: [phone]

## 2024-02-09 NOTE — ANESTHESIA POSTPROCEDURE EVALUATION
Patient: Smita Tee    Procedure Summary       Date: 02/09/24 Room / Location: [facility] 04 / [facility]    Anesthesia Start: 1110 Anesthesia Stop: 1215    Procedure: OPEN TREATMENT AND PINNING OF LEFT LONG FINGER AND LEFT SMALL FINGER PROXIMAL PHALANX FRACTURE (Left: Fingers) Diagnosis:       Displaced fracture of proximal phalanx of left middle finger, initial encounter for closed fracture      Displaced fracture of proximal phalanx of left little finger, initial encounter for closed fracture      Dislocation of metacarpal (bone), proximal end of left hand, initial encounter      Pre-op exam      (Displaced fracture of proximal phalanx of left middle finger, initial encounter for closed fracture [S62.613A])      (Displaced fracture of proximal phalanx of left little finger, initial encounter for closed fracture [S62.617A])      (Dislocation of metacarpal (bone), proximal end of left hand, initial encounter [S63.065A])      (Pre-op exam [Z01.818])    Surgeons: Ant Cruz DO Responsible Provider: Yrn Massey MD    Anesthesia Type: general ASA Status: 3            Anesthesia Type: general    Vitals Value Taken Time   /86 02/09/24 1214   Temp 37 02/09/24 1217   Pulse 84 02/09/24 1216   Resp 13 02/09/24 1216   SpO2 100 % 02/09/24 1216   Vitals shown include unvalidated device data.    Anesthesia Post Evaluation    Patient location during evaluation: bedside  Patient participation: complete - patient participated  Level of consciousness: awake and alert  Pain score: 0  Pain management: adequate  Airway patency: patent  Cardiovascular status: acceptable, blood pressure returned to baseline and hemodynamically stable  Respiratory status: acceptable, nonlabored ventilation, face mask and spontaneous ventilation  Hydration status: acceptable  Postoperative Nausea and Vomiting: none        There were no known notable events for this encounter.

## 2024-02-11 LAB
ATRIAL RATE: 62 BPM
P AXIS: 15 DEGREES
P OFFSET: 198 MS
P ONSET: 144 MS
PR INTERVAL: 140 MS
Q ONSET: 214 MS
QRS COUNT: 10 BEATS
QRS DURATION: 94 MS
QT INTERVAL: 434 MS
QTC CALCULATION(BAZETT): 440 MS
QTC FREDERICIA: 439 MS
R AXIS: -32 DEGREES
T AXIS: -12 DEGREES
T OFFSET: 431 MS
VENTRICULAR RATE: 62 BPM

## 2024-02-12 DIAGNOSIS — S62.613A DISPLACED FRACTURE OF PROXIMAL PHALANX OF LEFT MIDDLE FINGER, INITIAL ENCOUNTER FOR CLOSED FRACTURE: Primary | ICD-10-CM

## 2024-02-12 DIAGNOSIS — S63.269A: ICD-10-CM

## 2024-02-12 DIAGNOSIS — S62.617A DISPLACED FRACTURE OF PROXIMAL PHALANX OF LEFT LITTLE FINGER, INITIAL ENCOUNTER FOR CLOSED FRACTURE: ICD-10-CM

## 2024-02-21 NOTE — PROGRESS NOTES
Occupational Therapy    Occupational Therapy Evaluation    Name: Smita Tee  MRN: [id number]  : 1950   DATE: 24   Time Calculation  Start Time: 0230  Stop Time: 0310  Time Calculation (min): 40 min     Insurance Authorization Request:  [facility] DUAL BMN PT OT ST //  COPAY 20 DED 0 COVERAGE 100 OOP 3500(0) NO AUTH REQ     Assessment:  Patient is a 73 y.o. female who is 13 days s/p left SF p1 pinning, left MF P1 pining, and closed reduction of left 4th MCP joint. Patient speaks [place] primarily, her adult daughter was presented to help with translation.     Patient presented with pain, edema, joint stiffness, capsular tightness, and muscle weakness. She resides alone home independently prior to this injury, now her nephew is staying with her to help. She is currently retired. Meaningful leisure activities are cleaning and gardening. Educated patient on pin infection precautions and retrograde edema mob along digits and dorsum of hand. Also fabricated patient hand based clam shell orthosis to wear to protect pins. Patient will benefit from skilled OT for pain/edema management and ROM to support return to all ADL/ IADL and leisure activities.     Plan:  Outpatient Plan  OT Plan: Modalities, therapeutic exercise, thereapeutic activity, manual therapy, neuromotor re-education  Frequency: 1x/wk  Duration: 6 weeks  Rehab Potential: Good  Plan of Care Agreement: Patient    Subjective   Current Problem:  Displaced unstable left small finger and left long finger proximal phalanx fractures. Left fourth MCP dislocation.      DOI: 2024    Surgical Procedure: Closed reduction percutaneous pinning left small finger proximal phalanx fracture. Open reduction with internal fixation left long finger proximal phalanx fracture. Closed treatment with manipulation under anesthesia left fourth MCP dislocation.   DOS: 2024  Hand Dominance: right   Affected Extremity: left    Mechanism of Injury: Patient fell while  "holding objects in her hands and     Precautions:    Pain Assessment:  Location: Dorsum of left hand  6/10 at rest, 9/10 at highest  Description: stabbing      Homeliving/Social Support: Living home alone. Nephew lives there to help now.     Work: Retired     Meaningful Activities: Cleaning and gardening     Previous Level of Function Per Patient/Caregiver Report: Independent with ADL, IADL and leisure activities    Chief complaint: pain, stiffness     Patient stated goals for therapy: \"To get back to normal as much as I can.\"       Objective   UE Assessment  Observation: Mod edema presented in left digits and dorsum of hand. Pins intact. No sign of infection.     Palpation: No lifting, no WB    Range of Motion (in degrees)  Shoulder AROM: WNL     PROM: WNL    Elbow AROM: WNL      PROM: WNL    Wrist AROM: flex 10 ext 5      PROM:    Digits AROM: Limited by pins positioning  PROM: TBT    Thumb AROM: WNL   PROM: WNL    Sensation: No paresthesia      Strength: TBT    strength: R  L  Lateral pinch strength: R  L  3 point pinch strength: R  L     Special Test: N/A    Treatment Performed: Fabricated patient a hand based clam shell orthosis - MF-SF MCP in slight flexion (patient was able to tolerate flexion due to pin positioning), PIPs are free. Orthosis was secured with Velcro.     Outcome Measures:  Quick Dash Score: at eval - will complete next visit     OP EDUCATION:  Education  Individual(s) Educated: Patient  Education Provided: Monitor for infection, Edema control, Risk and benefits of OT discussed with patient or other, POC discussed and agreed upon  Home Program: Orthotic wearing schedule, care and precautions  Risk and Benefits Discussed with Patient/Caregiver/Other: yes  Patient/Caregiver Demonstrated Understanding: yes  Plan of Care Discussed and Agreed Upon: yes  Patient Response to Education: Patient/Caregiver Verbalized Understanding of Information, Patient/Caregiver Performed Return Demonstration of " Exercises/Activities, Patient/Caregiver Asked Appropriate Questions     Goals:  By discharge (6 weeks) Smita Tee  will achieve the following goals:     1. Patient to demonstrate AROM digits into loose composite fist for dressing and grooming  2. Patient to report pain 0/10 at rest for sleeping  3. Patient to lift and carry 5# with left hand with no difficulty for gardening tasks  4. Patient to demonstrate  strength left hand to be 70% of dominant side for household chores    5. Patient to demonstrate proper technique and competence with HEP   6. Patient to score disability rate less than 10% on Quick DASH

## 2024-02-22 ENCOUNTER — EVALUATION (OUTPATIENT)
Dept: OCCUPATIONAL THERAPY | Facility: CLINIC | Age: 74
End: 2024-02-22
Payer: COMMERCIAL

## 2024-02-22 ENCOUNTER — HOSPITAL ENCOUNTER (OUTPATIENT)
Dept: RADIOLOGY | Facility: CLINIC | Age: 74
Discharge: HOME | End: 2024-02-22
Payer: COMMERCIAL

## 2024-02-22 ENCOUNTER — OFFICE VISIT (OUTPATIENT)
Dept: ORTHOPEDIC SURGERY | Facility: CLINIC | Age: 74
End: 2024-02-22
Payer: COMMERCIAL

## 2024-02-22 DIAGNOSIS — S62.617D CLOSED DISPLACED FRACTURE OF PROXIMAL PHALANX OF LEFT LITTLE FINGER WITH ROUTINE HEALING: ICD-10-CM

## 2024-02-22 DIAGNOSIS — S62.617A DISPLACED FRACTURE OF PROXIMAL PHALANX OF LEFT LITTLE FINGER, INITIAL ENCOUNTER FOR CLOSED FRACTURE: ICD-10-CM

## 2024-02-22 DIAGNOSIS — S62.613D CLOSED DISPLACED FRACTURE OF PROXIMAL PHALANX OF LEFT MIDDLE FINGER WITH ROUTINE HEALING: Primary | ICD-10-CM

## 2024-02-22 DIAGNOSIS — S62.613A DISPLACED FRACTURE OF PROXIMAL PHALANX OF LEFT MIDDLE FINGER, INITIAL ENCOUNTER FOR CLOSED FRACTURE: ICD-10-CM

## 2024-02-22 PROCEDURE — 73130 X-RAY EXAM OF HAND: CPT | Mod: LT

## 2024-02-22 PROCEDURE — 4010F ACE/ARB THERAPY RXD/TAKEN: CPT | Performed by: ORTHOPAEDIC SURGERY

## 2024-02-22 PROCEDURE — 1159F MED LIST DOCD IN RCRD: CPT | Performed by: ORTHOPAEDIC SURGERY

## 2024-02-22 PROCEDURE — 97165 OT EVAL LOW COMPLEX 30 MIN: CPT | Mod: GO | Performed by: OCCUPATIONAL THERAPIST

## 2024-02-22 PROCEDURE — 73130 X-RAY EXAM OF HAND: CPT | Mod: LEFT SIDE | Performed by: ORTHOPAEDIC SURGERY

## 2024-02-22 PROCEDURE — 1125F AMNT PAIN NOTED PAIN PRSNT: CPT | Performed by: ORTHOPAEDIC SURGERY

## 2024-02-22 PROCEDURE — 99024 POSTOP FOLLOW-UP VISIT: CPT | Performed by: ORTHOPAEDIC SURGERY

## 2024-02-22 PROCEDURE — L3913 HFO W/O JOINTS CF: HCPCS | Performed by: OCCUPATIONAL THERAPIST

## 2024-02-22 PROCEDURE — 1036F TOBACCO NON-USER: CPT | Performed by: ORTHOPAEDIC SURGERY

## 2024-02-22 ASSESSMENT — PAIN SCALES - GENERAL: PAINLEVEL_OUTOF10: 6

## 2024-02-22 ASSESSMENT — PAIN DESCRIPTION - DESCRIPTORS: DESCRIPTORS: STABBING

## 2024-02-22 ASSESSMENT — PAIN - FUNCTIONAL ASSESSMENT: PAIN_FUNCTIONAL_ASSESSMENT: 0-10

## 2024-02-22 NOTE — PROGRESS NOTES
2/22/2024    Chief Complaint   Patient presents with    Left Hand - Follow-up     Open treatment and pinning of long and small finger proximal phalanx fracture 2/9/24 , 2 weeks out  Xray today       History of Present Illness:  Patient Smita Tee , 73 y.o. female, presents today, 2/22/2024, for evaluation of left middle finger and little finger  proximal phalanx fractures, 2 weeks out from closed reduction percutaneous pin fixation .  Patient is here today with her daughter who helps act as .  She states she is done well in the interim since surgery.  She has minimal soreness discomfort, she has had some itching of the hand she feels was related to splint immobilization.  Overall things are improving.  Denies any new injury or trauma.  She kept her postoperative splint on clean, dry, intact.       Review of Systems:   GENERAL: Negative  GI: Negative  MUSCULOSKELETAL: See HPI  SKIN: Negative  NEURO:  Negative     Physical Exam:  GENERAL:  Alert and oriented to person, place, and time.  No acute distress and breathing comfortably; pleasant and cooperative with the examination.  HEENT:  Head is normocephalic and atraumatic.  NECK:  Supple, no visible swelling.  CARDIOVASCULAR:  No palpable tachycardia.  LUNGS:  No audible wheezing or labored breathing.  ABDOMEN:  Nondistended.  Extremities: The surgical incision is clean, dry, intact, and appears to be healing well.  No active bleeding, erythema, warmth, drainage, or signs of infection.  Appropriate functional ROM demonstrated with flexion/extension of the digits, and flexion/extension/pronosupination of the wrist.  Moderate swelling is noted diffusely throughout the hand with stiffness to the digits.     Imaging/Test Results:  3 views of the left hand taken in the [place] office today show evidence of proximal phalanx fracture of the left long and small finger status post closed reduction percutaneous pin fixation.  Continued adequate alignment  throughout all 3 planes.     Assessment:  Left long and small finger proximal phalanx fractures, 2 weeks out from closed reduction with percutaneous pin fixation.       Plan:  Recommendations made continue nonweightbearing left upper extremity.  She has occupational therapy scheduled immediately following today's visit for edema management, range of motion recovery, custom splint fabrication.  Follow-up with our office on 3/11/2024 for repeat clinical exam, x-rays 3 views of the left hand upon return.  We discussed pin removal at that time.  They are planning to travel to [place] on 3/12/2024.  All questions answered at today's visit.    Felicita Mcelroy PA-C

## 2024-03-08 ENCOUNTER — TREATMENT (OUTPATIENT)
Dept: OCCUPATIONAL THERAPY | Facility: CLINIC | Age: 74
End: 2024-03-08
Payer: COMMERCIAL

## 2024-03-08 DIAGNOSIS — S62.613D CLOSED DISPLACED FRACTURE OF PROXIMAL PHALANX OF LEFT MIDDLE FINGER WITH ROUTINE HEALING: Primary | ICD-10-CM

## 2024-03-08 DIAGNOSIS — S62.617D CLOSED DISPLACED FRACTURE OF PROXIMAL PHALANX OF LEFT LITTLE FINGER WITH ROUTINE HEALING: ICD-10-CM

## 2024-03-08 PROCEDURE — 97110 THERAPEUTIC EXERCISES: CPT | Mod: GO | Performed by: OCCUPATIONAL THERAPIST

## 2024-03-08 PROCEDURE — 97140 MANUAL THERAPY 1/> REGIONS: CPT | Mod: GO | Performed by: OCCUPATIONAL THERAPIST

## 2024-03-08 NOTE — PROGRESS NOTES
"  Occupational Therapy     Occupational Therapy Treatment  Name: Smita Tee   MRN: [id number]   : 1950   Date:  2024    Time Calculation  Start Time: 218  Stop Time: 245  Time Calculation (min): 27 min      OT Therapeutic Procedures Time Entry  Manual Therapy Time Entry: 7  Therapeutic Exercise Time Entry: 20        Current Problem:   Displaced unstable left small finger and left long finger proximal phalanx fractures. Left fourth MCP dislocation.       Visit Number: 2    Insurance Authorization Request: [facility] DUAL BMN PT OT ST //  COPAY 20 DED 0 COVERAGE 100 OOP 3500(0) NO AUTH REQ      Assessment:  Patient is progressing appropriately demonstrated by decreased edema and pain in left hand. She remains limited by edema, joint stiffness, capsular tightness, and muscle weakness. Added PIP and DIP isolated and composite PROM exercises to today's session to decrease stiffness. Patient will continue to benefit from skilled OT for ROM and later progress to strengthening to support full return to all ADL/IADL and leisure activities.       Plan:  Outpatient Plan  OT Plan: Modalities, therapeutic exercise, thereapeutic activity, manual therapy, neuromotor re-education  Frequency: 1x/wk  Duration: 6 weeks  Rehab Potential: Good  Plan of Care Agreement: Patient      Subjective   General: \"I massage everyday and my swelling is a lot better.\"    Precautions: no heavy lifting, no WB    Pain Assessment:  Location: left hand   0/10 at rest, 5/10 at highest  Description: dull      HEP Adherence/Understanding: Good     Objective  UE Assessment  Observation: min edema presented in left digits and dorsum of hand. Pins intact. No sign of infection.      Palpation: No lifting, no WB     Range of Motion (in degrees)  Shoulder AROM: WNL     PROM: WNL     Elbow AROM: WNL      PROM: WNL     Wrist AROM: flex 15 (was 10) ext 15 (was 5)     PROM: TBT     Digits AROM: IF-SF flexion PIP 15 DIP 0 PROM: IF-SF flexion PIP 30  DIP " 15     Thumb AROM: WNL   PROM: WNL     Sensation: No paresthesia      Strength: TBT    strength: R  L  Lateral pinch strength: R  L  3 point pinch strength: R  L      Special Test: N/A     Treatment Interventions:   Therapeutic Exercise  Therapeutic Exercise Performed: Yes    Therapist completed PROM of IF-SF DIP and PIP into isolated and composite flexion using low load long duration technique   [place] assisted MCP to stay in extension while patient actively perform hook   Adjusted ulnar gutter orthosis to allow full PIP motion for P/AROM exercises at home     Manual Therapy Performed: Yes    Therapist completed retrograde edema mob      Tolerance of session: No difficulty     Outcome Measures:  Quick Dash Score: TBT     OP EDUCATION:  Education  Individual(s) Educated: Patient  Home Program: AROM, PROM  Risk and Benefits Discussed with Patient/Caregiver/Other: yes  Patient/Caregiver Demonstrated Understanding: yes  Plan of Care Discussed and Agreed Upon: yes  Patient Response to Education: Patient/Caregiver Verbalized Understanding of Information, Patient/Caregiver Performed Return Demonstration of Exercises/Activities, Patient/Caregiver Asked Appropriate Questions    Goals:   By discharge (6 weeks) Smita Tee  will achieve the following goals:      1. Patient to demonstrate AROM digits into loose composite fist for dressing and grooming  2. Patient to report pain 0/10 at rest for sleeping  3. Patient to lift and carry 5# with left hand with no difficulty for gardening tasks  4. Patient to demonstrate  strength left hand to be 70% of dominant side for household chores    5. Patient to demonstrate proper technique and competence with HEP   6. Patient to score disability rate less than 10% on Quick DASH

## 2024-03-11 ENCOUNTER — OFFICE VISIT (OUTPATIENT)
Dept: ORTHOPEDIC SURGERY | Facility: CLINIC | Age: 74
End: 2024-03-11
Payer: COMMERCIAL

## 2024-03-11 ENCOUNTER — CLINICAL SUPPORT (OUTPATIENT)
Dept: ORTHOPEDIC SURGERY | Facility: CLINIC | Age: 74
End: 2024-03-11
Payer: COMMERCIAL

## 2024-03-11 DIAGNOSIS — S62.613A DISPLACED FRACTURE OF PROXIMAL PHALANX OF LEFT MIDDLE FINGER, INITIAL ENCOUNTER FOR CLOSED FRACTURE: ICD-10-CM

## 2024-03-11 DIAGNOSIS — S62.617A DISPLACED FRACTURE OF PROXIMAL PHALANX OF LEFT LITTLE FINGER, INITIAL ENCOUNTER FOR CLOSED FRACTURE: Primary | ICD-10-CM

## 2024-03-11 PROCEDURE — 99024 POSTOP FOLLOW-UP VISIT: CPT | Performed by: ORTHOPAEDIC SURGERY

## 2024-03-11 PROCEDURE — 4010F ACE/ARB THERAPY RXD/TAKEN: CPT | Performed by: ORTHOPAEDIC SURGERY

## 2024-03-11 PROCEDURE — 20670 REMOVAL IMPLANT SUPERFICIAL: CPT | Performed by: PHYSICIAN ASSISTANT

## 2024-03-11 PROCEDURE — 1125F AMNT PAIN NOTED PAIN PRSNT: CPT | Performed by: ORTHOPAEDIC SURGERY

## 2024-03-11 PROCEDURE — 1159F MED LIST DOCD IN RCRD: CPT | Performed by: ORTHOPAEDIC SURGERY

## 2024-03-11 PROCEDURE — 73130 X-RAY EXAM OF HAND: CPT | Mod: LEFT SIDE | Performed by: ORTHOPAEDIC SURGERY

## 2024-03-11 PROCEDURE — 1036F TOBACCO NON-USER: CPT | Performed by: ORTHOPAEDIC SURGERY

## 2024-03-11 NOTE — PROGRESS NOTES
3/11/2024    Chief Complaint   Patient presents with   • Left Hand - Pain     Lt hand open treatment / pinning of long and small finger prox phal fx  Xrays today       History of Present Illness:  Patient Smita Tee , 73 y.o. female, presents today, 3/11/2024, for evaluation of left long and small finger proximal phalanx fractures, 4 and half weeks out from closed reduction percutaneous pinning.  Patient is here today with her daughter who acts as her .  She states that she is doing well in the interim since last visit.  She is wearing her splint has been compliant with nonweightbearing restrictions, and has been working with formal therapy on motion recovery.  She feels she is making slow progress of progress.       Review of Systems:   GENERAL: Negative  GI: Negative  MUSCULOSKELETAL: See HPI  SKIN: Negative  NEURO:  Negative     Physical Exam:  GENERAL:  Alert and oriented to person, place, and time.  No acute distress and breathing comfortably; pleasant and cooperative with the examination.  HEENT:  Head is normocephalic and atraumatic.  NECK:  Supple, no visible swelling.  CARDIOVASCULAR:  No palpable tachycardia.  LUNGS:  No audible wheezing or labored breathing.  ABDOMEN:  Nondistended.  Extremities: Evaluation of left upper extremity finds the patient to have a palpable radial artery at the wrist with brisk capillary refill to all digits. The patient has intact sensorium to axillary, radial, median and ulnar nerves. There are no open wounds. There are no signs of infection. There is no evidence of lymphedema or lymphatic streaking. The patient has supple compartments of the left arm, forearm and hand.  Pin sites are clean dry and intact.  She has improved range of motion.  Decreasing swelling is noted on physical exam.     Imaging/Test Results:  3 views of the left hand show evidence of proximal phalanx fractures to left long and small finger.  Continued acceptable alignment.  No  evidence of hardware failure migration.  Increase healing callus formation is noted.     Assessment:  Left long and small finger proximal phalanx fracture, 4 weeks postop from closed reduction percutaneous pinning.     Plan:  After thoughtful discussion recommendations made for pin removal.  This performed in office today and tolerated well by patient.  Continue strict nonweightbearing continue with custom splint, and continue with formal therapy on motion recovery.  Follow-up again in 2 weeks for repeat clinical exam x-rays 3 views left hand upon return.  Patient is traveling to [place] for an extended period tomorrow.  We discussed proper pin care and hand hygiene.  All questions answered at today's visit.    Felicita Mcelroy PA-C    FOREIGN BODY REMOVAL - EMBEDDED [PRO84]    Date/Time: 3/11/2024 12:27 PM    Performed by: Felicita Mcelroy PA-C  Authorized by: Felicita Mcelroy PA-C    Consent:     Consent obtained:  Verbal    Consent given by:  Patient    Risks, benefits, and alternatives were discussed: yes      Risks discussed:  Bleeding and pain    Alternatives discussed:  Delayed treatment  Universal protocol:     Procedure explained and questions answered to patient or proxy's satisfaction: yes      Relevant documents present and verified: yes      Imaging studies available: yes      Required blood products, implants, devices, and special equipment available: yes      Site/side marked: yes      Immediately prior to procedure, a time out was called: yes      Patient identity confirmed:  Verbally with patient  Location:     Tendon involvement:  None  Pre-procedure details:     Imaging:  X-ray    Neurovascular status: intact      Preparation: Patient was prepped and draped in usual sterile fashion    Anesthesia:     Anesthesia method:  None  Procedure type:     Procedure complexity:  Simple  Procedure details:     Localization method:  Visualized    Dissection of underlying tissues: no      Removal mechanism:   Forceps    Foreign bodies recovered:  5 or more    Description:  6 indwelling surgical K wires    Intact foreign body removal: yes    Post-procedure details:     Neurovascular status: intact      Confirmation:  No additional foreign bodies on visualization    Skin closure:  None    Dressing:  Sterile dressing    Procedure completion:  Tolerated well, no immediate complications

## 2024-03-25 ENCOUNTER — APPOINTMENT (OUTPATIENT)
Dept: ORTHOPEDIC SURGERY | Facility: CLINIC | Age: 74
End: 2024-03-25
Payer: COMMERCIAL

## (undated) DEVICE — SYRINGE, 60 CC, IRRIGATION, BULB, CONTRO-BULB, PAPER POUCH

## (undated) DEVICE — STOCKINETTE, IMPERVIOUS, LARGE, 9IN X 48IN

## (undated) DEVICE — MANIFOLD, 4 PORT NEPTUNE STANDARD

## (undated) DEVICE — GOWN, SURGICAL, ROYAL SILK, XL, STERILE

## (undated) DEVICE — STRAP, ARM BOARD, 32 X 1.5

## (undated) DEVICE — STRAP, VELCRO, BODY, 4 X 60IN, NS

## (undated) DEVICE — COVER, EQUIPMENT, C ARM, W/ STRAPS

## (undated) DEVICE — Device

## (undated) DEVICE — DRAPE, SHEET, 17 X 23 IN

## (undated) DEVICE — ELECTRODE, ELECTROSURGICAL, BLADE, INSULATED, ENT/IMA, STERILE

## (undated) DEVICE — GLOVE, SURGICAL, PROTEXIS PI , 7.5, PF, LF

## (undated) DEVICE — BANDAGE, COHESIVE, HAND TEAR, COFLEX, 2 X 5 YDS, LF

## (undated) DEVICE — BOWL, BASIN, 32 OZ, STERILE

## (undated) DEVICE — CUFF, TOURNIQUET, DUAL PORT, SNG BLADDER, 18 IN, PLC

## (undated) DEVICE — TOWEL PACK 10-PK

## (undated) DEVICE — BANDAGE, STRETCH, CONFORM, 2 IN X 4.1 YD, STERILE

## (undated) DEVICE — DRESSING, NON-ADHERENT, 3 X 3 IN, STERILE

## (undated) DEVICE — CAP, PROTECTIVE, BALL, JURGAN, 1.6 MM PIN, GREEN

## (undated) DEVICE — GLOVE, SURGICAL, PROTEXIS PI , 7.0, PF, LF

## (undated) DEVICE — SLING, ARM, W/LEATHERETTE PAD, MEDIUM

## (undated) DEVICE — BANDAGE, ELASTIC, MATRIX, SELF-CLOSURE, 3 IN X 5 YD, LF

## (undated) DEVICE — PADDING, CAST, SPECIALIST, 4 IN X 4 YD, STERILE

## (undated) DEVICE — TUBING, SUCTION, 6MM X 10, CLEAN N-COND